# Patient Record
Sex: FEMALE | Race: WHITE | Employment: OTHER | URBAN - METROPOLITAN AREA
[De-identification: names, ages, dates, MRNs, and addresses within clinical notes are randomized per-mention and may not be internally consistent; named-entity substitution may affect disease eponyms.]

---

## 2017-03-09 ENCOUNTER — OFFICE VISIT (OUTPATIENT)
Dept: OBGYN CLINIC | Age: 62
End: 2017-03-09

## 2017-03-09 VITALS
WEIGHT: 170 LBS | HEIGHT: 63 IN | SYSTOLIC BLOOD PRESSURE: 116 MMHG | DIASTOLIC BLOOD PRESSURE: 70 MMHG | BODY MASS INDEX: 30.12 KG/M2

## 2017-03-09 DIAGNOSIS — R92.8 ABNORMAL MAMMOGRAM OF LEFT BREAST: ICD-10-CM

## 2017-03-09 DIAGNOSIS — Z01.419 ENCOUNTER FOR GYNECOLOGICAL EXAMINATION (GENERAL) (ROUTINE) WITHOUT ABNORMAL FINDINGS: Primary | ICD-10-CM

## 2017-03-09 DIAGNOSIS — Z11.51 SCREENING FOR HPV (HUMAN PAPILLOMAVIRUS): ICD-10-CM

## 2017-03-09 NOTE — PROGRESS NOTES
[facility]  [email or web address]  [phone]    Earlene Espinoza MD, [address] [facility]       Annual Gynecologic Exam:   WWE 60+  Chief Complaint   Patient presents with    Well Woman         Deepti Ngo is a 64 y.o. No obstetric history on file. WHITE OR   female who presents for an annual exam.    She does not report additional concerns today. Sexual history and Contraception:  History   Sexual Activity    Sexual activity: Yes    Partners: Male     She does not reports new sexual partner(s) in the last year. Preventive Medicine History:  Her last annual GYN exam was about two years ago. Her most recent Pap smear result: normal was obtained in October 2013. Her most recent HR HPV screen was Negative obtained 4 year(s) ago. Occ leaks urine when jumps on trampoline  She does not have a history of GALILEA 2, 3 or cervical cancer. Breast health:  Last mammogram: approximate date 10/7/15 and was normal.   A mammogram was scheduled for today. Breast cancer family updated: see FH. Bone health: Samantha Etienne She has not had a bone density scan in the past.   Last bone density test results: patient has never had a bone density scan. She does not have a history of osteopenia/osteoporosis. Osteoporosis family history updated: see FH.      Past Medical History:   Diagnosis Date    GERD (gastroesophageal reflux disease)     Headache(784.0)     migraines    History of mammogram 10/7/15    Negative, BIRADS 2    Hx of biopsy 11/18/13    vulvar bx: benign    Hypercholesterolemia     Hypertension     Other ill-defined conditions(799.89)     hx vagal responces     Tubulovillous adenoma 2007    Unspecified sleep apnea     NO CPAP     Past Surgical History:   Procedure Laterality Date    ENDOSCOPY, COLON, DIAGNOSTIC  8/2010    HX BILATERAL SALPINGO-OOPHORECTOMY  1/8/14    benign    HX CATARACT REMOVAL      bilateral    HX LAP CHOLECYSTECTOMY      CAT Pace.     HX OTHER SURGICAL      Cataract removal - 2001    HX OTHER SURGICAL  2011    EPIDURAL STEROID INJ LOW BACK    HX SALPINGO-OOPHORECTOMY      prophylactic due to family hx ovarian ca     Family History   Problem Relation Age of Onset    Ovarian Cancer Mother     Cancer Mother      ovarian    Dementia Father     Other Father      Insomnia    Diabetes Maternal Grandmother     Cancer Paternal Grandmother      colon    Parkinson's Disease Paternal Grandfather     Heart Disease Neg Hx     Hypertension Neg Hx      Social History     Social History    Marital status:      Spouse name: N/A    Number of children: N/A    Years of education: N/A     Occupational History    Not on file. Social History Main Topics    Smoking status: Never Smoker    Smokeless tobacco: Never Used    Alcohol use 0.0 oz/week      Comment: 2 DRINKS PER MONTH    Drug use: No    Sexual activity: Yes     Partners: Male     Other Topics Concern    Not on file     Social History Narrative       Allergies   Allergen Reactions    Apple Angioedema     Raw apples cause lip/eye to swelling  Peaches causes lip/eye swelling        Current Outpatient Prescriptions   Medication Sig    irbesartan (AVAPRO) 150 mg tablet TAKE ONE TABLET BY MOUTH DAILY ( GENERIC FOR AVAPRO)    FLUoxetine (PROZAC) 10 mg tablet TAKE ONE TABLET BY MOUTH EVERY DAY    diclofenac EC (VOLTAREN) 75 mg EC tablet Take  by mouth as needed.  raNITIdine (ZANTAC) 150 mg tablet Take 1 Tab by mouth two (2) times a day.  DIPHENHYDRAMINE HCL (BENADRYL PO) Take  by mouth as needed. No current facility-administered medications for this visit. Patient Active Problem List   Diagnosis Code    Chest pain, unspecified R07.9    Esophageal reflux K21.9    Overweight (BMI 25.0-29. 9) E66.3    Other and unspecified hyperlipidemia E78.5    HTN (hypertension) I10    JOLLY (obstructive sleep apnea) G47.33    Anxiety F41.9    FH: ovarian cancer Z80.41    UMA (stress urinary incontinence, female) N39.3    Gastroesophageal reflux disease without esophagitis K21.9       Review of Systems - History obtained from the patient  Constitutional: negative for weight loss, fever, night sweats  HEENT: negative for hearing loss, earache, congestion, snoring, sorethroat  CV: negative for chest pain, palpitations, edema  Resp: negative for cough, shortness of breath, wheezing  GI: negative for change in bowel habits, abdominal pain, black or bloody stools  : negative for frequency, dysuria, hematuria, vaginal discharge  MSK: negative for back pain, joint pain, muscle pain  Breast: negative for breast lumps, nipple discharge, galactorrhea  Skin :negative for itching, rash, hives  Neuro: negative for dizziness, headache, confusion, weakness  Psych: negative for anxiety, depression, change in mood  Heme/lymph: negative for bleeding, bruising, pallor    Physical Exam  Visit Vitals    /70    Ht 5' 3\" (1.6 m)    Wt 170 lb (77.1 kg)    BMI 30.11 kg/m2       Constitutional  · Appearance: well-nourished, well developed, alert, in no acute distress    HENT  · Head and Face: appears normal    Neck  · Inspection/Palpation: normal appearance, no masses or tenderness  · Lymph Nodes: no lymphadenopathy present  · Thyroid: gland size normal, nontender, no nodules or masses present on palpation    Chest  · Respiratory Effort: breathing labored  · Auscultation: normal breath sounds    Cardiovascular  · Heart:  · Auscultation: regular rate and rhythm without murmur    Breasts  · Inspection of Breasts: breasts symmetrical, no skin changes, no discharge present, nipple appearance normal, no skin retraction present  · Palpation of Breasts and Axillae: no masses present on palpation, no breast tenderness  · Axillary Lymph Nodes: no lymphadenopathy present    Gastrointestinal  · Abdominal Examination: abdomen non-tender to palpation, normal bowel sounds, no masses present  · Liver and spleen: no hepatomegaly present, spleen not palpable  · Hernias: no hernias identified    Genitourinary  · External Genitalia: normal appearance for age, no discharge present, no tenderness present, no inflammatory lesions present, no masses present, no atrophy present  · Vagina: normal vaginal vault with mild anterior laxity, no discharge present, no inflammatory lesions present, no masses present  · Bladder: non-tender to palpation  · Urethra: appears normal  · Cervix: normal   · Uterus: normal size, shape and consistency  · Adnexa: no adnexal tenderness present, no adnexal masses present  · Perineum: perineum within normal limits, no evidence of trauma, no rashes or skin lesions present  · Anus: anus within normal limits, no hemorrhoids present  · Inguinal Lymph Nodes: no lymphadenopathy present    Skin  · General Inspection: no rash, no lesions identified    Neurologic/Psychiatric  · Mental Status:  · Orientation: grossly oriented to person, place and time  · Mood and Affect: mood normal, affect appropriate    Assessment:  64 y.o. No obstetric history on file. for well woman exam  Encounter Diagnoses   Name Primary?     Abnormal mammogram of left breast     Encounter for gynecological examination (general) (routine) without abnormal findings Yes    Screening for HPV (human papillomavirus)        Plan:  The patient was counseled about diet, exercise, healthy lifestyle  We discussed current self breast exam and mammogram recommendations  We discussed current pap smear and HR HPV testing guidelines  We recommend follow up in one year for routine annual gynecologic exam or sooner if needed  We recommend follow up with a primary care physician for any chronic medical problems or non-gynecologic concerns    We discussed calcium/vitamin D/weight bearing exercise and osteoporosis prevention  Handouts were given to the patient   pelvic floor exercises h/o given, contact MD if desires PT referral, avoid strain    Breast imaging referral. Reviewed prior mmg/us: simple cyst.  Folllow up:  [x] return for annual well woman exam in one year or sooner if she is having problems  [] follow up and ultrasound  [] mammogram  [] 6 months  [] 6 weeks   []     Orders Placed This Encounter    [facility] MAMMO LT DX INCL CAD    US BREAST LT COMPLETE 4 QUAD    PAP IG, HPV AND RFX HPV 01/87,99([id number])       Results for orders placed or performed in visit on 03/09/17   [facility] MAMMO BI SCREENING INCL CAD    Narrative    STUDY: Bilateral digital screening mammogram    INDICATION:  Screening. COMPARISON:  2013, 2015    BREAST COMPOSITION:  The breasts are heterogeneously dense, which may obscure  small masses. FINDINGS: Bilateral digital screening mammography was performed and is  interpreted in conjunction with a computer assisted detection (CAD) system. No  suspicious  calcifications are identified. There is interval development of a  macrolobulated 1.1 cm structure located 4.5 cm from the nipple at the 9:00  position of the left breast. The nipple appears to be newly inverted. Impression    IMPRESSION:  BI-RADS 0: Incomplete. Needs additional imaging evaluation. RECOMMENDATIONS:  Spot compression views and ultrasound are recommended for evaluation of the left  breast mass and possible nipple inversion    The patient will be notified of these results.

## 2017-03-09 NOTE — MR AVS SNAPSHOT
Visit Information Date & Time Provider Department Dept. Phone Encounter #  
 3/9/2017  9:40 AM Steven Rodarte MD [facility] [phone] [id number] Upcoming Health Maintenance Date Due Hepatitis C Screening 1955 COLONOSCOPY 12/1/1973 ZOSTER VACCINE AGE 60> 12/1/2015 BREAST CANCER SCRN MAMMOGRAM 10/7/2016 PAP AKA CERVICAL CYTOLOGY 1/1/2017 Allergies as of 3/9/2017  Review Complete On: 3/9/2017 By: Jeff Harry LPN Severity Noted Reaction Type Reactions Apple High 01/08/2014   Side Effect Angioedema Raw apples cause lip/eye to swelling Peaches causes lip/eye swelling Current Immunizations  Reviewed on 11/18/2016 Name Date Influenza Vaccine 9/18/2016, 10/1/2014, 10/1/2013 TB Skin Test (PPD) 5/5/2014 TDAP Vaccine 9/6/2011 Not reviewed this visit Vitals BP Height(growth percentile) Weight(growth percentile) BMI OB Status Smoking Status 116/70 5' 3\" (1.6 m) 170 lb (77.1 kg) 30.11 kg/m2 Postmenopausal Never Smoker BMI and BSA Data Body Mass Index Body Surface Area  
 30.11 kg/m 2 1.85 m 2 Preferred Pharmacy Pharmacy Name Phone [address] [facility], [address] [facility] [place] [phone] Your Updated Medication List  
  
   
This list is accurate as of: 3/9/17  9:53 AM.  Always use your most recent med list.  
  
  
  
  
 BENADRYL PO Take  by mouth as needed. diclofenac EC 75 mg EC tablet Commonly known as:  VOLTAREN Take  by mouth as needed. FLUoxetine 10 mg tablet Commonly known as:  PROzac TAKE ONE TABLET BY MOUTH EVERY DAY  
  
 irbesartan 150 mg tablet Commonly known as:  AVAPRO TAKE ONE TABLET BY MOUTH DAILY ( [address] [facility]) raNITIdine 150 mg tablet Commonly known as:  ZANTAC Take 1 Tab by mouth two (2) times a day. Patient Instructions Well Visit, Women 48 to 72: Care Instructions Your Care Instructions Physical exams can help you stay healthy. Your doctor has checked your overall health and may have suggested ways to take good care of yourself. He or she also may have recommended tests. At home, you can help prevent illness with healthy eating, regular exercise, and other steps. Follow-up care is a key part of your treatment and safety. Be sure to make and go to all appointments, and call your doctor if you are having problems. It's also a good idea to know your test results and keep a list of the medicines you take. How can you care for yourself at home? · Reach and stay at a healthy weight. This will lower your risk for many problems, such as obesity, diabetes, heart disease, and high blood pressure. · Get at least 30 minutes of exercise on most days of the week. Walking is a good choice. You also may want to do other activities, such as running, swimming, cycling, or playing tennis or team sports. · Do not smoke. Smoking can make health problems worse. If you need help quitting, talk to your doctor about stop-smoking programs and medicines. These can increase your chances of quitting for good. · Protect your skin from too much sun. When you're outdoors from 10 a.m. to 4 p.m., stay in the shade or cover up with clothing and a hat with a wide brim. Wear sunglasses that block UV rays. Even when it's cloudy, put broad-spectrum sunscreen (SPF 30 or higher) on any exposed skin. · See a dentist one or two times a year for checkups and to have your teeth cleaned. · Wear a seat belt in the car. · Limit alcohol to 1 drink a day. Too much alcohol can cause health problems. Follow your doctor's advice about when to have certain tests. These tests can spot problems early. · Cholesterol. Your doctor will tell you how often to have this done based on your age, family history, or other things that can increase your risk for heart attack and stroke. · Blood pressure. Have your blood pressure checked during a routine doctor visit. Your doctor will tell you how often to check your blood pressure based on your age, your blood pressure results, and other factors. · Mammogram. Ask your doctor how often you should have a mammogram, which is an X-ray of your breasts. A mammogram can spot breast cancer before it can be felt and when it is easiest to treat. · Pap test and pelvic exam. Ask your doctor how often you should have a Pap test. You may not need to have a Pap test as often as you used to. · Vision. Have your eyes checked every year or two or as often as your doctor suggests. Some experts recommend that you have yearly exams for glaucoma and other age-related eye problems starting at age 48. · Hearing. Tell your doctor if you notice any change in your hearing. You can have tests to find out how well you hear. · Diabetes. Ask your doctor whether you should have tests for diabetes. · Colon cancer. You should begin tests for colon cancer at age 48. You may have one of several tests. Your doctor will tell you how often to have tests based on your age and risk. Risks include whether you already had a precancerous polyp removed from your colon or whether your parents, sisters and brothers, or children have had colon cancer. · Thyroid disease. Talk to your doctor about whether to have your thyroid checked as part of a regular physical exam. Women have an increased chance of a thyroid problem. · Osteoporosis. You should begin tests for bone density at age 72. If you are younger than 72, ask your doctor whether you have factors that may increase your risk for this disease. You may want to have this test before age 72. · Heart attack and stroke risk. At least every 4 to 6 years, you should have your risk for heart attack and stroke assessed.  Your doctor uses factors such as your age, blood pressure, cholesterol, and whether you smoke or have diabetes to show what your risk for a heart attack or stroke is over the next 10 years. When should you call for help? Watch closely for changes in your health, and be sure to contact your doctor if you have any problems or symptoms that concern you. Where can you learn more? Go to http://victor hugo-maureen.info/. Enter G749 in the search box to learn more about \"Well Visit, Women 50 to 72: Care Instructions. \" Current as of: July 19, 2016 Content Version: 11.1 © 8177-8533 Outlisten. Care instructions adapted under license by [employer] (which disclaims liability or warranty for this information). If you have questions about a medical condition or this instruction, always ask your healthcare professional. [address] any warranty or liability for your use of this information. Introducing [facility]! Dear Flor Castellanos: Thank you for requesting a [employer] account. Our records indicate that you already have an active [employer] account. You can access your account anytime at [email or web address] Did you know that you can access your hospital and ER discharge instructions at any time in [employer]? You can also review all of your test results from your hospital stay or ER visit. Additional Information If you have questions, please visit the Frequently Asked Questions section of the [employer] website at [email or web address] Remember, [employer] is NOT to be used for urgent needs. For medical emergencies, dial 911. Now available from your iPhone and Android! Please provide this summary of care documentation to your next provider. Your primary care clinician is listed as Elle Yadav. If you have any questions after today's visit, please call [phone].

## 2017-03-09 NOTE — PATIENT INSTRUCTIONS
Well Visit, Women 48 to 72: Care Instructions  Your Care Instructions  Physical exams can help you stay healthy. Your doctor has checked your overall health and may have suggested ways to take good care of yourself. He or she also may have recommended tests. At home, you can help prevent illness with healthy eating, regular exercise, and other steps. Follow-up care is a key part of your treatment and safety. Be sure to make and go to all appointments, and call your doctor if you are having problems. It's also a good idea to know your test results and keep a list of the medicines you take. How can you care for yourself at home? · Reach and stay at a healthy weight. This will lower your risk for many problems, such as obesity, diabetes, heart disease, and high blood pressure. · Get at least 30 minutes of exercise on most days of the week. Walking is a good choice. You also may want to do other activities, such as running, swimming, cycling, or playing tennis or team sports. · Do not smoke. Smoking can make health problems worse. If you need help quitting, talk to your doctor about stop-smoking programs and medicines. These can increase your chances of quitting for good. · Protect your skin from too much sun. When you're outdoors from 10 a.m. to 4 p.m., stay in the shade or cover up with clothing and a hat with a wide brim. Wear sunglasses that block UV rays. Even when it's cloudy, put broad-spectrum sunscreen (SPF 30 or higher) on any exposed skin. · See a dentist one or two times a year for checkups and to have your teeth cleaned. · Wear a seat belt in the car. · Limit alcohol to 1 drink a day. Too much alcohol can cause health problems. Follow your doctor's advice about when to have certain tests. These tests can spot problems early. · Cholesterol.  Your doctor will tell you how often to have this done based on your age, family history, or other things that can increase your risk for heart attack and stroke. · Blood pressure. Have your blood pressure checked during a routine doctor visit. Your doctor will tell you how often to check your blood pressure based on your age, your blood pressure results, and other factors. · Mammogram. Ask your doctor how often you should have a mammogram, which is an X-ray of your breasts. A mammogram can spot breast cancer before it can be felt and when it is easiest to treat. · Pap test and pelvic exam. Ask your doctor how often you should have a Pap test. You may not need to have a Pap test as often as you used to. · Vision. Have your eyes checked every year or two or as often as your doctor suggests. Some experts recommend that you have yearly exams for glaucoma and other age-related eye problems starting at age 48. · Hearing. Tell your doctor if you notice any change in your hearing. You can have tests to find out how well you hear. · Diabetes. Ask your doctor whether you should have tests for diabetes. · Colon cancer. You should begin tests for colon cancer at age 48. You may have one of several tests. Your doctor will tell you how often to have tests based on your age and risk. Risks include whether you already had a precancerous polyp removed from your colon or whether your parents, sisters and brothers, or children have had colon cancer. · Thyroid disease. Talk to your doctor about whether to have your thyroid checked as part of a regular physical exam. Women have an increased chance of a thyroid problem. · Osteoporosis. You should begin tests for bone density at age 72. If you are younger than 72, ask your doctor whether you have factors that may increase your risk for this disease. You may want to have this test before age 72. · Heart attack and stroke risk. At least every 4 to 6 years, you should have your risk for heart attack and stroke assessed.  Your doctor uses factors such as your age, blood pressure, cholesterol, and whether you smoke or have diabetes to show what your risk for a heart attack or stroke is over the next 10 years. When should you call for help? Watch closely for changes in your health, and be sure to contact your doctor if you have any problems or symptoms that concern you. Where can you learn more? Go to http://victor hugo-maureen.info/. Enter Z408 in the search box to learn more about \"Well Visit, Women 50 to 72: Care Instructions. \"  Current as of: July 19, 2016  Content Version: 11.1  © 7368-0132 [employer]. Care instructions adapted under license by [employer] (which disclaims liability or warranty for this information). If you have questions about a medical condition or this instruction, always ask your healthcare professional. [address] any warranty or liability for your use of this information.

## 2017-03-12 LAB
CYTOLOGIST CVX/VAG CYTO: NORMAL
CYTOLOGY CVX/VAG DOC THIN PREP: NORMAL
CYTOLOGY HISTORY:: NORMAL
DX ICD CODE: NORMAL
HPV I/H RISK 1 DNA CVX QL PROBE+SIG AMP: NEGATIVE
Lab: NORMAL
OTHER STN SPEC: NORMAL
PATH REPORT.FINAL DX SPEC: NORMAL
STAT OF ADQ CVX/VAG CYTO-IMP: NORMAL

## 2017-03-15 ENCOUNTER — TELEPHONE (OUTPATIENT)
Dept: OBGYN CLINIC | Age: 62
End: 2017-03-15

## 2017-03-15 NOTE — TELEPHONE ENCOUNTER
Patient called requesting the number for central scheduling to have her MGM done for additional testing. This nurse provided the patient with the correct number. She verbalized understanding.

## 2017-03-22 ENCOUNTER — HOSPITAL ENCOUNTER (OUTPATIENT)
Dept: MAMMOGRAPHY | Age: 62
Discharge: HOME | End: 2017-03-22
Attending: OBSTETRICS & GYNECOLOGY
Payer: COMMERCIAL

## 2017-03-22 DIAGNOSIS — R92.8 ABNORMAL MAMMOGRAM OF LEFT BREAST: ICD-10-CM

## 2017-03-22 PROCEDURE — 77065 DX MAMMO INCL CAD UNI: CPT

## 2017-03-22 PROCEDURE — 76642 ULTRASOUND BREAST LIMITED: CPT

## 2017-07-19 DIAGNOSIS — F41.8 SITUATIONAL ANXIETY: ICD-10-CM

## 2017-07-19 DIAGNOSIS — I15.9 SECONDARY HYPERTENSION: Primary | ICD-10-CM

## 2017-07-19 RX ORDER — FLUOXETINE 10 MG/1
TABLET ORAL
Qty: 30 TAB | Refills: 0 | Status: SHIPPED | OUTPATIENT
Start: 2017-07-19 | End: 2017-08-03 | Stop reason: SDUPTHER

## 2017-07-19 RX ORDER — IRBESARTAN 150 MG/1
150 TABLET ORAL DAILY
Qty: 30 TAB | Refills: 0 | Status: SHIPPED | OUTPATIENT
Start: 2017-07-19 | End: 2017-08-03 | Stop reason: SDUPTHER

## 2017-07-19 NOTE — TELEPHONE ENCOUNTER
[facility] Fax Request    Rx Irbesartan 150 mg Qty (30) no refills  RX Fluoxetine 10mg Qty (30) no refills    Last Refill - 12/28/16  Last Visit - 11/18/16  Last Labs - 11/18/16

## 2017-08-03 ENCOUNTER — OFFICE VISIT (OUTPATIENT)
Dept: INTERNAL MEDICINE CLINIC | Age: 62
End: 2017-08-03

## 2017-08-03 VITALS
SYSTOLIC BLOOD PRESSURE: 120 MMHG | TEMPERATURE: 98.8 F | BODY MASS INDEX: 29.5 KG/M2 | DIASTOLIC BLOOD PRESSURE: 76 MMHG | OXYGEN SATURATION: 97 % | RESPIRATION RATE: 18 BRPM | HEART RATE: 70 BPM | WEIGHT: 166.5 LBS | HEIGHT: 63 IN

## 2017-08-03 DIAGNOSIS — I10 ESSENTIAL HYPERTENSION: Primary | ICD-10-CM

## 2017-08-03 DIAGNOSIS — D36.9 ADENOMATOUS POLYP: ICD-10-CM

## 2017-08-03 DIAGNOSIS — F41.9 ANXIETY: ICD-10-CM

## 2017-08-03 RX ORDER — IRBESARTAN 150 MG/1
150 TABLET ORAL DAILY
Qty: 30 TAB | Refills: 5 | Status: SHIPPED | OUTPATIENT
Start: 2017-08-03 | End: 2018-03-15 | Stop reason: SDUPTHER

## 2017-08-03 RX ORDER — FLUOXETINE 10 MG/1
10 TABLET ORAL DAILY
Qty: 30 TAB | Refills: 5 | Status: SHIPPED | OUTPATIENT
Start: 2017-08-03 | End: 2017-11-01 | Stop reason: SDUPTHER

## 2017-08-03 NOTE — PROGRESS NOTES
HISTORY OF PRESENT ILLNESS  Parviz Raya is a 64 y.o. female. HPI  Hypertension:  Parviz Raya is a 64 y.o. female with hypertension. without Chronic kidney disease stage    Medication change since last visit: No  The patient reports:  taking medications as instructed, no medication side effects noted, home BP monitoring in range of 818-989'K systolic over 57-63'L diastolic, no chest pain on exertion, no dyspnea on exertion, no swelling of ankles, no orthostatic dizziness or lightheadedness, no palpitations. Lifestyle modification/social history: generally follows a low fat low cholesterol diet, generally follows a low sodium diet, sedentary, nonsmoker    Lab Results   Component Value Date/Time    Sodium 142 03/09/2017 10:33 AM    Potassium 4.5 03/09/2017 10:33 AM    Chloride 103 03/09/2017 10:33 AM    CO2 25 03/09/2017 10:33 AM    Anion gap 8 12/31/2013 10:10 AM    Glucose 93 03/09/2017 10:33 AM    BUN 11 03/09/2017 10:33 AM    Creatinine 0.68 03/09/2017 10:33 AM    BUN/Creatinine ratio 16 03/09/2017 10:33 AM    GFR est  03/09/2017 10:33 AM    GFR est non-AA 95 03/09/2017 10:33 AM    Calcium 9.3 03/09/2017 10:33 AM         Anxiety. Anxiety :  Patient is seen for anxiety disorder. Current treatment includes Prozac and no other therapies. Reported side effects from the medication: none. Ongoing symptoms include: some increased tension, anxiety related to family stressors. Daughter nearing delivery, high risk pregnancy and lost child soon after delivery last year. Pt is worried about her. Work some stressful but retiring in several months. she does feel treatment is effective. Patient denies: palpitations, chest pain, shortness of breath, dizziness. Depression is not associated with her anxiety. She is not interested in escalating prozac however. ROS    Physical Exam   Constitutional: She appears well-developed and well-nourished. No distress.    /60 (BP 1 Location: Left arm, BP Patient Position: Sitting)  Pulse 70  Temp 98.8 °F (37.1 °C) (Oral)   Resp 18  Ht 5' 3\" (1.6 m)  Wt 166 lb 8 oz (75.5 kg)  LMP  (Approximate)  SpO2 97%  BMI 29.49 kg/m2Body mass index is 29.49 kg/(m^2). HENT:   Mouth/Throat: Oropharynx is clear and moist.   Neck: No JVD present. Carotid bruit is not present. Cardiovascular: Normal rate, regular rhythm, normal heart sounds and intact distal pulses. Pulmonary/Chest: Effort normal and breath sounds normal.   Musculoskeletal: She exhibits no edema. Neurological: She is alert. Skin: Skin is warm and dry. She is not diaphoretic. Psychiatric: She has a normal mood and affect. Her speech is normal and behavior is normal. Judgment and thought content normal. Cognition and memory are normal.   Nursing note and vitals reviewed. Repeat vitals were done by me and were:  Visit Vitals    /76    Pulse 70    Temp 98.8 °F (37.1 °C) (Oral)    Resp 18    Ht 5' 3\" (1.6 m)    Wt 166 lb 8 oz (75.5 kg)    LMP  (Approximate)    SpO2 97%    BMI 29.49 kg/m2         ASSESSMENT and PLAN  Diagnoses and all orders for this visit:    1. Essential hypertension - Well controlled and stable. her medications were reviewed and refilled where necessary as noted below. Labs ordered as noted. -     irbesartan (AVAPRO) 150 mg tablet; Take 1 Tab by mouth daily. 2. Anxiety - mild exacerbation with family/ work stressors. She delines offer to increase prozac for now. -     FLUoxetine (PROZAC) 10 mg tablet; Take 1 Tab by mouth daily. 3. Adenomatous polyp - due for 5 year follow up.   ]she was advised to have follow up colonoscopy in 2017      -     REFERRAL FOR COLONOSCOPY      Follow-up Disposition:  Return in about 6 months (around 2/3/2018).

## 2017-08-03 NOTE — MR AVS SNAPSHOT
Visit Information Date & Time Provider Department Dept. Phone Encounter #  
 8/3/2017 10:00 AM Mendel Councilman, MD [facility] [address] [facility] [id number] Follow-up Instructions Return in about 6 months (around 2/3/2018). Your Appointments 3/12/2018  9:40 AM  
MAMMOGRAPHY with MAMMOGRAM, YOLANDA Coe ([address]) Appt Note: mammo and ae   TP  
 [address] [place] [address]  
[phone]  
  
   
 [address]  
  
    
 3/12/2018 10:00 AM  
ESTABLISHED PATIENT with Desire Jones MD  
[place] ([address]) Appt Note: mammo and ae   TP  
 [address] [facility] [address]  
[address] Upcoming Health Maintenance Date Due Hepatitis C Screening 1955 COLONOSCOPY 12/1/1973 ZOSTER VACCINE AGE 60> 10/1/2015 INFLUENZA AGE 9 TO ADULT 8/1/2017 BREAST CANCER SCRN MAMMOGRAM 3/22/2018 PAP AKA CERVICAL CYTOLOGY 3/9/2020 DTaP/Tdap/Td series (2 - Td) 9/6/2021 Allergies as of 8/3/2017  Review Complete On: 8/3/2017 By: Narayan Izaguirre LPN Severity Noted Reaction Type Reactions Apple High 01/08/2014   Side Effect Angioedema Raw apples cause lip/eye to swelling Peaches causes lip/eye swelling Current Immunizations  Reviewed on 11/18/2016 Name Date Influenza Vaccine 9/18/2016, 10/1/2014, 10/1/2013 TB Skin Test (PPD) 5/5/2014 TDAP Vaccine 9/6/2011 Not reviewed this visit You Were Diagnosed With   
  
 Codes Comments Essential hypertension    -  Primary ICD-10-CM: I10 
ICD-9-CM: 401.9 Anxiety     ICD-10-CM: F41.9 ICD-9-CM: 300.00 Situational anxiety     ICD-10-CM: F41.8 ICD-9-CM: 300.09 Secondary hypertension     ICD-10-CM: I15.9 ICD-9-CM: 405.99   
 Adenomatous polyp     ICD-10-CM: D36.9 ICD-9-CM: 229.9 Vitals BP Pulse Temp Resp Height(growth percentile) Weight(growth percentile) 101/60 (BP 1 Location: Left arm, BP Patient Position: Sitting) 70 98.8 °F (37.1 °C) (Oral) 18 5' 3\" (1.6 m) 166 lb 8 oz (75.5 kg) LMP SpO2 BMI OB Status Smoking Status (Approximate) 97% 29.49 kg/m2 Postmenopausal Never Smoker BMI and BSA Data Body Mass Index Body Surface Area  
 29.49 kg/m 2 1.83 m 2 Preferred Pharmacy [facility] Name Phone [id number] [address] [facility] [phone] Your Updated Medication List  
  
   
This list is accurate as of: 8/3/17 10:24 AM.  Always use your most recent med list.  
  
  
  
  
 BENADRYL PO Take  by mouth as needed. diclofenac EC 75 mg EC tablet Commonly known as:  VOLTAREN Take  by mouth as needed. FLUoxetine 10 mg tablet Commonly known as:  PROzac Take 1 Tab by mouth daily. irbesartan 150 mg tablet Commonly known as:  AVAPRO Take 1 Tab by mouth daily. Prescriptions Sent to Pharmacy Refills FLUoxetine (PROZAC) 10 mg tablet 5 Sig: Take 1 Tab by mouth daily. Class: Normal  
 Pharmacy: [address], [address] [place] [address] Ph #: [phone] Route: Oral  
 irbesartan (AVAPRO) 150 mg tablet 5 Sig: Take 1 Tab by mouth daily. Class: Normal  
 Pharmacy: [address], [address] [place] [address] Ph #: [phone] Route: Oral  
  
We Performed the Following REFERRAL FOR COLONOSCOPY [[id number] Custom] Follow-up Instructions Return in about 6 months (around 2/3/2018). Referral Information Referral ID Referred By Referred To  
  
 [id number] BEBE, [address]  [place], [address] [facility] Visits Status Start Date End Date 1 New Request 8/3/17 8/3/18 If your referral has a status of pending review or denied, additional information will be sent to support the outcome of this decision. Introducing hospitals & HEALTH SERVICES! Dear Sepideh Lee: Thank you for requesting a [employer] account. Our records indicate that you already have an active [employer] account. You can access your account anytime at [email or web address] Did you know that you can access your hospital and ER discharge instructions at any time in [employer]? You can also review all of your test results from your hospital stay or ER visit. Additional Information If you have questions, please visit the Frequently Asked Questions section of the [employer] website at [email or web address] Remember, [employer] is NOT to be used for urgent needs. For medical emergencies, dial 911. Now available from your iPhone and Android! Please provide this summary of care documentation to your next provider. Your primary care clinician is listed as [facility]. If you have any questions after today's visit, please call [phone].

## 2017-11-01 DIAGNOSIS — F41.9 ANXIETY: ICD-10-CM

## 2017-11-01 RX ORDER — FLUOXETINE 10 MG/1
10 TABLET ORAL DAILY
Qty: 90 TAB | Refills: 2 | Status: SHIPPED | OUTPATIENT
Start: 2017-11-01 | End: 2018-10-11 | Stop reason: SDUPTHER

## 2017-11-03 DIAGNOSIS — F41.9 ANXIETY: ICD-10-CM

## 2017-11-03 RX ORDER — FLUOXETINE 10 MG/1
10 TABLET ORAL DAILY
Qty: 90 TAB | Refills: 2 | Status: CANCELLED | OUTPATIENT
Start: 2017-11-03

## 2017-11-03 NOTE — TELEPHONE ENCOUNTER
Per the Pharmacist wants to know if patient can have a  90 days supply on her  FLUoxetine (PROZAC) 10 mg tablet  Please call [facility] at [phone]

## 2018-01-25 ENCOUNTER — OFFICE VISIT (OUTPATIENT)
Dept: INTERNAL MEDICINE CLINIC | Age: 63
End: 2018-01-25

## 2018-01-25 VITALS
BODY MASS INDEX: 39.76 KG/M2 | SYSTOLIC BLOOD PRESSURE: 113 MMHG | OXYGEN SATURATION: 100 % | RESPIRATION RATE: 18 BRPM | HEART RATE: 95 BPM | HEIGHT: 63 IN | TEMPERATURE: 100.1 F | WEIGHT: 224.38 LBS | DIASTOLIC BLOOD PRESSURE: 77 MMHG

## 2018-01-25 DIAGNOSIS — R50.9 ELEVATED TEMPERATURE: ICD-10-CM

## 2018-01-25 DIAGNOSIS — J11.1 INFLUENZA: Primary | ICD-10-CM

## 2018-01-25 LAB
QUICKVUE INFLUENZA TEST: POSITIVE
VALID INTERNAL CONTROL?: YES

## 2018-01-25 RX ORDER — IBUPROFEN 400 MG/1
TABLET ORAL
COMMUNITY
End: 2022-02-09

## 2018-01-25 RX ORDER — HYDROCODONE POLISTIREX AND CHLORPHENIRAMINE POLISTIREX 10; 8 MG/5ML; MG/5ML
1 SUSPENSION, EXTENDED RELEASE ORAL
Qty: 120 ML | Refills: 0 | Status: SHIPPED | OUTPATIENT
Start: 2018-01-25 | End: 2018-03-12

## 2018-01-25 NOTE — MR AVS SNAPSHOT
[address] [facility] [address] 
 
 
 [address] Fidelina Heady [address] [facility] 
[phone] Patient: Sunny Riddle MRN: [id number] JQC:[id number] Visit Information Date & Time Provider Department Dept. Phone Encounter #  
 1/25/2018  9:15 AM Lisbeth Lewis MD [facility] [address] [id number] Follow-up Instructions Return if symptoms worsen or fail to improve. Your Appointments 3/12/2018  9:40 AM  
MAMMOGRAPHY with MAMMOGRAM, YOLANDA Coe ([address]) Appt Note: mammo and ae   TP  
 [address] [place] [address]  
[phone]  
  
   
 [address] [place] [address]  
  
    
 3/12/2018 10:00 AM  
ESTABLISHED PATIENT with Chas Schwab MD  
[place] ([address] [facility]) Appt Note: mammo and ae   TP  
 [address] [facility] [phone]  
[facility] [address] Upcoming Health Maintenance Date Due Hepatitis C Screening 1955 COLONOSCOPY 12/1/1973 Influenza Age 5 to Adult 8/1/2017 BREAST CANCER SCRN MAMMOGRAM 3/22/2018 PAP AKA CERVICAL CYTOLOGY 3/9/2020 DTaP/Tdap/Td series (2 - Td) 9/6/2021 Allergies as of 1/25/2018  Review Complete On: 8/3/2017 By: Sheilda Favre, LPN Severity Noted Reaction Type Reactions Apple High 01/08/2014   Side Effect Angioedema Raw apples cause lip/eye to swelling Peaches causes lip/eye swelling Current Immunizations  Reviewed on 11/18/2016 Name Date Influenza Vaccine 9/18/2016, 10/1/2014, 10/1/2013 TB Skin Test (PPD) 5/5/2014 TDAP Vaccine 9/6/2011 Zoster Vaccine, Live 12/3/2017 12:00 AM  
  
 Not reviewed this visit You Were Diagnosed With   
  
 Codes Comments Influenza    -  Primary ICD-10-CM: J11.1 ICD-9-CM: 840.5 Elevated temperature     ICD-10-CM: R50.9 ICD-9-CM: 780.60 Vitals BP Pulse Temp Resp Height(growth percentile) Weight(growth percentile) 113/77 (BP 1 Location: Left arm, BP Patient Position: Sitting) 95 100.1 °F (37.8 °C) (Oral) 18 5' 3\" (1.6 m) 224 lb 6 oz (101.8 kg) SpO2 BMI OB Status Smoking Status 100% 39.75 kg/m2 Postmenopausal Never Smoker Vitals History BMI and BSA Data Body Mass Index Body Surface Area 39.75 kg/m 2 2.13 m 2 Preferred Pharmacy Pharmacy Name Phone [id number] - Radha Zarate - [address] [facility] [address] [phone] Your Updated Medication List  
  
   
This list is accurate as of: 1/25/18  9:59 AM.  Always use your most recent med list.  
  
  
  
  
 BENADRYL PO Take  by mouth as needed. chlorpheniramine-HYDROcodone 10-8 mg/5 mL suspension Commonly known as:  Emilee Chambers Take 5 mL by mouth every twelve (12) hours as needed for Cough. Max Daily Amount: 10 mL. FLUoxetine 10 mg tablet Commonly known as:  PROzac Take 1 Tab by mouth daily. ibuprofen 400 mg tablet Commonly known as:  MOTRIN Take  by mouth every six (6) hours as needed for Pain. irbesartan 150 mg tablet Commonly known as:  AVAPRO Take 1 Tab by mouth daily. Prescriptions Printed Refills  
 chlorpheniramine-HYDROcodone (TUSSIONEX) 10-8 mg/5 mL suspension 0 Sig: Take 5 mL by mouth every twelve (12) hours as needed for Cough. Max Daily Amount: 10 mL. Class: Print Route: Oral  
  
We Performed the Following AMB POC RAPID INFLUENZA TEST [38891 CPT(R)] Follow-up Instructions Return if symptoms worsen or fail to improve. Patient Instructions Influenza (Flu): Care Instructions Your Care Instructions Influenza (flu) is an infection in the lungs and breathing passages. It is caused by the influenza virus.  There are different strains, or types, of the flu virus from year to year. Unlike the common cold, the flu comes on suddenly and the symptoms, such as a cough, congestion, fever, chills, fatigue, aches, and pains, are more severe. These symptoms may last up to 10 days. Although the flu can make you feel very sick, it usually doesn't cause serious health problems. Home treatment is usually all you need for flu symptoms. But your doctor may prescribe antiviral medicine to prevent other health problems, such as pneumonia, from developing. Older people and those who have a long-term health condition, such as lung disease, are most at risk for having pneumonia or other health problems. Follow-up care is a key part of your treatment and safety. Be sure to make and go to all appointments, and call your doctor if you are having problems. It's also a good idea to know your test results and keep a list of the medicines you take. How can you care for yourself at home? · Get plenty of rest. 
· Drink plenty of fluids, enough so that your urine is light yellow or clear like water. If you have kidney, heart, or liver disease and have to limit fluids, talk with your doctor before you increase the amount of fluids you drink. · Take an over-the-counter pain medicine if needed, such as acetaminophen (Tylenol), ibuprofen (Advil, Motrin), or naproxen (Aleve), to relieve fever, headache, and muscle aches. Read and follow all instructions on the label. No one younger than 20 should take aspirin. It has been linked to Reye syndrome, a serious illness. · Do not smoke. Smoking can make the flu worse. If you need help quitting, talk to your doctor about stop-smoking programs and medicines. These can increase your chances of quitting for good. · Breathe moist air from a hot shower or from a sink filled with hot water to help clear a stuffy nose. · Before you use cough and cold medicines, check the label.  These medicines may not be safe for young children or for people with certain health problems. · If the skin around your nose and lips becomes sore, put some petroleum jelly on the area. · To ease coughing: ¨ Drink fluids to soothe a scratchy throat. ¨ Suck on cough drops or plain hard candy. ¨ Take an over-the-counter cough medicine that contains dextromethorphan to help you get some sleep. Read and follow all instructions on the label. ¨ Raise your head at night with an extra pillow. This may help you rest if coughing keeps you awake. · Take any prescribed medicine exactly as directed. Call your doctor if you think you are having a problem with your medicine. To avoid spreading the flu · Wash your hands regularly, and keep your hands away from your face. · Stay home from school, work, and other public places until you are feeling better and your fever has been gone for at least 24 hours. The fever needs to have gone away on its own without the help of medicine. · Ask people living with you to talk to their doctors about preventing the flu. They may get antiviral medicine to keep from getting the flu from you. · To prevent the flu in the future, get a flu vaccine every fall. Encourage people living with you to get the vaccine. · Cover your mouth when you cough or sneeze. When should you call for help? Call 911 anytime you think you may need emergency care. For example, call if: 
? · You have severe trouble breathing. ?Call your doctor now or seek immediate medical care if: 
? · You have new or worse trouble breathing. ? · You seem to be getting much sicker. ? · You feel very sleepy or confused. ? · You have a new or higher fever. ? · You get a new rash. ? Watch closely for changes in your health, and be sure to contact your doctor if: 
? · You begin to get better and then get worse. ? · You are not getting better after 1 week. Where can you learn more? Go to http://victor hugo-maureen.info/. Enter X264 in the search box to learn more about \"Influenza (Flu): Care Instructions. \" Current as of: May 12, 2017 Content Version: 11.4 © 6825-4481 [employer]. Care instructions adapted under license by [employer] (which disclaims liability or warranty for this information). If you have questions about a medical condition or this instruction, always ask your healthcare professional. [address] any warranty or liability for your use of this information. Introducing [facility] & HEALTH SERVICES! Dear Miguelito [place]: Thank you for requesting a [employer] account. Our records indicate that you already have an active [employer] account. You can access your account anytime at [email or web address] Did you know that you can access your hospital and ER discharge instructions at any time in [employer]? You can also review all of your test results from your hospital stay or ER visit. Additional Information If you have questions, please visit the Frequently Asked Questions section of the [employer] website at [email or web address] Remember, [employer] is NOT to be used for urgent needs. For medical emergencies, dial 911. Now available from your iPhone and Android! Please provide this summary of care documentation to your next provider. Your primary care clinician is listed as Clayton Aly. If you have any questions after today's visit, please call [phone].

## 2018-01-25 NOTE — PROGRESS NOTES
HISTORY OF PRESENT ILLNESS  Jyoti Montes is a 58 y.o. female. HPI  Upper respiratory illness:  Jyoti Montes presents with complaints of congestion, dry cough, headache, fever and chills, R chest wall side pain - now resolved. nausea/ vomiting for 3 days. no nausea and no vomiting now . she has not had  night sweats and myalgias. Symptoms are moderate. Over-the-counter remedies including iburprofen   has been used with good relief of symptoms. Drinking plenty of fluids: yes  Asthma?:  no  non-smoker  Contacts with similar infections: yes     Patient Active Problem List   Diagnosis Code    Chest pain, unspecified R07.9    Esophageal reflux K21.9    Overweight (BMI 25.0-29. 9) E66.3    Other and unspecified hyperlipidemia E78.5    HTN (hypertension) I10    JOLLY (obstructive sleep apnea) G47.33    Anxiety F41.9    FH: ovarian cancer Z80.41    UMA (stress urinary incontinence, female) N39.3    Gastroesophageal reflux disease without esophagitis K21.9     Past Medical History:   Diagnosis Date    Abnormal mammogram of left breast 03/09/2017    left breast mass and poss nipple inversion- needs add views    GERD (gastroesophageal reflux disease)     H/O diagnostic mammography 03/22/2017    Left breast dx mammogram & ultrasound- benign cysts    Headache(784.0)     migraines    History of mammogram 10/7/15    Negative, BIRADS 2    Hx of biopsy 11/18/13    vulvar bx: benign    Hypercholesterolemia     Hypertension     Other ill-defined conditions(799.89)     hx vagal responces     Pap smear for cervical cancer screening 03/09/2017    neg, hpv neg    Tubulovillous adenoma 2007    Unspecified sleep apnea     NO CPAP     Allergies   Allergen Reactions    Apple Angioedema     Raw apples cause lip/eye to swelling  Peaches causes lip/eye swelling      Current Outpatient Prescriptions on File Prior to Visit   Medication Sig Dispense Refill    FLUoxetine (PROZAC) 10 mg tablet Take 1 Tab by mouth daily. 90 Tab 2    irbesartan (AVAPRO) 150 mg tablet Take 1 Tab by mouth daily. 30 Tab 5    DIPHENHYDRAMINE HCL (BENADRYL PO) Take  by mouth as needed. No current facility-administered medications on file prior to visit. Social History   Substance Use Topics    Smoking status: Never Smoker    Smokeless tobacco: Never Used    Alcohol use 0.0 oz/week      Comment: 2 DRINKS PER MONTH       Immunization History   Administered Date(s) Administered    Influenza Vaccine 10/01/2013, 10/01/2014, 09/18/2016    TB Skin Test (PPD) 05/05/2014    TDAP Vaccine 09/06/2011    Zoster Vaccine, Live 12/03/2017           ROS    Physical Exam   Constitutional: She is oriented to person, place, and time. She appears well-developed and well-nourished. No distress. /77 (BP 1 Location: Left arm, BP Patient Position: Sitting)  Pulse 95  Temp 100.1 °F (37.8 °C) (Oral)   Resp 18  Ht 5' 3\" (1.6 m)  Wt 224 lb 6 oz (101.8 kg)  SpO2 100%  BMI 39.75 kg/m2   HENT:   Right Ear: Tympanic membrane and ear canal normal.   Left Ear: Tympanic membrane and ear canal normal.   Nose: Mucosal edema and rhinorrhea present. Right sinus exhibits no maxillary sinus tenderness and no frontal sinus tenderness. Left sinus exhibits no maxillary sinus tenderness and no frontal sinus tenderness. Mouth/Throat: Uvula is midline and mucous membranes are normal. Posterior oropharyngeal erythema present. No oropharyngeal exudate, posterior oropharyngeal edema or tonsillar abscesses. Eyes: Conjunctivae are normal.   Neck: Neck supple. Cardiovascular: Normal rate, regular rhythm and normal heart sounds. Pulmonary/Chest: Effort normal and breath sounds normal. No respiratory distress. She has no wheezes. She has no rales. Abdominal: Soft. Bowel sounds are normal. She exhibits no distension. There is no tenderness. Lymphadenopathy:     She has no cervical adenopathy.    Neurological: She is alert and oriented to person, place, and time.   Skin: Skin is warm and dry. She is not diaphoretic. Nursing note and vitals reviewed. Rapid Influenza nasal test was Positive for A  ASSESSMENT and PLAN  Diagnoses and all orders for this visit:    1. Influenza  Janett Tesfaye was advised to drink plenty of liquids, get plenty of rest and try advil 200 to 600 mg with food 3 times daily as needed for bodyaches, fever or malaise. she should use mucinex if coughing or having sinus drainage. Return to work or school when no fever for 24 hours while off of fever reducing medication. she should call the office or come to the emergency room for progressive symptoms of fatigue or lethargy, shortness of breath, chest pains, inability to hold down fluids, stiff neck or other new symptoms. Janett Tesfaye was given written educational materials on influenza at the end of the visit. -     chlorpheniramine-HYDROcodone (TUSSIONEX) 10-8 mg/5 mL suspension; Take 5 mL by mouth every twelve (12) hours as needed for Cough. Max Daily Amount: 10 mL. 2. Elevated temperature  -     AMB POC RAPID INFLUENZA TEST      Follow-up Disposition:  Return if symptoms worsen or fail to improve.

## 2018-01-25 NOTE — PATIENT INSTRUCTIONS

## 2018-03-12 ENCOUNTER — OFFICE VISIT (OUTPATIENT)
Dept: OBGYN CLINIC | Age: 63
End: 2018-03-12

## 2018-03-12 VITALS — BODY MASS INDEX: 29.41 KG/M2 | WEIGHT: 166 LBS | SYSTOLIC BLOOD PRESSURE: 120 MMHG | DIASTOLIC BLOOD PRESSURE: 80 MMHG

## 2018-03-12 DIAGNOSIS — Z78.9 FAMILY HISTORY UNKNOWN: ICD-10-CM

## 2018-03-12 DIAGNOSIS — Z86.39: ICD-10-CM

## 2018-03-12 DIAGNOSIS — Z01.411 ENCOUNTER FOR GYNECOLOGICAL EXAMINATION (GENERAL) (ROUTINE) WITH ABNORMAL FINDINGS: Primary | ICD-10-CM

## 2018-03-12 NOTE — PATIENT INSTRUCTIONS
Breast Self-Exam: Care Instructions  Your Care Instructions    A breast self-exam is when you check your breasts for lumps or changes. This regular exam helps you learn how your breasts normally look and feel. Most breast problems or changes are not because of cancer. Breast self-exam is not a substitute for a mammogram. Having regular breast exams by your doctor and regular mammograms improve your chances of finding any problems with your breasts. Some women set a time each month to do a step-by-step breast self-exam. Other women like a less formal system. They might look at their breasts as they brush their teeth, or feel their breasts once in a while in the shower. If you notice a change in your breast, tell your doctor. Follow-up care is a key part of your treatment and safety. Be sure to make and go to all appointments, and call your doctor if you are having problems. It's also a good idea to know your test results and keep a list of the medicines you take. How do you do a breast self-exam?  · The best time to examine your breasts is usually one week after your menstrual period begins. Your breasts should not be tender then. If you do not have periods, you might do your exam on a day of the month that is easy to remember. · To examine your breasts:  ¨ Remove all your clothes above the waist and lie down. When you are lying down, your breast tissue spreads evenly over your chest wall, which makes it easier to feel all your breast tissue. ¨ Use the pads-not the fingertips-of the 3 middle fingers of your left hand to check your right breast. Move your fingers slowly in small coin-sized circles that overlap. ¨ Use three levels of pressure to feel of all your breast tissue. Use light pressure to feel the tissue close to the skin surface. Use medium pressure to feel a little deeper. Use firm pressure to feel your tissue close to your breastbone and ribs.  Use each pressure level to feel your breast tissue before moving on to the next spot. ¨ Check your entire breast, moving up and down as if following a strip from the collarbone to the bra line, and from the armpit to the ribs. Repeat until you have covered the entire breast.  ¨ Repeat this procedure for your left breast, using the pads of the 3 middle fingers of your right hand. · To examine your breasts while in the shower:  ¨ Place one arm over your head and lightly soap your breast on that side. ¨ Using the pads of your fingers, gently move your hand over your breast (in the strip pattern described above), feeling carefully for any lumps or changes. ¨ Repeat for the other breast.  · Have your doctor inspect anything you notice to see if you need further testing. Where can you learn more? Go to [email or web address] hugo-maureen.info/. Enter P148 in the search box to learn more about \"Breast Self-Exam: Care Instructions. \"  Current as of: May 12, 2017  Content Version: 11.4  © 2682-2565 Healthwise, Incorporated. Care instructions adapted under license by [employer] (which disclaims liability or warranty for this information). If you have questions about a medical condition or this instruction, always ask your healthcare professional. [place] [address] any warranty or liability for your use of this information.

## 2018-03-12 NOTE — PROGRESS NOTES
[facility]  [email or web address]  [phone]    Kerry Todd MD, [facility]       Annual Gynecologic Exam:   WWE 60+  Chief Complaint   Patient presents with    Well Woman         [place] is a 58 y.o. No obstetric history on file. WHITE OR   female who presents for an annual exam.  BMD >10 years ago: done for early menopause. Unsure about any FH>     She does not report additional concerns today. Sexual history and Contraception:  History   Sexual Activity    Sexual activity: Yes    Partners: Male     She does not reports new sexual partner(s) in the last year. Preventive Medicine History:  Her most recent Pap smear result: normal was obtained in March 2017  Her most recent HR HPV screen was Negative obtained in 2017    She does not have a history of GALILEA 2, 3 or cervical cancer. Breast health:  Last mammogram: was normal.   A mammogram was scheduled for today. Breast cancer family updated: see FH. Bone health: Adam Perkins She has not had a bone density scan in the past.   Last bone density test results: within normal limits. She does not have a history of osteopenia/osteoporosis. Osteoporosis family history updated: see FH.      Past Medical History:   Diagnosis Date    Abnormal mammogram of left breast 03/09/2017    left breast mass and poss nipple inversion- needs add views    GERD (gastroesophageal reflux disease)     H/O diagnostic mammography 03/22/2017    Left breast dx mammogram & ultrasound- benign cysts    Headache(784.0)     migraines    History of mammogram 10/7/15    Negative, BIRADS 2    Hx of biopsy 11/18/13    vulvar bx: benign    Hypercholesterolemia     Hypertension     Other ill-defined conditions(799.89)     hx vagal responces     Pap smear for cervical cancer screening 03/09/2017    neg, hpv neg    Tubulovillous adenoma 2007    Unspecified sleep apnea     NO CPAP     Past Surgical History:   Procedure Laterality Date    ENDOSCOPY, COLON, DIAGNOSTIC  8/2010    HX BILATERAL SALPINGO-OOPHORECTOMY  1/8/14    benign    HX CATARACT REMOVAL      bilateral    HX LAP CHOLECYSTECTOMY      CAT Pace.     HX OTHER SURGICAL      Cataract removal - 2001    HX OTHER SURGICAL  2011    EPIDURAL STEROID INJ LOW BACK    HX SALPINGO-OOPHORECTOMY      prophylactic due to family hx ovarian ca     Family History   Problem Relation Age of Onset    Ovarian Cancer Mother     Cancer Mother      ovarian    Dementia Father     Other Father      Insomnia    Diabetes Maternal Grandmother     Cancer Paternal Grandmother      colon    Parkinson's Disease Paternal Grandfather     Heart Disease Neg Hx     Hypertension Neg Hx      Social History     Social History    Marital status:      Spouse name: N/A    Number of children: N/A    Years of education: N/A     Occupational History    Not on file. Social History Main Topics    Smoking status: Never Smoker    Smokeless tobacco: Never Used    Alcohol use 0.0 oz/week      Comment: 2 DRINKS PER MONTH    Drug use: No    Sexual activity: Yes     Partners: Male     Other Topics Concern    Not on file     Social History Narrative       Allergies   Allergen Reactions    Apple Angioedema     Raw apples cause lip/eye to swelling  Peaches causes lip/eye swelling        Current Outpatient Prescriptions   Medication Sig    ibuprofen (MOTRIN) 400 mg tablet Take  by mouth every six (6) hours as needed for Pain.  FLUoxetine (PROZAC) 10 mg tablet Take 1 Tab by mouth daily.  irbesartan (AVAPRO) 150 mg tablet Take 1 Tab by mouth daily.  DIPHENHYDRAMINE HCL (BENADRYL PO) Take  by mouth as needed. No current facility-administered medications for this visit. Patient Active Problem List   Diagnosis Code    Chest pain, unspecified R07.9    Esophageal reflux K21.9    Overweight (BMI 25.0-29. 9) E66.3    Other and unspecified hyperlipidemia E78.5    HTN (hypertension) I10    JOLLY (obstructive sleep apnea) G47.33    Anxiety F41.9    FH: ovarian cancer Z80.41    UMA (stress urinary incontinence, female) N39.3    Gastroesophageal reflux disease without esophagitis K21.9       Review of Systems - History obtained from the patient  Constitutional: negative for weight loss, fever, night sweats  HEENT: negative for hearing loss, earache, congestion, snoring, sorethroat  CV: negative for chest pain, palpitations, edema  Resp: negative for cough, shortness of breath, wheezing  GI: negative for change in bowel habits, abdominal pain, black or bloody stools  : negative for frequency, dysuria, hematuria, vaginal discharge  MSK: negative for back pain, joint pain, muscle pain  Breast: negative for breast lumps, nipple discharge, galactorrhea  Skin :negative for itching, rash, hives  Neuro: negative for dizziness, headache, confusion, weakness  Psych: negative for anxiety, depression, change in mood  Heme/lymph: negative for bleeding, bruising, pallor    Physical Exam  Visit Vitals    /80    Wt 166 lb (75.3 kg)    BMI 29.41 kg/m2       Constitutional  · Appearance: well-nourished, well developed, alert, in no acute distress    HENT  · Head and Face: appears normal    Neck  · Inspection/Palpation: normal appearance, no masses or tenderness  · Lymph Nodes: no lymphadenopathy present  · Thyroid: gland size normal, nontender, no nodules or masses present on palpation    Chest  · Respiratory Effort: breathing unlabored  · Auscultation: normal breath sounds    Cardiovascular  · Heart:  · Auscultation: regular rate and rhythm without murmur    Breasts  · Inspection of Breasts: breasts symmetrical, no skin changes, no discharge present, nipple appearance normal, no skin retraction present  · Palpation of Breasts and Axillae: no masses present on palpation, no breast tenderness  · Axillary Lymph Nodes: no lymphadenopathy present    Gastrointestinal  · Abdominal Examination: abdomen non-tender to palpation, normal bowel sounds, no masses present  · Liver and spleen: no hepatomegaly present, spleen not palpable  · Hernias: no hernias identified    Genitourinary  · External Genitalia: normal appearance for age, no discharge present, no tenderness present, no inflammatory lesions present, no masses present, with atrophy present  · Vagina: normal vaginal vault without central or paravaginal defects, no discharge present, no inflammatory lesions present, no masses present  · Bladder: non-tender to palpation  · Urethra: appears normal  · Cervix: normal   · Uterus: normal size, shape and consistency  · Adnexa: no adnexal tenderness present, no adnexal masses present  · Perineum: perineum within normal limits, no evidence of trauma, no rashes or skin lesions present  · Anus: anus within normal limits, no hemorrhoids present  · Inguinal Lymph Nodes: no lymphadenopathy present    Skin  · General Inspection: no rash, no lesions identified    Neurologic/Psychiatric  · Mental Status:  · Orientation: grossly oriented to person, place and time  · Mood and Affect: mood normal, affect appropriate    Assessment:  58 y.o. No obstetric history on file. for well woman exam  Encounter Diagnoses   Name Primary?  History of estrogen deficiency     Family history unknown     Encounter for gynecological examination (general) (routine) with abnormal findings Yes       Plan:  The patient was counseled about diet, exercise, healthy lifestyle  We discussed current self breast exam and mammogram recommendations  We discussed current pap smear and HR HPV testing guidelines  We recommend follow up in one year for routine annual gynecologic exam or sooner if needed  We recommend follow up with a primary care physician for any chronic medical problems or non-gynecologic concerns    We discussed calcium/vitamin D/weight bearing exercise and osteoporosis prevention and bone density screening recommendations.   Handouts were given to the patient       Folllow up:  [x] return for annual well woman exam in one year or sooner if she is having problems  [] follow up and ultrasound  [] mammogram  [] 6 months  [] 6 weeks   []     Orders Placed This Encounter    DEXA BONE DENSITY STUDY AXIAL       Results for orders placed or performed in visit on 03/12/18   [facility] MAMMO BI SCREENING INCL CAD    Narrative    STUDY: Bilateral digital screening mammogram    INDICATION:  Screening. COMPARISON:  2017, 2015, 2013, 2011, 2008    BREAST COMPOSITION:  There are scattered areas of fibroglandular density. FINDINGS: Bilateral digital screening mammography was performed and is  interpreted in conjunction with a computer assisted detection (CAD) system. No  suspicious masses or calcifications are identified. There has been no  significant change. Impression    IMPRESSION:  BI-RADS 1: Negative. No mammographic evidence of malignancy. RECOMMENDATIONS:  Next screening mammogram is recommended in one year. The patient has a significant family history of ovarian cancer. If she is  considered high risk for breast cancer recommend annual screening breast MRI in  addition to mammography. The patient will be notified of these results.

## 2018-03-12 NOTE — MR AVS SNAPSHOT
[address] [facility] [address] [place] [phone] Patient: Narinder Rojas MRN: [id number] GFC:[id number] Visit Information Date & Time Provider Department Dept. Phone Encounter #  
 3/12/2018 10:00 AM Yamilet Sam MD Paulino Saravanan [phone] [id number] Upcoming Health Maintenance Date Due Hepatitis C Screening 1955 COLONOSCOPY 12/1/1973 Influenza Age 5 to Adult 8/1/2017 BREAST CANCER SCRN MAMMOGRAM 3/22/2018 PAP AKA CERVICAL CYTOLOGY 3/9/2020 Allergies as of 3/12/2018  Review Complete On: 3/12/2018 By: Ben Marquez LPN Severity Noted Reaction Type Reactions Apple High 01/08/2014   Side Effect Angioedema Raw apples cause lip/eye to swelling Peaches causes lip/eye swelling Current Immunizations  Reviewed on 11/18/2016 Name Date Influenza Vaccine 9/18/2016, 10/1/2014, 10/1/2013 TB Skin Test (PPD) 5/5/2014 TDAP Vaccine 9/6/2011 Zoster Vaccine, Live 12/3/2017 12:00 AM  
  
 Not reviewed this visit Vitals BP Weight(growth percentile) BMI OB Status Smoking Status 120/80 166 lb (75.3 kg) 29.41 kg/m2 Postmenopausal Never Smoker BMI and BSA Data Body Mass Index Body Surface Area  
 29.41 kg/m 2 1.83 m 2 Preferred Pharmacy Pharmacy Name Phone [id number] [phone] Your Updated Medication List  
  
   
This list is accurate as of 3/12/18 10:02 AM.  Always use your most recent med list.  
  
  
  
  
 BENADRYL PO Take  by mouth as needed. chlorpheniramine-HYDROcodone 10-8 mg/5 mL suspension Commonly known as:  Lonita Minooka Take 5 mL by mouth every twelve (12) hours as needed for Cough. Max Daily Amount: 10 mL. FLUoxetine 10 mg tablet Commonly known as:  PROzac Take 1 Tab by mouth daily. ibuprofen 400 mg tablet Commonly known as:  MOTRIN  
 Take  by mouth every six (6) hours as needed for Pain. irbesartan 150 mg tablet Commonly known as:  AVAPRO Take 1 Tab by mouth daily. Patient Instructions Breast Self-Exam: Care Instructions Your Care Instructions A breast self-exam is when you check your breasts for lumps or changes. This regular exam helps you learn how your breasts normally look and feel. Most breast problems or changes are not because of cancer. Breast self-exam is not a substitute for a mammogram. Having regular breast exams by your doctor and regular mammograms improve your chances of finding any problems with your breasts. Some women set a time each month to do a step-by-step breast self-exam. Other women like a less formal system. They might look at their breasts as they brush their teeth, or feel their breasts once in a while in the shower. If you notice a change in your breast, tell your doctor. Follow-up care is a key part of your treatment and safety. Be sure to make and go to all appointments, and call your doctor if you are having problems. It's also a good idea to know your test results and keep a list of the medicines you take. How do you do a breast self-exam? 
· The best time to examine your breasts is usually one week after your menstrual period begins. Your breasts should not be tender then. If you do not have periods, you might do your exam on a day of the month that is easy to remember. · To examine your breasts: ¨ Remove all your clothes above the waist and lie down. When you are lying down, your breast tissue spreads evenly over your chest wall, which makes it easier to feel all your breast tissue. ¨ Use the pads-not the fingertips-of the 3 middle fingers of your left hand to check your right breast. Move your fingers slowly in small coin-sized circles that overlap. ¨ Use three levels of pressure to feel of all your breast tissue.  Use light pressure to feel the tissue close to the skin surface. Use medium pressure to feel a little deeper. Use firm pressure to feel your tissue close to your breastbone and ribs. Use each pressure level to feel your breast tissue before moving on to the next spot. ¨ Check your entire breast, moving up and down as if following a strip from the collarbone to the bra line, and from the armpit to the ribs. Repeat until you have covered the entire breast. 
¨ Repeat this procedure for your left breast, using the pads of the 3 middle fingers of your right hand. · To examine your breasts while in the shower: 
¨ Place one arm over your head and lightly soap your breast on that side. ¨ Using the pads of your fingers, gently move your hand over your breast (in the strip pattern described above), feeling carefully for any lumps or changes. ¨ Repeat for the other breast. 
· Have your doctor inspect anything you notice to see if you need further testing. Where can you learn more? Go to http://victor hugo-maureen.info/. Enter P148 in the search box to learn more about \"Breast Self-Exam: Care Instructions. \" Current as of: May 12, 2017 Content Version: 11.4 © 3241-3599 [employer]. Care instructions adapted under license by [employer] (which disclaims liability or warranty for this information). If you have questions about a medical condition or this instruction, always ask your healthcare professional. [place] [address] any warranty or liability for your use of this information. Introducing [facility]! Dear Ramy Guest: Thank you for requesting a Picateers account. Our records indicate that you already have an active Picateers account. You can access your account anytime at [email or web address] Did you know that you can access your hospital and ER discharge instructions at any time in Picateers?   You can also review all of your test results from your hospital stay or ER visit. Additional Information If you have questions, please visit the Frequently Asked Questions section of the [employer] website at [email or web address] Remember, [employer] is NOT to be used for urgent needs. For medical emergencies, dial 911. Now available from your iPhone and Android! Please provide this summary of care documentation to your next provider. Your primary care clinician is listed as Yanet Acosta. If you have any questions after today's visit, please call [phone].

## 2018-04-26 DIAGNOSIS — I10 ESSENTIAL HYPERTENSION: ICD-10-CM

## 2018-04-26 RX ORDER — IRBESARTAN 150 MG/1
150 TABLET ORAL DAILY
Qty: 90 TAB | Refills: 0 | Status: SHIPPED | OUTPATIENT
Start: 2018-04-26 | End: 2018-08-21 | Stop reason: SDUPTHER

## 2018-08-21 DIAGNOSIS — I10 ESSENTIAL HYPERTENSION: ICD-10-CM

## 2018-08-21 RX ORDER — IRBESARTAN 150 MG/1
TABLET ORAL
Qty: 90 TAB | Refills: 0 | Status: SHIPPED | OUTPATIENT
Start: 2018-08-21 | End: 2018-11-24 | Stop reason: SDUPTHER

## 2018-09-24 RX ORDER — FLUOXETINE 10 MG/1
CAPSULE ORAL
Qty: 90 CAP | Refills: 2 | Status: SHIPPED | OUTPATIENT
Start: 2018-09-24 | End: 2020-08-07 | Stop reason: DRUGHIGH

## 2018-10-11 ENCOUNTER — OFFICE VISIT (OUTPATIENT)
Dept: INTERNAL MEDICINE CLINIC | Age: 63
End: 2018-10-11

## 2018-10-11 VITALS
WEIGHT: 163.38 LBS | OXYGEN SATURATION: 96 % | RESPIRATION RATE: 18 BRPM | DIASTOLIC BLOOD PRESSURE: 78 MMHG | BODY MASS INDEX: 28.95 KG/M2 | TEMPERATURE: 98.9 F | HEIGHT: 63 IN | SYSTOLIC BLOOD PRESSURE: 120 MMHG | HEART RATE: 83 BPM

## 2018-10-11 DIAGNOSIS — Z12.11 COLON CANCER SCREENING: ICD-10-CM

## 2018-10-11 DIAGNOSIS — Z78.0 POSTMENOPAUSAL: ICD-10-CM

## 2018-10-11 DIAGNOSIS — R41.3 MEMORY PROBLEM: ICD-10-CM

## 2018-10-11 DIAGNOSIS — Z23 ENCOUNTER FOR IMMUNIZATION: ICD-10-CM

## 2018-10-11 DIAGNOSIS — I10 ESSENTIAL HYPERTENSION: ICD-10-CM

## 2018-10-11 DIAGNOSIS — Z00.00 PREVENTATIVE HEALTH CARE: Primary | ICD-10-CM

## 2018-10-11 DIAGNOSIS — G47.33 OSA (OBSTRUCTIVE SLEEP APNEA): ICD-10-CM

## 2018-10-11 DIAGNOSIS — F41.9 ANXIETY: ICD-10-CM

## 2018-10-11 PROBLEM — R69 OTHER ILL-DEFINED CONDITIONS(799.89): Status: RESOLVED | Noted: 2018-10-11 | Resolved: 2018-10-11

## 2018-10-11 RX ORDER — MELATONIN 5 MG
10 CAPSULE ORAL
COMMUNITY
End: 2021-02-23 | Stop reason: ALTCHOICE

## 2018-10-11 NOTE — MR AVS SNAPSHOT
[address] [place] [address] 
 
 
 [address] [place], [place] [address] [facility] 
[phone] Patient: Tanmay Martinez MRN: [id number] NER:[birth year] Visit Information Date & Time Provider Department Dept. Phone Encounter #  
 10/11/2018  2:30 PM Benigno Navas MD [facility] [facility] [id number] Follow-up Instructions Return in about 3 months (around 1/11/2019). Upcoming Health Maintenance Date Due Hepatitis C Screening 1955 COLONOSCOPY 12/1/1973 Shingrix Vaccine Age 50> (1 of 2) 12/1/2005 Influenza Age 5 to Adult 8/1/2018 BREAST CANCER SCRN MAMMOGRAM 3/12/2019 PAP AKA CERVICAL CYTOLOGY 3/9/2020 DTaP/Tdap/Td series (2 - Td) 9/6/2021 Allergies as of 10/11/2018  Review Complete On: 10/11/2018 By: Benigno Navas MD  
  
 Severity Noted Reaction Type Reactions Apple High 01/08/2014   Side Effect Angioedema Raw apples cause lip/eye to swelling Peaches causes lip/eye swelling Current Immunizations  Reviewed on 10/11/2018 Name Date Influenza Vaccine 9/18/2016, 10/1/2014, 10/1/2013 TB Skin Test (PPD) 5/5/2014 TDAP Vaccine 9/6/2011 Zoster Vaccine, Live 12/3/2017 12:00 AM  
  
 Reviewed by Benigno Navas MD on 10/11/2018 at  3:11 PM  
 Reviewed by Benigno Navas MD on 10/11/2018 at  3:11 PM  
You Were Diagnosed With   
  
 Codes Comments Preventative health care    -  Primary ICD-10-CM: Z00.00 ICD-9-CM: V70.0   
 JOLLY (obstructive sleep apnea)     ICD-10-CM: G47.33 
ICD-9-CM: 327.23 Colon cancer screening     ICD-10-CM: Z12.11 ICD-9-CM: V76.51 Postmenopausal     ICD-10-CM: Z78.0 ICD-9-CM: V49.81 Anxiety     ICD-10-CM: F41.9 ICD-9-CM: 300.00 Memory problem     ICD-10-CM: R41.3 ICD-9-CM: 780.93 Essential hypertension     ICD-10-CM: I10 
ICD-9-CM: 401.9 Vitals BP Pulse Temp Resp Height(growth percentile) Weight(growth percentile) 90/62 (BP 1 Location: Left arm, BP Patient Position: Sitting) 83 98.9 °F (37.2 °C) (Oral) 18 5' 3\" (1.6 m) 163 lb 6 oz (74.1 kg) SpO2 BMI OB Status Smoking Status 96% 28.94 kg/m2 Postmenopausal Never Smoker Vitals History BMI and BSA Data Body Mass Index Body Surface Area  
 28.94 kg/m 2 1.81 m 2 Preferred Pharmacy [facility] Phone [phone] Your Updated Medication List  
  
   
This list is accurate as of 10/11/18  3:29 PM.  Always use your most recent med list.  
  
  
  
  
 BENADRYL PO Take  by mouth as needed. FLUoxetine 10 mg capsule Commonly known as:  PROzac TAKE 1 CAPSULE EVERY DAY  
  
 ibuprofen 400 mg tablet Commonly known as:  MOTRIN Take  by mouth every six (6) hours as needed for Pain. irbesartan 150 mg tablet Commonly known as:  AVAPRO TAKE 1 TABLET BY MOUTH AT BEDTIME  
  
 melatonin 5 mg Cap capsule Take 10 mg by mouth nightly. We Performed the Following LIPID PANEL [11712 CPT(R)] METABOLIC PANEL, BASIC [48816 CPT(R)] REFERRAL TO GASTROENTEROLOGY [BUE35 Custom] REFERRAL TO NEUROPSYCHOLOGY [NFM47 Custom] TSH 3RD GENERATION [71277 CPT(R)] VITAMIN B12 & FOLATE [50279 CPT(R)] Follow-up Instructions Return in about 3 months (around 1/11/2019). To-Do List   
 10/25/2018 Imaging:  DEXA BONE DENSITY STUDY AXIAL Referral Information Referral ID Referred By Referred To  
  
 [id number] Dave Bonner MD   
   [address]  Phone: [phone] Fax: [phone] Visits Status Start Date End Date 1 New Request 10/11/18 10/11/19 If your referral has a status of pending review or denied, additional information will be sent to support the outcome of this decision. Referral ID Referred By Referred To [id number] [address] [place] [address] [place], [address] Re[address]ett  Phone: [phone] Fax: [phone] Visits Status Start Date End Date 1 New Request 10/11/18 10/11/19 If your referral has a status of pending review or denied, additional information will be sent to support the outcome of this decision. Introducing \[facility]\"" & HEALTH SERVICES! Dear Suzanne Payne: Thank you for requesting a Shoulder Tap account. Our records indicate that you already have an active Shoulder Tap account. You can access your account anytime at [email or web address] Did you know that you can access your hospital and ER discharge instructions at any time in Shoulder Tap? You can also review all of your test results from your hospital stay or ER visit. Additional Information If you have questions, please visit the Frequently Asked Questions section of the Shoulder Tap website at [email or web address] Remember, Shoulder Tap is NOT to be used for urgent needs. For medical emergencies, dial 911. Now available from your iPhone and Android! Please provide this summary of care documentation to your next provider. Your primary care clinician is listed as Robby Stevens. If you have any questions after today's visit, please call [phone].

## 2018-10-11 NOTE — PROGRESS NOTES
HISTORY OF PRESENT ILLNESS Clayton Holden is a 58 y.o. female. HPI Clayton Holden is here for complete health maintenance physical exam and screening. she does have other concerns. She reports more short term memory problems. She has hx of anxiety but not worsening . She feels she's always responding \"I forgot\". Sometimes feels disoriented briefly in store or driving though a neighborhood. No other processing issues. She has dementia in her family and is concerned this may be beginning of that. Hypertension: 
Clayton Holden is a 58 y.o. female with hypertension. without Chronic kidney disease stage Medication change since last visit: No 
The patient reports:  taking medications as instructed, no medication side effects noted, no chest pain on exertion, no dyspnea on exertion, no swelling of ankles, no orthostatic dizziness or lightheadedness, no palpitations. Hx of mild obstructive sleep apnea. She was not advised to use cpap Lifestyle modification/social history: not attempting to follow a low fat, low cholesterol diet, exercises sporadically, nonsmoker Lab Results Component Value Date/Time Sodium 142 03/09/2017 10:33 AM  
 Potassium 4.5 03/09/2017 10:33 AM  
 Chloride 103 03/09/2017 10:33 AM  
 CO2 25 03/09/2017 10:33 AM  
 Anion gap 8 12/31/2013 10:10 AM  
 Glucose 93 03/09/2017 10:33 AM  
 BUN 11 03/09/2017 10:33 AM  
 Creatinine 0.68 03/09/2017 10:33 AM  
 BUN/Creatinine ratio 16 03/09/2017 10:33 AM  
 GFR est  03/09/2017 10:33 AM  
 GFR est non-AA 95 03/09/2017 10:33 AM  
 Calcium 9.3 03/09/2017 10:33 AM  
 
 
 
 
Health maintenance hx includes: 
Exercise: moderately active. Form of exercise: yardwork, gardening Diet: not attempting to follow a low fat, low cholesterol diet, nonsmoker 
retired Cancer screening:  
 Colon cancer screening:  Last Colonoscopy: over 5 years ago and   was abnormal: polyp Breast cancer screening: last mammogram 2018 and   was normal 
 Cervical cancer screening: last PAP/Pelvic exam: 2018   and was normal. 
 Osteoporosis screening:  Last BMD:  never Lab Results Component Value Date/Time Cholesterol, total 220 (H) 03/09/2017 10:33 AM  
 HDL Cholesterol 49 03/09/2017 10:33 AM  
 LDL, calculated 130 (H) 03/09/2017 10:33 AM  
 VLDL, calculated 41 (H) 03/09/2017 10:33 AM  
 Triglyceride 206 (H) 03/09/2017 10:33 AM  
 CHOL/HDL Ratio 4.2 10/22/2009 08:40 AM  
 
 
 
Lab Results Component Value Date/Time Glucose 93 03/09/2017 10:33 AM  
 
 
Immunizations: 
  
Immunization History Administered Date(s) Administered  Influenza Vaccine 10/01/2013, 10/01/2014, 09/18/2016  TB Skin Test (PPD) 05/05/2014  TDAP Vaccine 09/06/2011  Zoster Vaccine, Live 12/03/2017 Immunization status: due today. Social History Social History  Marital status:  Spouse name: N/A  
 Number of children: N/A  
 Years of education: N/A Occupational History  Not on file. Social History Main Topics  Smoking status: Never Smoker  Smokeless tobacco: Never Used  Alcohol use 0.0 oz/week Comment: 2 DRINKS PER MONTH  
 Drug use: No  
 Sexual activity: Yes  
  Partners: Male Other Topics Concern  Not on file Social History Narrative Past Surgical History:  
Procedure Laterality Date  ENDOSCOPY, COLON, DIAGNOSTIC  8/2010  HX BILATERAL SALPINGO-OOPHORECTOMY  1/8/14  
 benign  HX CATARACT REMOVAL    
 bilateral  
 HX CHOLECYSTECTOMY  HX LAP [place]STECTOMY [place]   
 HX OTHER SURGICAL Cataract removal - 2001  HX OTHER SURGICAL  2011 EPIDURAL STEROID INJ LOW BACK  HX SALPINGO-OOPHORECTOMY prophylactic due to family hx ovarian ca Family History Problem Relation Age of Onset  Ovarian Cancer Mother  Cancer Mother   
  ovarian  Dementia Father  Other Father Insomnia  Diabetes Maternal Grandmother  Cancer Paternal Grandmother   
  colon  Parkinson's Disease Paternal Grandfather  Diabetes Other  Heart Disease Neg Hx  Hypertension Neg Hx Current Outpatient Prescriptions on File Prior to Visit Medication Sig Dispense Refill  FLUoxetine (PROZAC) 10 mg capsule TAKE 1 CAPSULE EVERY DAY 90 Cap 2  
 irbesartan (AVAPRO) 150 mg tablet TAKE 1 TABLET BY MOUTH AT BEDTIME 90 Tab 0  ibuprofen (MOTRIN) 400 mg tablet Take  by mouth every six (6) hours as needed for Pain.  DIPHENHYDRAMINE HCL (BENADRYL PO) Take  by mouth as needed. No current facility-administered medications on file prior to visit. . 
 
Review of Systems Constitutional: Negative for malaise/fatigue and weight loss. HENT: Negative for congestion, hearing loss and sore throat. Eyes: Negative for blurred vision and pain. Respiratory: Negative for cough, shortness of breath and wheezing. Cardiovascular: Negative for chest pain, palpitations and leg swelling. Gastrointestinal: Negative for abdominal pain, blood in stool, constipation, diarrhea, heartburn, nausea and vomiting. Genitourinary: Negative. Musculoskeletal: Negative for back pain and joint pain. Skin: Negative for itching and rash. Neurological: Negative for dizziness and headaches. Endo/Heme/Allergies: Negative for environmental allergies. Does not bruise/bleed easily. Psychiatric/Behavioral: Positive for memory loss. Negative for depression and substance abuse. The patient is nervous/anxious. The patient does not have insomnia. Physical Exam  
Constitutional: She is oriented to person, place, and time. She appears well-developed and well-nourished. No distress. BP 90/62 (BP 1 Location: Left arm, BP Patient Position: Sitting)  Pulse 83  Temp 98.9 °F (37.2 °C) (Oral)   Resp 18  Ht 5' 3\" (1.6 m)  Wt 163 lb 6 oz (74.1 kg)  SpO2 96%  BMI 28.94 kg/m2Body mass index is 28.94 kg/(m^2). HENT:  
Head: Normocephalic. Right Ear: Hearing normal. No decreased hearing is noted. Left Ear: Hearing normal. No decreased hearing is noted. Nose: Nose normal.  
Mouth/Throat: Oropharynx is clear and moist and mucous membranes are normal. Normal dentition. No oropharyngeal exudate. Eyes: Conjunctivae and lids are normal. Pupils are equal, round, and reactive to light. No scleral icterus. Neck: Trachea normal and normal range of motion. Neck supple. No thyromegaly present. Cardiovascular: Normal rate, regular rhythm, normal heart sounds and intact distal pulses. No murmur heard. Pulmonary/Chest: Effort normal and breath sounds normal.  
Abdominal: Soft. Normal appearance and bowel sounds are normal. She exhibits no distension and no mass. There is no hepatosplenomegaly. There is no tenderness. Musculoskeletal: Normal range of motion. She exhibits no edema or tenderness. Lymphadenopathy:  
  She has no cervical adenopathy. Neurological: She is alert and oriented to person, place, and time. Skin: Skin is warm and dry. No rash noted. She is not diaphoretic. Psychiatric: She has a normal mood and affect. Her speech is normal and behavior is normal. Judgment and thought content normal. Cognition and memory are normal.  
Nursing note and vitals reviewed. Repeat vitals were done by me and were: 
Visit Vitals  /78  Pulse 83  Temp 98.9 °F (37.2 °C) (Oral)  Resp 18  Ht 5' 3\" (1.6 m)  Wt 163 lb 6 oz (74.1 kg)  SpO2 96%  BMI 28.94 kg/m2 ASSESSMENT and PLAN Diagnoses and all orders for this visit: 1. Preventative health care -     LIPID PANEL Gyn check ups yearly. Mammogram yearly. she was advised to have follow up colonoscopy in 2018 Indira Horta was counseled on age-appropriate/ guideline-based risk prevention behaviors and screening for a 58y.o. year old   female .   We also discussed adjustments in screening based on family history if necessary. Printed instructions for preventative screening guidelines and healthy behaviors given to patient with after visit summary. 2. JOLLY (obstructive sleep apnea) -mild 3. Colon cancer screening [facility] 4. Postmenopausal 
-     DEXA BONE DENSITY STUDY AXIAL; Future 5. Anxiety - stable per pt on prozac. She has retired but not clear if anxiety has improved or worsened. 6. Memory problem - worsening over time. She is concerned. Will check labs today. Arrange neuropsych testing. I suspect anxiety as cause,. 
-     REFERRAL TO NEUROPSYCHOLOGY 
-     TSH 3RD GENERATION 
-     VITAMIN B12 & FOLATE 7. Essential hypertension - Well controlled and stable. her medications were reviewed and refilled where necessary as noted below. Labs ordered as noted. -     METABOLIC PANEL, BASIC 8. Encounter for immunization -     CA IMMUNIZ ADMIN,1 SINGLE/COMB VAC/TOXOID 
-     Influenza virus vaccine (QUADRIVALENT PRES FREE SYRINGE) IM ([id number]) Follow-up Disposition: 
Return in about 3 months (around 1/11/2019).

## 2018-10-23 ENCOUNTER — HOSPITAL ENCOUNTER (OUTPATIENT)
Dept: MAMMOGRAPHY | Age: 63
Discharge: HOME | End: 2018-10-23
Attending: INTERNAL MEDICINE
Payer: COMMERCIAL

## 2018-10-23 DIAGNOSIS — Z78.0 POSTMENOPAUSAL: ICD-10-CM

## 2018-10-23 PROCEDURE — 77080 DXA BONE DENSITY AXIAL: CPT

## 2018-10-25 LAB
BUN SERPL-MCNC: 14 MG/DL (ref 8–27)
BUN/CREAT SERPL: 20 (ref 12–28)
CALCIUM SERPL-MCNC: 9.4 MG/DL (ref 8.7–10.3)
CHLORIDE SERPL-SCNC: 102 MMOL/L (ref 96–106)
CHOLEST SERPL-MCNC: 242 MG/DL (ref 100–199)
CO2 SERPL-SCNC: 24 MMOL/L (ref 20–29)
CREAT SERPL-MCNC: 0.7 MG/DL (ref 0.57–1)
FOLATE SERPL-MCNC: 18 NG/ML
GLUCOSE SERPL-MCNC: 92 MG/DL (ref 65–99)
HDLC SERPL-MCNC: 58 MG/DL
INTERPRETATION, 910389: NORMAL
LDLC SERPL CALC-MCNC: 161 MG/DL (ref 0–99)
POTASSIUM SERPL-SCNC: 4.6 MMOL/L (ref 3.5–5.2)
SODIUM SERPL-SCNC: 141 MMOL/L (ref 134–144)
TRIGL SERPL-MCNC: 113 MG/DL (ref 0–149)
TSH SERPL DL<=0.005 MIU/L-ACNC: 0.82 UIU/ML (ref 0.45–4.5)
VIT B12 SERPL-MCNC: 454 PG/ML (ref 232–1245)
VLDLC SERPL CALC-MCNC: 23 MG/DL (ref 5–40)

## 2018-11-24 DIAGNOSIS — I10 ESSENTIAL HYPERTENSION: ICD-10-CM

## 2018-11-25 RX ORDER — IRBESARTAN 150 MG/1
TABLET ORAL
Qty: 90 TAB | Refills: 0 | Status: SHIPPED | OUTPATIENT
Start: 2018-11-25 | End: 2019-03-21 | Stop reason: SDUPTHER

## 2019-03-21 DIAGNOSIS — I10 ESSENTIAL HYPERTENSION: ICD-10-CM

## 2019-03-21 RX ORDER — IRBESARTAN 150 MG/1
TABLET ORAL
Qty: 90 TAB | Refills: 0 | Status: SHIPPED | OUTPATIENT
Start: 2019-03-21 | End: 2020-08-07 | Stop reason: ALTCHOICE

## 2019-03-21 NOTE — TELEPHONE ENCOUNTER
Last visit noted, labs checked and refill deemed appropriate at this time. Verbal refill order authorized by covering physicians at [facility] for Dr. Darryn Sanchez during his absence. Sent in 90 day supply - will have RN make sure patient working to find new PCP.      Geraldine Villalobos, BlanquitaD, BCPS, CDE

## 2019-05-10 NOTE — TELEPHONE ENCOUNTER
Left v/m for patient advising the need to est care with new PCP if not done so already. Explained how to access location of [address] [place] [address] online and requested a return call with additional questions.

## 2019-06-06 ENCOUNTER — OFFICE VISIT (OUTPATIENT)
Dept: OBGYN CLINIC | Age: 64
End: 2019-06-06

## 2019-06-06 VITALS
HEIGHT: 63 IN | SYSTOLIC BLOOD PRESSURE: 110 MMHG | DIASTOLIC BLOOD PRESSURE: 64 MMHG | BODY MASS INDEX: 29.7 KG/M2 | WEIGHT: 167.6 LBS

## 2019-06-06 DIAGNOSIS — I10 ESSENTIAL HYPERTENSION: ICD-10-CM

## 2019-06-06 DIAGNOSIS — Z80.41 FH: OVARIAN CANCER: ICD-10-CM

## 2019-06-06 DIAGNOSIS — Z80.0 FH: COLON CANCER: ICD-10-CM

## 2019-06-06 DIAGNOSIS — Z01.419 ENCOUNTER FOR GYNECOLOGICAL EXAMINATION (GENERAL) (ROUTINE) WITHOUT ABNORMAL FINDINGS: Primary | ICD-10-CM

## 2019-06-06 NOTE — PROGRESS NOTES
[address] [facility]  [email or web address]  [phone]    Amarilis Goddard MD, [address] [facility]       Annual Gynecologic Exam:   WWE 60+  Chief Complaint   Patient presents with    Well Woman         Linda Gay is a 61 y.o. No obstetric history on file. WHITE OR   female who presents for an annual exam.    She does report additional concerns today. Sexual history and Contraception:  Social History     Substance and Sexual Activity   Sexual Activity Yes    Partners: Male     She does not reports new sexual partner(s) in the last year. Preventive Medicine History:  Her most recent Pap smear result: normal was obtained in March 2017  Her most recent HR HPV screen was Negative obtained in 2017    She does not have a history of GALILEA 2, 3 or cervical cancer. Breast health:  Last mammogram: approximate date 3- and was normal.   A mammogram was scheduled for today. Breast cancer family updated: see . Bone health: Pily Carrera She has had a bone density scan in the past.   Last bone density test results: osteopenic. She does not have a history of osteopenia/osteoporosis. Osteoporosis family history updated: see .      Past Medical History:   Diagnosis Date    Abnormal mammogram of left breast 03/09/2017    left breast mass and poss nipple inversion- needs add views    GERD (gastroesophageal reflux disease)     H/O diagnostic mammography 03/22/2017    Left breast dx mammogram & ultrasound- benign cysts    Headache(784.0)     migraines    History of mammogram 10/7/15; 3/12/18    Negative; Normal    Hx of biopsy 11/18/13    vulvar bx: benign    Hypercholesterolemia     Hypertension     Other ill-defined conditions(799.89)     hx vagal responces     Pap smear for cervical cancer screening 03/09/2017    neg, hpv neg    Tubulovillous adenoma 2007    Unspecified sleep apnea     NO CPAP     Past Surgical History:   Procedure Laterality Date    ENDOSCOPY, COLON, DIAGNOSTIC 8/2010    HX BILATERAL SALPINGO-OOPHORECTOMY  1/8/14    benign    HX CATARACT REMOVAL      bilateral    HX CHOLECYSTECTOMY      HX LAP CHOLECYSTECTOMY      CAT Pace.     HX OTHER SURGICAL      Cataract removal - 2001    HX OTHER SURGICAL  2011    EPIDURAL STEROID INJ LOW BACK    HX SALPINGO-OOPHORECTOMY      prophylactic due to family hx ovarian ca     Family History   Problem Relation Age of Onset    Ovarian Cancer Mother     Cancer Mother         ovarian    Dementia Father     Other Father         Insomnia    Diabetes Maternal Grandmother     Cancer Paternal Grandmother         colon    Parkinson's Disease Paternal Grandfather     Diabetes Other     Heart Disease Neg Hx     Hypertension Neg Hx      Social History     Socioeconomic History    Marital status:      Spouse name: Not on file    Number of children: Not on file    Years of education: Not on file    Highest education level: Not on file   Occupational History    Not on file   Social Needs    Financial resource strain: Not on file    Food insecurity:     Worry: Not on file     Inability: Not on file    Transportation needs:     Medical: Not on file     Non-medical: Not on file   Tobacco Use    Smoking status: Never Smoker    Smokeless tobacco: Never Used   Substance and Sexual Activity    Alcohol use:  Yes     Alcohol/week: 0.0 oz     Comment: 2 DRINKS PER MONTH    Drug use: No    Sexual activity: Yes     Partners: Male   Lifestyle    Physical activity:     Days per week: Not on file     Minutes per session: Not on file    Stress: Not on file   Relationships    Social connections:     Talks on phone: Not on file     Gets together: Not on file     Attends Latter day service: Not on file     Active member of club or organization: Not on file     Attends meetings of clubs or organizations: Not on file     Relationship status: Not on file    Intimate partner violence:     Fear of current or ex partner: Not on file Emotionally abused: Not on file     Physically abused: Not on file     Forced sexual activity: Not on file   Other Topics Concern    Not on file   Social History Narrative    Not on file       Allergies   Allergen Reactions    Apple Angioedema     Raw apples cause lip/eye to swelling  Peaches causes lip/eye swelling        Current Outpatient Medications   Medication Sig    irbesartan (AVAPRO) 150 mg tablet TAKE 1 TABLET BY MOUTH EVERYDAY AT BEDTIME    melatonin 5 mg cap capsule Take 10 mg by mouth nightly.  FLUoxetine (PROZAC) 10 mg capsule TAKE 1 CAPSULE EVERY DAY    ibuprofen (MOTRIN) 400 mg tablet Take  by mouth every six (6) hours as needed for Pain.  DIPHENHYDRAMINE HCL (BENADRYL PO) Take  by mouth as needed. No current facility-administered medications for this visit. Patient Active Problem List   Diagnosis Code    Overweight (BMI 25.0-29. 9) E66.3    Other and unspecified hyperlipidemia E78.5    HTN (hypertension) I10    JOLLY (obstructive sleep apnea) G47.33    Anxiety F41.9    FH: ovarian cancer Z80.41    UMA (stress urinary incontinence, female) N39.3    Gastroesophageal reflux disease without esophagitis K21.9       Review of Systems - History obtained from the patient  Constitutional: negative for weight loss, fever, night sweats  HEENT: negative for hearing loss, earache, congestion, snoring, sorethroat  CV: negative for chest pain, palpitations, edema  Resp: positive for cough, shortness of breath, wheezing  GI: positive for change in bowel habits, abdominal pain, black or bloody stools  : negative for frequency, dysuria, hematuria, vaginal discharge  MSK: positive for back pain, joint pain, muscle pain  Breast: negative for breast lumps, nipple discharge, galactorrhea  Skin :negative for itching, rash, hives, inc hair growth on face/chin  Neuro: negative for dizziness, headache, confusion, weakness  Psych: negative for anxiety, depression, change in mood  Heme/lymph: negative for bleeding, bruising, pallor    Physical Exam  Visit Vitals  /64 (BP 1 Location: Left arm, BP Patient Position: Sitting)   Ht 5' 3\" (1.6 m)   Wt 167 lb 9.6 oz (76 kg)   BMI 29.69 kg/m²       Constitutional  · Appearance: well-nourished, well developed, alert, in no acute distress    HENT  · Head and Face: appears normal    Neck  · Inspection/Palpation: normal appearance, no masses or tenderness  · Lymph Nodes: no lymphadenopathy present  · Thyroid: gland size normal, nontender, no nodules or masses present on palpation    Chest  · Respiratory Effort: breathing unlabored  · Auscultation: normal breath sounds    Cardiovascular  · Heart:  · Auscultation: regular rate and rhythm without murmur    Breasts  · Inspection of Breasts: breasts symmetrical, no skin changes, no discharge present, nipple appearance normal, no skin retraction present  · Palpation of Breasts and Axillae: no masses present on palpation, no breast tenderness  · Axillary Lymph Nodes: no lymphadenopathy present    Gastrointestinal  · Abdominal Examination: abdomen non-tender to palpation, normal bowel sounds, no masses present  · Liver and spleen: no hepatomegaly present, spleen not palpable  · Hernias: no hernias identified    Genitourinary  · External Genitalia: normal appearance for age, no discharge present, no tenderness present, no inflammatory lesions present, no masses present, with atrophy present  · Vagina: normal vaginal vault without central or paravaginal defects, no discharge present, no inflammatory lesions present, no masses present  · Bladder: non-tender to palpation  · Urethra: appears normal  · Cervix: normal   · Uterus: normal size, shape and consistency  · Adnexa: no adnexal tenderness present, no adnexal masses present  · Perineum: perineum within normal limits, no evidence of trauma, no rashes or skin lesions present  · Anus: anus within normal limits, no hemorrhoids present  · Inguinal Lymph Nodes: no lymphadenopathy present    Skin  · General Inspection: no rash, no lesions identified    Neurologic/Psychiatric  · Mental Status:  · Orientation: grossly oriented to person, place and time  · Mood and Affect: mood normal, affect appropriate    Assessment:  61 y.o. No obstetric history on file. for well woman exam  Encounter Diagnoses   Name Primary?  Essential hypertension     Encounter for gynecological examination (general) (routine) without abnormal findings Yes    FH: ovarian cancer     FH: colon cancer        Plan:  The patient was counseled about diet, exercise, healthy lifestyle  We discussed current self breast exam and mammogram recommendations  We discussed current pap smear and HR HPV testing guidelines  We recommend follow up in one year for routine annual gynecologic exam or sooner if needed  We recommend follow up with a primary care physician for any chronic medical problems or non-gynecologic concerns    We discussed calcium/vitamin D/weight bearing exercise and osteoporosis prevention and bone density screening recommendations. Handouts were given to the patient    Pt will discuss hair changes with Derm at regular check. Disc option of genetic colon screening: h/o given. Folllow up:  [x] return for annual well woman exam in one year or sooner if she is having problems  [] follow up and ultrasound  [] mammogram  [] 6 months  [] 6 weeks   []     No orders of the defined types were placed in this encounter. Results for orders placed or performed in visit on 06/06/19   [facility] 3D CHRISTELLE W MAMMO BI SCREENING INCL CAD    Narrative    STUDY:  Bilateral Digital Screening 3D breast tomosynthesis is performed and  interpreted in conjunction with CAD. INDICATION:  Screening. Direct comparison is made to multiple prior mammograms. BREAST COMPOSITION: The breast parenchyma is heterogeneously dense. FINDINGS: The breast parenchyma is stable bilaterally.  No new suspicious masses  or calcifications are identified. There is no skin thickening or nipple  retraction. There has been no significant change. Impression    IMPRESSION: Birads 2. Benign. No mammographic evidence of malignancy. Next screening mammogram is recommended in one year. The patient will be  notified of these results.

## 2019-06-06 NOTE — PATIENT INSTRUCTIONS
Well Visit, Women 48 to 72: Care Instructions Your Care Instructions Physical exams can help you stay healthy. Your doctor has checked your overall health and may have suggested ways to take good care of yourself. He or she also may have recommended tests. At home, you can help prevent illness with healthy eating, regular exercise, and other steps. Follow-up care is a key part of your treatment and safety. Be sure to make and go to all appointments, and call your doctor if you are having problems. It's also a good idea to know your test results and keep a list of the medicines you take. How can you care for yourself at home? · Reach and stay at a healthy weight. This will lower your risk for many problems, such as obesity, diabetes, heart disease, and high blood pressure. · Get at least 30 minutes of exercise on most days of the week. Walking is a good choice. You also may want to do other activities, such as running, swimming, cycling, or playing tennis or team sports. · Do not smoke. Smoking can make health problems worse. If you need help quitting, talk to your doctor about stop-smoking programs and medicines. These can increase your chances of quitting for good. · Protect your skin from too much sun. When you're outdoors from 10 a.m. to 4 p.m., stay in the shade or cover up with clothing and a hat with a wide brim. Wear sunglasses that block UV rays. Even when it's cloudy, put broad-spectrum sunscreen (SPF 30 or higher) on any exposed skin. · See a dentist one or two times a year for checkups and to have your teeth cleaned. · Wear a seat belt in the car. · Limit alcohol to 1 drink a day. Too much alcohol can cause health problems. Follow your doctor's advice about when to have certain tests. These tests can spot problems early. · Cholesterol.  Your doctor will tell you how often to have this done based on your age, family history, or other things that can increase your risk for heart attack and stroke. · Blood pressure. Have your blood pressure checked during a routine doctor visit. Your doctor will tell you how often to check your blood pressure based on your age, your blood pressure results, and other factors. · Mammogram. Ask your doctor how often you should have a mammogram, which is an X-ray of your breasts. A mammogram can spot breast cancer before it can be felt and when it is easiest to treat. · Pap test and pelvic exam. Ask your doctor how often you should have a Pap test. You may not need to have a Pap test as often as you used to. · Vision. Have your eyes checked every year or two or as often as your doctor suggests. Some experts recommend that you have yearly exams for glaucoma and other age-related eye problems starting at age 48. · Hearing. Tell your doctor if you notice any change in your hearing. You can have tests to find out how well you hear. · Diabetes. Ask your doctor whether you should have tests for diabetes. · Colon cancer. You should begin tests for colon cancer at age 48. You may have one of several tests. Your doctor will tell you how often to have tests based on your age and risk. Risks include whether you already had a precancerous polyp removed from your colon or whether your parents, sisters and brothers, or children have had colon cancer. · Thyroid disease. Talk to your doctor about whether to have your thyroid checked as part of a regular physical exam. Women have an increased chance of a thyroid problem. · Osteoporosis. You should begin tests for bone density at age 72. If you are younger than 72, ask your doctor whether you have factors that may increase your risk for this disease. You may want to have this test before age 72. · Heart attack and stroke risk. At least every 4 to 6 years, you should have your risk for heart attack and stroke assessed.  Your doctor uses factors such as your age, blood pressure, cholesterol, and whether you smoke or have diabetes to show what your risk for a heart attack or stroke is over the next 10 years. When should you call for help? Watch closely for changes in your health, and be sure to contact your doctor if you have any problems or symptoms that concern you. Where can you learn more? Go to http://victor hugo-maureen.info/. Enter T163 in the search box to learn more about \"Well Visit, Women 50 to 72: Care Instructions. \" Current as of: March 28, 2018 Content Version: 11.9 © 6605-0070 [employer]. Care instructions adapted under license by [employer] (which disclaims liability or warranty for this information). If you have questions about a medical condition or this instruction, always ask your healthcare professional. [address] any warranty or liability for your use of this information.

## 2019-06-17 ENCOUNTER — TELEPHONE (OUTPATIENT)
Dept: INTERNAL MEDICINE CLINIC | Age: 64
End: 2019-06-17

## 2019-06-17 NOTE — TELEPHONE ENCOUNTER
wishes to have the nurse contact him when Dr Benny Goodman is accepting patients . -  Michelle Seth can be reached at [phone], patient's  was informed I wasn't sure when the office will be accepting patient's again and it's best she find a new PCP office

## 2020-05-05 ENCOUNTER — HOSPITAL ENCOUNTER (OUTPATIENT)
Dept: ULTRASOUND IMAGING | Age: 65
Discharge: HOME | End: 2020-05-05
Attending: FAMILY MEDICINE
Payer: COMMERCIAL

## 2020-05-05 DIAGNOSIS — R94.5 NONSPECIFIC ABNORMAL RESULTS OF LIVER FUNCTION STUDY: ICD-10-CM

## 2020-05-05 PROCEDURE — 76700 US EXAM ABDOM COMPLETE: CPT

## 2020-08-07 ENCOUNTER — OFFICE VISIT (OUTPATIENT)
Dept: INTERNAL MEDICINE CLINIC | Age: 65
End: 2020-08-07
Payer: COMMERCIAL

## 2020-08-07 VITALS
OXYGEN SATURATION: 96 % | HEART RATE: 72 BPM | BODY MASS INDEX: 28.17 KG/M2 | TEMPERATURE: 98.5 F | RESPIRATION RATE: 16 BRPM | HEIGHT: 63 IN | DIASTOLIC BLOOD PRESSURE: 69 MMHG | WEIGHT: 159 LBS | SYSTOLIC BLOOD PRESSURE: 110 MMHG

## 2020-08-07 DIAGNOSIS — I10 ESSENTIAL HYPERTENSION: Primary | ICD-10-CM

## 2020-08-07 DIAGNOSIS — R00.2 PALPITATIONS: ICD-10-CM

## 2020-08-07 DIAGNOSIS — G47.33 OSA (OBSTRUCTIVE SLEEP APNEA): ICD-10-CM

## 2020-08-07 DIAGNOSIS — R41.3 MEMORY PROBLEM: ICD-10-CM

## 2020-08-07 DIAGNOSIS — K75.81 NASH (NONALCOHOLIC STEATOHEPATITIS): ICD-10-CM

## 2020-08-07 DIAGNOSIS — F41.9 ANXIETY: ICD-10-CM

## 2020-08-07 DIAGNOSIS — E78.5 DYSLIPIDEMIA, GOAL LDL BELOW 100: ICD-10-CM

## 2020-08-07 PROCEDURE — 99214 OFFICE O/P EST MOD 30 MIN: CPT | Performed by: INTERNAL MEDICINE

## 2020-08-07 RX ORDER — ATORVASTATIN CALCIUM 20 MG/1
TABLET, FILM COATED ORAL
COMMUNITY
Start: 2020-05-19 | End: 2020-08-11

## 2020-08-07 RX ORDER — FLUOXETINE HYDROCHLORIDE 20 MG/1
20 CAPSULE ORAL DAILY
Qty: 30 CAP | Refills: 1 | Status: SHIPPED | OUTPATIENT
Start: 2020-08-07 | End: 2020-09-07

## 2020-08-07 RX ORDER — LOSARTAN POTASSIUM 50 MG/1
TABLET ORAL DAILY
COMMUNITY
End: 2021-04-01

## 2020-08-07 NOTE — PROGRESS NOTES
HISTORY OF PRESENT ILLNESS  Giovanni Arciniega is a 59 y.o. female. HPI  New to me -did see dr Angeles Arambula and recently dr Henry Slater  . htn-on meds no concerns-had stress test and  holter dr lopez  This year reports normal-some palpitations. Anxiety-on prozac 10 describes insomnia and using benadryl often -used ambien in past  Lumbar  Stenosis-did edie in past not helpful. Dyslipidemia-on lipitor no diffuse myalgias due to labs  Prophylactic bilat oophorectomy-sees dr Angel De for gyn care  Cholecystectomy  Colonoscopy in 2019 diverticulosis  [place] and enjoys 5yo and 10 yo grands and enjoys artwork and being retired      Review of Systems   Constitutional: Positive for malaise/fatigue and weight loss (10 pound intentionally). Negative for chills, diaphoresis and fever. Respiratory: Negative for cough, shortness of breath and wheezing. Cardiovascular: Negative for chest pain, palpitations, orthopnea, leg swelling and PND. Gastrointestinal: Negative for abdominal pain, constipation, diarrhea, heartburn, nausea and vomiting. Genitourinary: Negative for dysuria and urgency. Musculoskeletal: Positive for back pain and joint pain (right foot gets numb and  pain in cold). Negative for myalgias. Neurological: Negative for dizziness and headaches. Psychiatric/Behavioral: Negative for depression, substance abuse and suicidal ideas. The patient is nervous/anxious and has insomnia. All other systems reviewed and are negative. Physical Exam  Vitals signs and nursing note reviewed. Constitutional:       Appearance: She is well-developed. HENT:      Head: Normocephalic and atraumatic. Right Ear: Tympanic membrane normal.      Left Ear: Tympanic membrane normal.   Eyes:      Extraocular Movements: Extraocular movements intact. Pupils: Pupils are equal, round, and reactive to light. Neck:      Musculoskeletal: Normal range of motion and neck supple. Thyroid: No thyromegaly.       Vascular: No carotid bruit. Cardiovascular:      Rate and Rhythm: Normal rate and regular rhythm. Heart sounds: Normal heart sounds, S1 normal and S2 normal. No murmur. Pulmonary:      Effort: Pulmonary effort is normal. No respiratory distress. Breath sounds: Normal breath sounds. No wheezing or rales. Abdominal:      General: Bowel sounds are normal. There is no distension. Palpations: Abdomen is soft. There is no mass. Tenderness: There is no abdominal tenderness. There is no guarding. Musculoskeletal:      Right lower leg: No edema. Left lower leg: No edema. Lymphadenopathy:      Cervical: No cervical adenopathy. Skin:     General: Skin is warm and dry. Findings: No erythema. Neurological:      General: No focal deficit present. Mental Status: She is alert and oriented to person, place, and time. Psychiatric:         Mood and Affect: Mood normal.         Behavior: Behavior normal.         ASSESSMENT and PLAN  Diagnoses and all orders for this visit:    1. Essential hypertension  Stable on  Med  2. Dyslipidemia, goal LDL below 100-discussed if lft only mildly inc would not stop the lipitor given underlying colon  -     METABOLIC PANEL, COMPREHENSIVE; Future  -     LIPID PANEL; Future  -     TSH 3RD GENERATION; Future    3. Memory problem-seems ok now    4. JOLLY (obstructive sleep apnea)-reports mild and no need for cpap  -     CBC WITH AUTOMATED DIFF; Future    5. Anxiety-with iinsomnia-inc dose and appt in 1mo consider trazodone for sleep if still an issue  -     FLUoxetine (PROzac) 20 mg capsule; Take 1 Cap by mouth daily. 6. Palpitations-obtain reports from dr lopez    7.  COLON (nonalcoholic steatohepatitis)  Cont weight loss as she is doing

## 2020-08-11 RX ORDER — ATORVASTATIN CALCIUM 20 MG/1
TABLET, FILM COATED ORAL
Qty: 90 TAB | Refills: 1 | Status: SHIPPED | OUTPATIENT
Start: 2020-08-11 | End: 2020-09-07

## 2020-08-18 LAB
ALBUMIN SERPL-MCNC: 4.1 G/DL (ref 3.8–4.8)
ALBUMIN/GLOB SERPL: 1.4 {RATIO} (ref 1.2–2.2)
ALP SERPL-CCNC: 85 IU/L (ref 39–117)
ALT SERPL-CCNC: 29 IU/L (ref 0–32)
AST SERPL-CCNC: 19 IU/L (ref 0–40)
BASOPHILS # BLD AUTO: 0.1 X10E3/UL (ref 0–0.2)
BASOPHILS NFR BLD AUTO: 1 %
BILIRUB SERPL-MCNC: 0.9 MG/DL (ref 0–1.2)
BUN SERPL-MCNC: 12 MG/DL (ref 8–27)
BUN/CREAT SERPL: 15 (ref 12–28)
CALCIUM SERPL-MCNC: 9.4 MG/DL (ref 8.7–10.3)
CHLORIDE SERPL-SCNC: 102 MMOL/L (ref 96–106)
CHOLEST SERPL-MCNC: 151 MG/DL (ref 100–199)
CO2 SERPL-SCNC: 25 MMOL/L (ref 20–29)
CREAT SERPL-MCNC: 0.81 MG/DL (ref 0.57–1)
EOSINOPHIL # BLD AUTO: 0.2 X10E3/UL (ref 0–0.4)
EOSINOPHIL NFR BLD AUTO: 3 %
ERYTHROCYTE [DISTWIDTH] IN BLOOD BY AUTOMATED COUNT: 12.8 % (ref 11.7–15.4)
GLOBULIN SER CALC-MCNC: 3 G/DL (ref 1.5–4.5)
GLUCOSE SERPL-MCNC: 100 MG/DL (ref 65–99)
HCT VFR BLD AUTO: 44.2 % (ref 34–46.6)
HDLC SERPL-MCNC: 59 MG/DL
HGB BLD-MCNC: 15.1 G/DL (ref 11.1–15.9)
IMM GRANULOCYTES # BLD AUTO: 0 X10E3/UL (ref 0–0.1)
IMM GRANULOCYTES NFR BLD AUTO: 0 %
INTERPRETATION, 910389: NORMAL
LDLC SERPL CALC-MCNC: 75 MG/DL (ref 0–99)
LYMPHOCYTES # BLD AUTO: 2.7 X10E3/UL (ref 0.7–3.1)
LYMPHOCYTES NFR BLD AUTO: 40 %
MCH RBC QN AUTO: 30.6 PG (ref 26.6–33)
MCHC RBC AUTO-ENTMCNC: 34.2 G/DL (ref 31.5–35.7)
MCV RBC AUTO: 90 FL (ref 79–97)
MONOCYTES # BLD AUTO: 0.7 X10E3/UL (ref 0.1–0.9)
MONOCYTES NFR BLD AUTO: 11 %
NEUTROPHILS # BLD AUTO: 3.1 X10E3/UL (ref 1.4–7)
NEUTROPHILS NFR BLD AUTO: 45 %
PLATELET # BLD AUTO: 347 X10E3/UL (ref 150–450)
POTASSIUM SERPL-SCNC: 4.5 MMOL/L (ref 3.5–5.2)
PROT SERPL-MCNC: 7.1 G/DL (ref 6–8.5)
RBC # BLD AUTO: 4.94 X10E6/UL (ref 3.77–5.28)
SODIUM SERPL-SCNC: 140 MMOL/L (ref 134–144)
TRIGL SERPL-MCNC: 84 MG/DL (ref 0–149)
TSH SERPL DL<=0.005 MIU/L-ACNC: 1.01 UIU/ML (ref 0.45–4.5)
VLDLC SERPL CALC-MCNC: 17 MG/DL (ref 5–40)
WBC # BLD AUTO: 6.8 X10E3/UL (ref 3.4–10.8)

## 2020-09-07 DIAGNOSIS — F41.9 ANXIETY: ICD-10-CM

## 2020-09-07 RX ORDER — FLUOXETINE HYDROCHLORIDE 20 MG/1
CAPSULE ORAL
Qty: 30 CAP | Refills: 1 | Status: SHIPPED | OUTPATIENT
Start: 2020-09-07 | End: 2020-11-09 | Stop reason: SDUPTHER

## 2020-09-07 RX ORDER — ATORVASTATIN CALCIUM 20 MG/1
TABLET, FILM COATED ORAL
Qty: 90 TAB | Refills: 1 | Status: SHIPPED | OUTPATIENT
Start: 2020-09-07 | End: 2021-03-23

## 2020-09-16 ENCOUNTER — VIRTUAL VISIT (OUTPATIENT)
Dept: INTERNAL MEDICINE CLINIC | Age: 65
End: 2020-09-16
Payer: COMMERCIAL

## 2020-09-16 DIAGNOSIS — I10 ESSENTIAL HYPERTENSION: ICD-10-CM

## 2020-09-16 DIAGNOSIS — I47.20 V TACH: ICD-10-CM

## 2020-09-16 DIAGNOSIS — F41.9 ANXIETY: Primary | ICD-10-CM

## 2020-09-16 PROCEDURE — 99213 OFFICE O/P EST LOW 20 MIN: CPT | Performed by: INTERNAL MEDICINE

## 2020-09-16 NOTE — PROGRESS NOTES
Consent: Lemuel Head, who was seen by synchronous (real-time) audio-video technology, and/or her healthcare decision maker, is aware that this patient-initiated, Telehealth encounter on 9/16/2020 is a billable service, with coverage as determined by her insurance carrier. She is aware that she may receive a bill and has provided verbal consent to proceed: YES  712  Subjective:   Lemuel Head is a 59 y.o. female who was seen for Medication Evaluation      Eugenianicky Sher is seen for med check. Issues:  1. Anxiety. We had bumped her Fluoxetine dose. She feels that it is helpful and she has had on side effects on increased dose. 2. Palpitations. Did have a Holter done in July with Dr. Inocente Martinez. This showed 28 PVCs with one episode of V-tach, longest duration was six beats. Minimum rate was 48, maximum rate was 123. No significant pauses. Dr. Melquiades Ortiz had suggested that he could give her medication, but that it was okay to monitor over time. Discussed this in detail. She does report one episode of palpitations with feeling lightheaded or presyncopal and I have suggested that this is indication to talk again with cardiology because of the presence of V-tach. Did also discuss that potentially we might need to change the Fluoxetine. Will not do this now as it does seem to be helping with anxiety. Current Outpatient Medications   Medication Sig    atorvastatin (LIPITOR) 20 mg tablet TAKE 1 TABLET DAILY    FLUoxetine (PROzac) 20 mg capsule TAKE 1 CAPSULE DAILY.  losartan (COZAAR) 50 mg tablet Take  by mouth daily.  melatonin 5 mg cap capsule Take 10 mg by mouth nightly.  ibuprofen (MOTRIN) 400 mg tablet Take  by mouth every six (6) hours as needed for Pain.  DIPHENHYDRAMINE HCL (BENADRYL PO) Take  by mouth as needed. No current facility-administered medications for this visit.         Allergies   Allergen Reactions    Apple Angioedema     Raw apples cause lip/eye to swelling  Peaches causes lip/eye swelling        Past Medical History:   Diagnosis Date    Abnormal mammogram of left breast 03/09/2017    left breast mass and poss nipple inversion- needs add views    Fatty liver     GERD (gastroesophageal reflux disease)     H/O diagnostic mammography 03/22/2017    Left breast dx mammogram & ultrasound- benign cysts    H/O mammogram 06/06/2019    normal - dense breast    Headache(784.0)     migraines    History of mammogram 10/7/15; 3/12/18    Negative; Normal    Hx of biopsy 11/18/13    vulvar bx: benign    Hypercholesterolemia     Hypertension     BETTS (nonalcoholic steatohepatitis) 8/7/2020    abd us 5/20    Other ill-defined conditions(799.89)     hx vagal responces     Palpitations 8/7/2020    Stress test and holter dr Oanh Perkins 2020 normal    Pap smear for cervical cancer screening 03/09/2017    neg, hpv neg    Premature ventricular beat     Tubulovillous adenoma 2007    Unspecified sleep apnea     NO CPAP       ROS  All other systems reviewed and negative, unless mentioned in HPI    Objective:   Vital Signs: (As obtained by patient/caregiver at home)  There were no vitals taken for this visit.      [INSTRUCTIONS:  \"[x]\" Indicates a positive item  \"[]\" Indicates a negative item  -- DELETE ALL ITEMS NOT EXAMINED]    Constitutional: [x] Appears well-developed and well-nourished [x] No apparent distress      [] Abnormal -     Mental status: [x] Alert and awake  [x] Oriented to person/place/time [x] Able to follow commands    [] Abnormal -     Eyes:   EOM    [x]  Normal    [] Abnormal -   Sclera  [x]  Normal    [] Abnormal -          Discharge [x]  None visible   [] Abnormal -     HENT: [x] Normocephalic, atraumatic  [] Abnormal -       External Ears [x] Normal  [] Abnormal -    Neck: [x] No visualized mass [] Abnormal -     Pulmonary/Chest: [x] Respiratory effort normal   [x] No visualized signs of difficulty breathing or respiratory distress        [] Abnormal - Musculoskeletal:            [x] Normal range of motion of neck        [] Abnormal -     Neurological:        [x] No Facial Asymmetry (Cranial nerve 7 motor function) (limited exam due to video visit)          [x] No gaze palsy        [] Abnormal -          Skin:        [x] No significant exanthematous lesions or discoloration noted on facial skin         [] Abnormal -            Psychiatric:       [x] Normal Affect [] Abnormal -           Other pertinent observable physical exam findings:-        Assessment & Plan:   Diagnoses and all orders for this visit:    1. Anxiety-better with prozac 20 mg will not change dose    2. V tach (HCC)-1 brief episode but does report sxs of feeling presyncopal=-encouraged follow up with dr Monk Ringer  Her cardiologist    3. Essential hypertension            We discussed the expected course, resolution and complications of the diagnosis(es) in detail. Medication risks, benefits, costs, interactions, and alternatives were discussed as indicated. I advised her to contact the office if her condition worsens, changes or fails to improve as anticipated. She expressed understanding with the diagnosis(es) and plan. Jennifer Berg is a 59 y.o. female being evaluated by a video visit encounter for concerns as above. A caregiver was present when appropriate. Due to this being a TeleHealth encounter (During [employer]-15 public health emergency), evaluation of the following organ systems was limited: Vitals/Constitutional/EENT/Resp/CV/GI//MS/Neuro/Skin/Heme-Lymph-Imm. Pursuant to the emergency declaration under the [facility], 1135 waiver authority and the [employer] Act, this Virtual  Visit was conducted, with patient's (and/or legal guardian's) consent, to reduce the patient's risk of exposure to COVID-19 and provide necessary medical care.      Services were provided through a video synchronous discussion virtually to substitute for in-person clinic visit. Patient and provider were located at their individual homes.

## 2020-10-12 NOTE — PROGRESS NOTES
[facility]  [email or web address]  [phone]    Kenton Yoon MD, [address]       Annual Gynecologic Exam:   WWE 60+  No chief complaint on file. Rosie Magallon is a 59 y.o. No obstetric history on file. WHITE OR   female who presents for an annual exam.    She does not report additional concerns today. Sexual history and Contraception:  Social History     Substance and Sexual Activity   Sexual Activity Yes    Partners: Male     She does not reports new sexual partner(s) in the last year. Preventive Medicine History:  Her most recent Pap smear result: normal was obtained in March 2017  Her most recent HR HPV screen was Negative obtained in 2017    She does not have a history of GALILEA 2, 3 or cervical cancer. Breast health:  Last mammogram: was normal.   A mammogram was scheduled for today. Breast cancer family updated: see FH. Bone health: Brian Sullivan She has had a bone density scan in the past.   Last bone density test results: abnormal findings 10/11/18  She does have a history of osteopenia/osteoporosis. Osteoporosis family history updated: see .      Past Medical History:   Diagnosis Date    Abnormal mammogram of left breast 03/09/2017    left breast mass and poss nipple inversion- needs add views    Fatty liver     GERD (gastroesophageal reflux disease)     H/O diagnostic mammography 03/22/2017    Left breast dx mammogram & ultrasound- benign cysts    H/O mammogram 06/06/2019    normal - dense breast    Headache(784.0)     migraines    History of mammogram 10/7/15; 3/12/18    Negative; Normal    Hx of biopsy 11/18/13    vulvar bx: benign    Hypercholesterolemia     Hypertension     BETTS (nonalcoholic steatohepatitis) 8/7/2020    abd us 5/20    Other ill-defined conditions(799.89)     hx vagal responces     Palpitations 8/7/2020    Stress test and holter dr lopez 2020 normal    Pap smear for cervical cancer screening 03/09/2017    neg, hpv neg    Premature ventricular beat     Tubulovillous adenoma 2007    Unspecified sleep apnea     NO CPAP     Past Surgical History:   Procedure Laterality Date    ENDOSCOPY, COLON, DIAGNOSTIC  8/2010    HX BILATERAL SALPINGO-OOPHORECTOMY  1/8/14    benign    HX CATARACT REMOVAL      bilateral    HX CHOLECYSTECTOMY      HX LAP CHOLECYSTECTOMY      CAT Pace.     HX OTHER SURGICAL      Cataract removal - 2001    HX OTHER SURGICAL  2011    EPIDURAL STEROID INJ LOW BACK    HX SALPINGO-OOPHORECTOMY      prophylactic due to family hx ovarian ca     Family History   Problem Relation Age of Onset    Ovarian Cancer Mother     Cancer Mother         ovarian    Dementia Father     Other Father         Insomnia    Diabetes Maternal Grandmother     Cancer Paternal Grandmother         colon    Parkinson's Disease Paternal Grandfather     Diabetes Other     Heart Disease Neg Hx     Hypertension Neg Hx      Social History     Socioeconomic History    Marital status:      Spouse name: Not on file    Number of children: Not on file    Years of education: Not on file    Highest education level: Not on file   Occupational History    Not on file   Social Needs    Financial resource strain: Not on file    Food insecurity     Worry: Not on file     Inability: Not on file    Transportation needs     Medical: Not on file     Non-medical: Not on file   Tobacco Use    Smoking status: Never Smoker    Smokeless tobacco: Never Used   Substance and Sexual Activity    Alcohol use:  Yes     Alcohol/week: 0.0 standard drinks     Comment: 2 DRINKS PER MONTH    Drug use: No    Sexual activity: Yes     Partners: Male   Lifestyle    Physical activity     Days per week: Not on file     Minutes per session: Not on file    Stress: Not on file   Relationships    Social connections     Talks on phone: Not on file     Gets together: Not on file     Attends Mosque service: Not on file     Active member of club or organization: Not on file     Attends meetings of clubs or organizations: Not on file     Relationship status: Not on file    Intimate partner violence     Fear of current or ex partner: Not on file     Emotionally abused: Not on file     Physically abused: Not on file     Forced sexual activity: Not on file   Other Topics Concern    Not on file   Social History Narrative    Not on file       Allergies   Allergen Reactions    Apple Angioedema     Raw apples cause lip/eye to swelling  Peaches causes lip/eye swelling        Current Outpatient Medications   Medication Sig    atorvastatin (LIPITOR) 20 mg tablet TAKE 1 TABLET DAILY    FLUoxetine (PROzac) 20 mg capsule TAKE 1 CAPSULE DAILY.  losartan (COZAAR) 50 mg tablet Take  by mouth daily.  melatonin 5 mg cap capsule Take 10 mg by mouth nightly.  ibuprofen (MOTRIN) 400 mg tablet Take  by mouth every six (6) hours as needed for Pain.  DIPHENHYDRAMINE HCL (BENADRYL PO) Take  by mouth as needed. No current facility-administered medications for this visit. Patient Active Problem List   Diagnosis Code    Overweight (BMI 25.0-29. 9) E66.3    Other and unspecified hyperlipidemia E78.5    HTN (hypertension) I10    JOLLY (obstructive sleep apnea) G47.33    Anxiety F41.9    FH: ovarian cancer Z80.41    UMA (stress urinary incontinence, female) N39.3    Gastroesophageal reflux disease without esophagitis K21.9    Palpitations R00.2    BETTS (nonalcoholic steatohepatitis) K75.81       Review of Systems - History obtained from the patient  Constitutional: negative for weight loss, fever, night sweats  HEENT: negative for hearing loss, earache, congestion, snoring, sorethroat  CV: negative for chest pain, palpitations, edema  Resp: negative for cough, shortness of breath, wheezing  GI: negative for change in bowel habits, abdominal pain, black or bloody stools  : negative for frequency, dysuria, hematuria, vaginal discharge  MSK: negative for back pain, joint pain, muscle pain  Breast: negative for breast lumps, nipple discharge, galactorrhea  Skin :negative for itching, rash, hives  Neuro: negative for dizziness, headache, confusion, weakness  Psych: negative for anxiety, depression, change in mood  Heme/lymph: negative for bleeding, bruising, pallor    (WWE continued)    Physical Exam  There were no vitals taken for this visit.     Constitutional  · Appearance: well-nourished, well developed, alert, in no acute distress    HENT  · Head and Face: appears normal    Neck  · Inspection/Palpation: normal appearance, no masses or tenderness  · Lymph Nodes: no lymphadenopathy present  · Thyroid: gland size normal, nontender, no nodules or masses present on palpation    Chest  · Respiratory Effort: breathing unlabored  · Auscultation: normal breath sounds    Cardiovascular  · Heart:  · Auscultation: regular rate and rhythm without murmur    Breasts  · Inspection of Breasts: breasts symmetrical, no skin changes, no discharge present, nipple appearance normal, no skin retraction present  · Palpation of Breasts and Axillae: no masses present on palpation, no breast tenderness  · Axillary Lymph Nodes: no lymphadenopathy present    Gastrointestinal  · Abdominal Examination: abdomen non-tender to palpation, normal bowel sounds, no masses present  · Liver and spleen: no hepatomegaly present, spleen not palpable  · Hernias: no hernias identified    Genitourinary  · External Genitalia: normal appearance for age, no discharge present, no tenderness present, no inflammatory lesions present, no masses present, with atrophy present  · Vagina: normal vaginal vault without central or paravaginal defects, no discharge present, no inflammatory lesions present, no masses present  · Bladder: non-tender to palpation  · Urethra: appears normal  · Cervix: normal   · Uterus: normal size, shape and consistency  · Adnexa: no adnexal tenderness present, no adnexal masses present  · Perineum: perineum within normal limits, no evidence of trauma, no rashes or skin lesions present  · Anus: anus within normal limits, no hemorrhoids present  · Inguinal Lymph Nodes: no lymphadenopathy present    Skin  · General Inspection: no rash, no lesions identified    Neurologic/Psychiatric  · Mental Status:  · Orientation: grossly oriented to person, place and time  · Mood and Affect: mood normal, affect appropriate    Assessment:  59 y.o. No obstetric history on file. for well woman exam  Encounter Diagnoses   Name Primary?  Osteopenia, unspecified location Yes    Well female exam with routine gynecological exam     Encounter for screening for human papillomavirus (HPV)        Plan:  The patient was counseled about diet, exercise, healthy lifestyle  We discussed current self breast exam and mammogram recommendations  We discussed current pap smear and HR HPV testing guidelines  We recommend follow up in one year for routine annual gynecologic exam or sooner if needed  We recommend follow up with a primary care physician for any chronic medical problems or non-gynecologic concerns    We discussed calcium/vitamin D/weight bearing exercise and osteoporosis prevention and bone density screening recommendations. Ho given  Repeat BMD  Handouts were given to the patient       Folllow up:  [x] return for annual well woman exam in one year or sooner if she is having problems  [] follow up and ultrasound  [] mammogram  [] 6 months  [] 6 weeks   []     Orders Placed This Encounter    DEXA BONE DENSITY STUDY AXIAL    PAP IG, HPV AND RFX HPV 97/12,74([id number])       Results for orders placed or performed during the hospital encounter of 10/13/20   ZAIN 3D CHRISTELLE W MAMMO BI SCREENING INCL CAD    Narrative    STUDY:  Bilateral Digital Screening 3D breast tomosynthesis is performed and  interpreted in conjunction with CAD. INDICATION:  Screening.     Direct comparison is made to multiple prior mammograms. BREAST COMPOSITION: There are scattered fibroglandular densities (approximately  25-50% glandular). FINDINGS: The breast parenchyma is stable bilaterally. No new suspicious masses  or calcifications are identified. There is no skin thickening or nipple  retraction. There has been no significant change. Impression    IMPRESSION: Birads 2. Benign. No mammographic evidence of malignancy. Next screening mammogram is recommended in one year. The patient will be  notified of these results.

## 2020-10-13 ENCOUNTER — OFFICE VISIT (OUTPATIENT)
Dept: OBGYN CLINIC | Age: 65
End: 2020-10-13
Payer: COMMERCIAL

## 2020-10-13 DIAGNOSIS — M85.80 OSTEOPENIA, UNSPECIFIED LOCATION: Primary | ICD-10-CM

## 2020-10-13 DIAGNOSIS — Z11.51 ENCOUNTER FOR SCREENING FOR HUMAN PAPILLOMAVIRUS (HPV): ICD-10-CM

## 2020-10-13 DIAGNOSIS — Z01.419 WELL FEMALE EXAM WITH ROUTINE GYNECOLOGICAL EXAM: ICD-10-CM

## 2020-10-13 PROCEDURE — 99396 PREV VISIT EST AGE 40-64: CPT | Performed by: OBSTETRICS & GYNECOLOGY

## 2020-10-13 NOTE — PATIENT INSTRUCTIONS
Well Visit, Women 48 to 72: Care Instructions Your Care Instructions Physical exams can help you stay healthy. Your doctor has checked your overall health and may have suggested ways to take good care of yourself. He or she also may have recommended tests. At home, you can help prevent illness with healthy eating, regular exercise, and other steps. Follow-up care is a key part of your treatment and safety. Be sure to make and go to all appointments, and call your doctor if you are having problems. It's also a good idea to know your test results and keep a list of the medicines you take. How can you care for yourself at home? · Reach and stay at a healthy weight. This will lower your risk for many problems, such as obesity, diabetes, heart disease, and high blood pressure. · Get at least 30 minutes of exercise on most days of the week. Walking is a good choice. You also may want to do other activities, such as running, swimming, cycling, or playing tennis or team sports. · Do not smoke. Smoking can make health problems worse. If you need help quitting, talk to your doctor about stop-smoking programs and medicines. These can increase your chances of quitting for good. · Protect your skin from too much sun. When you're outdoors from 10 a.m. to 4 p.m., stay in the shade or cover up with clothing and a hat with a wide brim. Wear sunglasses that block UV rays. Even when it's cloudy, put broad-spectrum sunscreen (SPF 30 or higher) on any exposed skin. · See a dentist one or two times a year for checkups and to have your teeth cleaned. · Wear a seat belt in the car. Follow your doctor's advice about when to have certain tests. These tests can spot problems early. · Cholesterol. Your doctor will tell you how often to have this done based on your age, family history, or other things that can increase your risk for heart attack and stroke. · Blood pressure. Have your blood pressure checked during a routine doctor visit. Your doctor will tell you how often to check your blood pressure based on your age, your blood pressure results, and other factors. · Mammogram. Ask your doctor how often you should have a mammogram, which is an X-ray of your breasts. A mammogram can spot breast cancer before it can be felt and when it is easiest to treat. · Pap test and pelvic exam. Ask your doctor how often you should have a Pap test. You may not need to have a Pap test as often as you used to. · Vision. Have your eyes checked every year or two or as often as your doctor suggests. Some experts recommend that you have yearly exams for glaucoma and other age-related eye problems starting at age 48. · Hearing. Tell your doctor if you notice any change in your hearing. You can have tests to find out how well you hear. · Diabetes. Ask your doctor whether you should have tests for diabetes. · Colorectal cancer. Your risk for colorectal cancer gets higher as you get older. Some experts say that adults should start regular screening at age 48 and stop at age 76. Others say to start before age 48 or continue after age 76. Talk with your doctor about your risk and when to start and stop screening. · Thyroid disease. Talk to your doctor about whether to have your thyroid checked as part of a regular physical exam. Women have an increased chance of a thyroid problem. · Osteoporosis. You should begin tests for bone density at age 72. If you are younger than 72, ask your doctor whether you have factors that may increase your risk for this disease. You may want to have this test before age 72. · Heart attack and stroke risk. At least every 4 to 6 years, you should have your risk for heart attack and stroke assessed.  Your doctor uses factors such as your age, blood pressure, cholesterol, and whether you smoke or have diabetes to show what your risk for a heart attack or stroke is over the next 10 years. When should you call for help? Watch closely for changes in your health, and be sure to contact your doctor if you have any problems or symptoms that concern you. Where can you learn more? Go to http://www.gray.com/ Enter E685 in the search box to learn more about \"Well Visit, Women 50 to 72: Care Instructions. \" Current as of: May 27, 2020               Content Version: 12.6 © 5352-0516 [employer]. Care instructions adapted under license by [employer] (which disclaims liability or warranty for this information). If you have questions about a medical condition or this instruction, always ask your healthcare professional. [address] any warranty or liability for your use of this information.

## 2020-10-21 LAB
CYTOLOGIST CVX/VAG CYTO: NORMAL
CYTOLOGY CVX/VAG DOC CYTO: NORMAL
CYTOLOGY CVX/VAG DOC THIN PREP: NORMAL
CYTOLOGY HISTORY:: NORMAL
DX ICD CODE: NORMAL
HPV I/H RISK 1 DNA CVX QL PROBE+SIG AMP: NORMAL
HPV I/H RISK 4 DNA CVX QL PROBE+SIG AMP: NEGATIVE
Lab: NORMAL
OTHER STN SPEC: NORMAL
STAT OF ADQ CVX/VAG CYTO-IMP: NORMAL

## 2020-11-09 DIAGNOSIS — F41.9 ANXIETY: ICD-10-CM

## 2020-11-09 RX ORDER — FLUOXETINE HYDROCHLORIDE 20 MG/1
20 CAPSULE ORAL DAILY
Qty: 90 CAP | Refills: 1 | Status: SHIPPED | OUTPATIENT
Start: 2020-11-09 | End: 2021-02-05

## 2021-01-18 ENCOUNTER — PATIENT MESSAGE (OUTPATIENT)
Dept: INTERNAL MEDICINE CLINIC | Age: 66
End: 2021-01-18

## 2021-01-20 ENCOUNTER — VIRTUAL VISIT (OUTPATIENT)
Dept: INTERNAL MEDICINE CLINIC | Age: 66
End: 2021-01-20
Payer: MEDICARE

## 2021-01-20 DIAGNOSIS — F41.9 ANXIETY AND DEPRESSION: ICD-10-CM

## 2021-01-20 DIAGNOSIS — F32.A ANXIETY AND DEPRESSION: ICD-10-CM

## 2021-01-20 DIAGNOSIS — F51.02 ADJUSTMENT INSOMNIA: Primary | ICD-10-CM

## 2021-01-20 PROCEDURE — 99214 OFFICE O/P EST MOD 30 MIN: CPT | Performed by: INTERNAL MEDICINE

## 2021-01-20 RX ORDER — TRAZODONE HYDROCHLORIDE 50 MG/1
50 TABLET ORAL
Qty: 30 TAB | Refills: 0 | Status: SHIPPED | OUTPATIENT
Start: 2021-01-20 | End: 2021-02-02 | Stop reason: ALTCHOICE

## 2021-01-20 NOTE — PROGRESS NOTES
Consent: Tata Mattson, who was seen by synchronous (real-time) audio-video technology, and/or her healthcare decision maker, is aware that this patient-initiated, Telehealth encounter on 1/20/2021 is a billable service, with coverage as determined by her insurance carrier. She is aware that she may receive a bill and has provided verbal consent to proceed: YES  712  Subjective:   Tata Mattson is a 72 y.o. female who was seen for Depression (follow up)      In August we increased her Fluoxetine from 10 to 20 mg. She notes back then she was sleeping fine, however in the last couple months she is really having trouble falling asleep and sleep is disrupted. Will fall asleep at 4:00 and sleep till 11:00. Denies anxiety in the day. Does note she is not always as motivated and that the winter is a tough time on her. Denies substance issues or side effects from Fluoxetine. Current Outpatient Medications   Medication Sig    traZODone (DESYREL) 50 mg tablet Take 1 Tab by mouth nightly.  FLUoxetine (PROzac) 20 mg capsule Take 1 Cap by mouth daily.  atorvastatin (LIPITOR) 20 mg tablet TAKE 1 TABLET DAILY    losartan (COZAAR) 50 mg tablet Take  by mouth daily.  melatonin 5 mg cap capsule Take 10 mg by mouth nightly.  ibuprofen (MOTRIN) 400 mg tablet Take  by mouth every six (6) hours as needed for Pain.  DIPHENHYDRAMINE HCL (BENADRYL PO) Take  by mouth as needed. No current facility-administered medications for this visit.         Allergies   Allergen Reactions    Apple Angioedema     Raw apples cause lip/eye to swelling  Peaches causes lip/eye swelling        Past Medical History:   Diagnosis Date    Abnormal mammogram of left breast 03/09/2017    left breast mass and poss nipple inversion- needs add views    Fatty liver     GERD (gastroesophageal reflux disease)     H/O diagnostic mammography 03/22/2017    Left breast dx mammogram & ultrasound- benign cysts    H/O mammogram 10/13/2020    Birads 2. Benign.  Headache(784.0)     migraines    History of mammogram 10/7/15; 3/12/18    Negative; Normal    Hx of biopsy 11/18/13    vulvar bx: benign    Hypercholesterolemia     Hypertension     BETTS (nonalcoholic steatohepatitis) 8/7/2020    abd us 5/20    Other ill-defined conditions(799.89)     hx vagal responces     Palpitations 8/7/2020    Stress test and holter dr Kan Certain 2020 normal    Pap smear for cervical cancer screening 03/09/2017; 10/13/20    neg, hpv neg    Premature ventricular beat     Tubulovillous adenoma 2007    Unspecified sleep apnea     NO CPAP       ROS  All other systems reviewed and negative, unless mentioned in HPI    Objective:   Vital Signs: (As obtained by patient/caregiver at home)  There were no vitals taken for this visit.      [INSTRUCTIONS:  \"[x]\" Indicates a positive item  \"[]\" Indicates a negative item  -- DELETE ALL ITEMS NOT EXAMINED]    Constitutional: [x] Appears well-developed and well-nourished [x] No apparent distress      [] Abnormal -     Mental status: [x] Alert and awake  [x] Oriented to person/place/time [x] Able to follow commands    [] Abnormal -     Eyes:   EOM    [x]  Normal    [] Abnormal -   Sclera  [x]  Normal    [] Abnormal -          Discharge [x]  None visible   [] Abnormal -     HENT: [x] Normocephalic, atraumatic  [] Abnormal -       External Ears [x] Normal  [] Abnormal -    Neck: [x] No visualized mass [] Abnormal -     Pulmonary/Chest: [x] Respiratory effort normal   [x] No visualized signs of difficulty breathing or respiratory distress        [] Abnormal -      Musculoskeletal:            [x] Normal range of motion of neck        [] Abnormal -     Neurological:        [x] No Facial Asymmetry (Cranial nerve 7 motor function) (limited exam due to video visit)          [x] No gaze palsy        [] Abnormal -          Skin:        [x] No significant exanthematous lesions or discoloration noted on facial skin         [] Abnormal -            Psychiatric:       [x] Normal Affect [] Abnormal -           Other pertinent observable physical exam findings:-        Assessment & Plan:   Diagnoses and all orders for this visit:    1. Adjustment insomnia  -     traZODone (DESYREL) 50 mg tablet; Take 1 Tab by mouth nightly. 2. Anxiety and depression    Add trazodone  Side effects possible reviewed in detail  appt in 1mo        We discussed the expected course, resolution and complications of the diagnosis(es) in detail. Medication risks, benefits, costs, interactions, and alternatives were discussed as indicated. I advised her to contact the office if her condition worsens, changes or fails to improve as anticipated. She expressed understanding with the diagnosis(es) and plan. Kushal Vidal is a 72 y.o. female being evaluated by a video visit encounter for concerns as above. A caregiver was present when appropriate. Due to this being a TeleHealth encounter (During YPSDX-69 public health emergency), evaluation of the following organ systems was limited: Vitals/Constitutional/EENT/Resp/CV/GI//MS/Neuro/Skin/Heme-Lymph-Imm. Pursuant to the emergency declaration under the [facility], 1135 waiver authority and the [employer] and [employer] Act, this Virtual  Visit was conducted, with patient's (and/or legal guardian's) consent, to reduce the patient's risk of exposure to COVID-19 and provide necessary medical care. Services were provided through a video synchronous discussion virtually to substitute for in-person clinic visit. Patient and provider were located at their individual homes.

## 2021-01-20 NOTE — PROGRESS NOTES
1. Have you been to the ER, urgent care clinic, or been hospitalized since your last visit? No     2. Have you seen or consulted any other health care providers outside of the [address] [place] [address] since your last visit?   No     Reviewed record in preparation for visit and have necessary documentation  Goals that were addressed and/or need to be completed during or after this appointment include   Health Maintenance Due   Topic Date Due    Hepatitis C Screening  1955    COVID-19 Vaccine (1 of 2) 12/01/1971    Shingrix Vaccine Age 50> (1 of 2) 12/01/2005    Colorectal Cancer Screening Combo  12/01/2005    Flu Vaccine (1) 09/01/2020    GLAUCOMA SCREENING Q2Y  12/01/2020    Pneumococcal 65+ years (1 of 1 - PPSV23) 12/01/2020

## 2021-02-02 ENCOUNTER — TELEPHONE (OUTPATIENT)
Dept: INTERNAL MEDICINE CLINIC | Age: 66
End: 2021-02-02

## 2021-02-02 ENCOUNTER — VIRTUAL VISIT (OUTPATIENT)
Dept: INTERNAL MEDICINE CLINIC | Age: 66
End: 2021-02-02
Payer: MEDICARE

## 2021-02-02 DIAGNOSIS — F41.9 ANXIETY: ICD-10-CM

## 2021-02-02 DIAGNOSIS — R45.4 IRRITABILITY: ICD-10-CM

## 2021-02-02 DIAGNOSIS — F51.02 ADJUSTMENT INSOMNIA: ICD-10-CM

## 2021-02-02 DIAGNOSIS — F41.9 ANXIETY: Primary | ICD-10-CM

## 2021-02-02 PROCEDURE — 99213 OFFICE O/P EST LOW 20 MIN: CPT | Performed by: INTERNAL MEDICINE

## 2021-02-02 NOTE — PROGRESS NOTES
Consent: Cassandra Collazo, who was seen by synchronous (real-time) audio-video technology, and/or her healthcare decision maker, is aware that this patient-initiated, Telehealth encounter on 2/2/2021 is a billable service, with coverage as determined by her insurance carrier. She is aware that she may receive a bill and has provided verbal consent to proceed: YES  712  Subjective:   Cassandra Collazo is a 72 y.o. female who was seen for Medication Evaluation, Insomnia, and Anxiety      She was earlier having symptoms of anxiety, which seemed to improve with increasing Fluoxetine from10 to 20, but then was having trouble with early morning wakening, so we tried Trazodone 50. She notes that this helps her sleep, however she is now feeling more irritable, feels that she is possibly a bit confused. It is not specific to morning time.  notes she has been irritable in the last week. She does not feel that she is generally a depressive person and tends more towards anxiety. Living near [place] and would like to move closer to town. No substance issues. Worried that the Trazodone may be causing her irritability. Current Outpatient Medications   Medication Sig    FLUoxetine (PROzac) 20 mg capsule Take 1 Cap by mouth daily.  atorvastatin (LIPITOR) 20 mg tablet TAKE 1 TABLET DAILY    losartan (COZAAR) 50 mg tablet Take  by mouth daily.  ibuprofen (MOTRIN) 400 mg tablet Take  by mouth every six (6) hours as needed for Pain.  melatonin 5 mg cap capsule Take 10 mg by mouth nightly. No current facility-administered medications for this visit.         Allergies   Allergen Reactions    Apple Angioedema     Raw apples cause lip/eye to swelling  Peaches causes lip/eye swelling        Past Medical History:   Diagnosis Date    Abnormal mammogram of left breast 03/09/2017    left breast mass and poss nipple inversion- needs add views    Fatty liver     GERD (gastroesophageal reflux disease)     H/O diagnostic mammography 03/22/2017    Left breast dx mammogram & ultrasound- benign cysts    H/O mammogram 10/13/2020    Birads 2. Benign.  Headache(784.0)     migraines    History of mammogram 10/7/15; 3/12/18    Negative; Normal    Hx of biopsy 11/18/13    vulvar bx: benign    Hypercholesterolemia     Hypertension     BETTS (nonalcoholic steatohepatitis) 8/7/2020    abd us 5/20    Other ill-defined conditions(799.89)     hx vagal responces     Palpitations 8/7/2020    Stress test and holter dr Sheilla Dakins 2020 normal    Pap smear for cervical cancer screening 03/09/2017; 10/13/20    neg, hpv neg    Premature ventricular beat     Tubulovillous adenoma 2007    Unspecified sleep apnea     NO CPAP       ROS  All other systems reviewed and negative, unless mentioned in HPI    Objective:   Vital Signs: (As obtained by patient/caregiver at home)  There were no vitals taken for this visit.      [INSTRUCTIONS:  \"[x]\" Indicates a positive item  \"[]\" Indicates a negative item  -- DELETE ALL ITEMS NOT EXAMINED]    Constitutional: [x] Appears well-developed and well-nourished [x] No apparent distress      [] Abnormal -     Mental status: [x] Alert and awake  [x] Oriented to person/place/time [x] Able to follow commands    [] Abnormal -     Eyes:   EOM    [x]  Normal    [] Abnormal -   Sclera  [x]  Normal    [] Abnormal -          Discharge [x]  None visible   [] Abnormal -     HENT: [x] Normocephalic, atraumatic  [] Abnormal -       External Ears [x] Normal  [] Abnormal -    Neck: [x] No visualized mass [] Abnormal -     Pulmonary/Chest: [x] Respiratory effort normal   [x] No visualized signs of difficulty breathing or respiratory distress        [] Abnormal -      Musculoskeletal:            [x] Normal range of motion of neck        [] Abnormal -     Neurological:        [x] No Facial Asymmetry (Cranial nerve 7 motor function) (limited exam due to video visit)          [x] No gaze palsy        [] Abnormal - Skin:        [x] No significant exanthematous lesions or discoloration noted on facial skin         [] Abnormal -            Psychiatric:       [x] Normal Affect [] Abnormal -           Other pertinent observable physical exam findings:-        Assessment & Plan:   Diagnoses and all orders for this visit:    1. Anxiety    2. Adjustment insomnia    3. Irritability    I worry that the irritability is from mood disorder rather than side effect of trazodone but certainly want to be sure so will hold trazodone for 10 days and then re-evaluate -if still having trouble will change prozac to snri          We discussed the expected course, resolution and complications of the diagnosis(es) in detail. Medication risks, benefits, costs, interactions, and alternatives were discussed as indicated. I advised her to contact the office if her condition worsens, changes or fails to improve as anticipated. She expressed understanding with the diagnosis(es) and plan. Vianey Farris is a 72 y.o. female being evaluated by a video visit encounter for concerns as above. A caregiver was present when appropriate. Due to this being a TeleHealth encounter (During FLEVL-41 public health emergency), evaluation of the following organ systems was limited: Vitals/Constitutional/EENT/Resp/CV/GI//MS/Neuro/Skin/Heme-Lymph-Imm. Pursuant to the emergency declaration under the [facility][note 3][facility], 1135 waiver authority and the [employer] and [employer] Act, this Virtual  Visit was conducted, with patient's (and/or legal guardian's) consent, to reduce the patient's risk of exposure to COVID-19 and provide necessary medical care. Services were provided through a video synchronous discussion virtually to substitute for in-person clinic visit. Patient and provider were located at their individual homes.

## 2021-02-05 RX ORDER — FLUOXETINE HYDROCHLORIDE 20 MG/1
20 CAPSULE ORAL DAILY
Qty: 90 CAP | Refills: 1 | Status: SHIPPED | OUTPATIENT
Start: 2021-02-05 | End: 2021-05-04

## 2021-02-14 DIAGNOSIS — F51.02 ADJUSTMENT INSOMNIA: ICD-10-CM

## 2021-02-14 RX ORDER — TRAZODONE HYDROCHLORIDE 50 MG/1
50 TABLET ORAL
Qty: 30 TAB | Refills: 0 | OUTPATIENT
Start: 2021-02-14

## 2021-02-14 NOTE — TELEPHONE ENCOUNTER
We planned to hold the trazodone and see how she felt without it-I did not refill this med-can you call and see if she does want to resume it?  thanks

## 2021-02-23 ENCOUNTER — VIRTUAL VISIT (OUTPATIENT)
Dept: INTERNAL MEDICINE CLINIC | Age: 66
End: 2021-02-23
Payer: MEDICARE

## 2021-02-23 DIAGNOSIS — F32.A ANXIETY AND DEPRESSION: Primary | ICD-10-CM

## 2021-02-23 DIAGNOSIS — I47.20 V TACH: ICD-10-CM

## 2021-02-23 DIAGNOSIS — I10 ESSENTIAL HYPERTENSION: ICD-10-CM

## 2021-02-23 DIAGNOSIS — F41.9 ANXIETY AND DEPRESSION: Primary | ICD-10-CM

## 2021-02-23 PROCEDURE — 3017F COLORECTAL CA SCREEN DOC REV: CPT | Performed by: INTERNAL MEDICINE

## 2021-02-23 PROCEDURE — G9899 SCRN MAM PERF RSLTS DOC: HCPCS | Performed by: INTERNAL MEDICINE

## 2021-02-23 PROCEDURE — 99213 OFFICE O/P EST LOW 20 MIN: CPT | Performed by: INTERNAL MEDICINE

## 2021-02-23 PROCEDURE — G8756 NO BP MEASURE DOC: HCPCS | Performed by: INTERNAL MEDICINE

## 2021-02-23 PROCEDURE — G8419 CALC BMI OUT NRM PARAM NOF/U: HCPCS | Performed by: INTERNAL MEDICINE

## 2021-02-23 PROCEDURE — G8536 NO DOC ELDER MAL SCRN: HCPCS | Performed by: INTERNAL MEDICINE

## 2021-02-23 PROCEDURE — G8510 SCR DEP NEG, NO PLAN REQD: HCPCS | Performed by: INTERNAL MEDICINE

## 2021-02-23 PROCEDURE — 1101F PT FALLS ASSESS-DOCD LE1/YR: CPT | Performed by: INTERNAL MEDICINE

## 2021-02-23 PROCEDURE — 1090F PRES/ABSN URINE INCON ASSESS: CPT | Performed by: INTERNAL MEDICINE

## 2021-02-23 PROCEDURE — G0463 HOSPITAL OUTPT CLINIC VISIT: HCPCS | Performed by: INTERNAL MEDICINE

## 2021-02-23 PROCEDURE — G8427 DOCREV CUR MEDS BY ELIG CLIN: HCPCS | Performed by: INTERNAL MEDICINE

## 2021-02-23 PROCEDURE — G8399 PT W/DXA RESULTS DOCUMENT: HCPCS | Performed by: INTERNAL MEDICINE

## 2021-02-23 NOTE — PROGRESS NOTES
Consent: Kj Car, who was seen by synchronous (real-time) audio-video technology, and/or her healthcare decision maker, is aware that this patient-initiated, Telehealth encounter on 2/23/2021 is a billable service, with coverage as determined by her insurance carrier. She is aware that she may receive a bill and has provided verbal consent to proceed: YES  712  Subjective:   Kj Car is a 72 y.o. female who was seen for Medication Evaluation      Seen at follow up. She had been feeling irritable and was concerned it was from Trazodone. We stopped this and she notes less irritability and sleeping okay on Fluoxetine 20. We are going to continue that dose. Did have an episode of V-tach that she was not aware of while in evaluation with cardiology for a stress test.  Does see cardiology in June, cannot recall the name of the physician there on [facility], she will get me records. Blood pressure at home has been good on Losartan 50. Did have Moderna vaccine for COVID February 16th. No problems with this. Current Outpatient Medications   Medication Sig    FLUoxetine (PROzac) 20 mg capsule TAKE 1 CAP BY MOUTH DAILY.  atorvastatin (LIPITOR) 20 mg tablet TAKE 1 TABLET DAILY    losartan (COZAAR) 50 mg tablet Take  by mouth daily.  ibuprofen (MOTRIN) 400 mg tablet Take  by mouth every six (6) hours as needed for Pain. No current facility-administered medications for this visit. Allergies   Allergen Reactions    Apple Angioedema     Raw apples cause lip/eye to swelling  Peaches causes lip/eye swelling        Past Medical History:   Diagnosis Date    Abnormal mammogram of left breast 03/09/2017    left breast mass and poss nipple inversion- needs add views    Fatty liver     GERD (gastroesophageal reflux disease)     H/O diagnostic mammography 03/22/2017    Left breast dx mammogram & ultrasound- benign cysts    H/O mammogram 10/13/2020    Birads 2. Benign.     Headache(784.0)     migraines    History of mammogram 10/7/15; 3/12/18    Negative; Normal    Hx of biopsy 11/18/13    vulvar bx: benign    Hypercholesterolemia     Hypertension     BETTS (nonalcoholic steatohepatitis) 8/7/2020    abd us 5/20    Other ill-defined conditions(799.89)     hx vagal responces     Palpitations 8/7/2020    Stress test and holter dr Oropeza Dad 2020 normal    Pap smear for cervical cancer screening 03/09/2017; 10/13/20    neg, hpv neg    Premature ventricular beat     Tubulovillous adenoma 2007    Unspecified sleep apnea     NO CPAP       ROS  All other systems reviewed and negative, unless mentioned in HPI    Objective:   Vital Signs: (As obtained by patient/caregiver at home)  There were no vitals taken for this visit.      [INSTRUCTIONS:  \"[x]\" Indicates a positive item  \"[]\" Indicates a negative item  -- DELETE ALL ITEMS NOT EXAMINED]    Constitutional: [x] Appears well-developed and well-nourished [x] No apparent distress      [] Abnormal -     Mental status: [x] Alert and awake  [x] Oriented to person/place/time [x] Able to follow commands    [] Abnormal -     Eyes:   EOM    [x]  Normal    [] Abnormal -   Sclera  [x]  Normal    [] Abnormal -          Discharge [x]  None visible   [] Abnormal -     HENT: [x] Normocephalic, atraumatic  [] Abnormal -       External Ears [x] Normal  [] Abnormal -    Neck: [x] No visualized mass [] Abnormal -     Pulmonary/Chest: [x] Respiratory effort normal   [x] No visualized signs of difficulty breathing or respiratory distress        [] Abnormal -      Musculoskeletal:            [x] Normal range of motion of neck        [] Abnormal -     Neurological:        [x] No Facial Asymmetry (Cranial nerve 7 motor function) (limited exam due to video visit)          [x] No gaze palsy        [] Abnormal -          Skin:        [x] No significant exanthematous lesions or discoloration noted on facial skin         [] Abnormal -            Psychiatric: [x] Normal Affect [] Abnormal -           Other pertinent observable physical exam findings:-        Assessment & Plan:   Diagnoses and all orders for this visit:    1. Anxiety and depression    2. V tach ([address]    3. Essential hypertension    She will get me name of cardiologist who is following and has appt in June  Will see me fasting in may for full labs  Cont current meds        We discussed the expected course, resolution and complications of the diagnosis(es) in detail. Medication risks, benefits, costs, interactions, and alternatives were discussed as indicated. I advised her to contact the office if her condition worsens, changes or fails to improve as anticipated. She expressed understanding with the diagnosis(es) and plan. Santa Pena is a 72 y.o. female being evaluated by a video visit encounter for concerns as above. A caregiver was present when appropriate. Due to this being a TeleHealth encounter (During PDQMI-38 public health emergency), evaluation of the following organ systems was limited: Vitals/Constitutional/EENT/Resp/CV/GI//MS/Neuro/Skin/Heme-Lymph-Imm. Pursuant to the emergency declaration under the [facility], 1135 waiver authority and the [employer] and [employer] Act, this Virtual  Visit was conducted, with patient's (and/or legal guardian's) consent, to reduce the patient's risk of exposure to COVID-19 and provide necessary medical care. Services were provided through a video synchronous discussion virtually to substitute for in-person clinic visit. Patient and provider were located at their individual homes.

## 2021-03-23 RX ORDER — ATORVASTATIN CALCIUM 20 MG/1
TABLET, FILM COATED ORAL
Qty: 90 TAB | Refills: 1 | Status: SHIPPED | OUTPATIENT
Start: 2021-03-23 | End: 2021-06-16

## 2021-04-01 RX ORDER — LOSARTAN POTASSIUM 50 MG/1
TABLET ORAL
Qty: 90 TAB | Refills: 2 | Status: SHIPPED | OUTPATIENT
Start: 2021-04-01 | End: 2021-07-21 | Stop reason: ALTCHOICE

## 2021-05-03 DIAGNOSIS — F41.9 ANXIETY: ICD-10-CM

## 2021-05-04 RX ORDER — FLUOXETINE HYDROCHLORIDE 20 MG/1
20 CAPSULE ORAL DAILY
Qty: 90 CAP | Refills: 1 | Status: SHIPPED | OUTPATIENT
Start: 2021-05-04 | End: 2021-05-10 | Stop reason: DRUGHIGH

## 2021-05-10 ENCOUNTER — OFFICE VISIT (OUTPATIENT)
Dept: INTERNAL MEDICINE CLINIC | Age: 66
End: 2021-05-10
Payer: MEDICARE

## 2021-05-10 VITALS
DIASTOLIC BLOOD PRESSURE: 73 MMHG | OXYGEN SATURATION: 95 % | SYSTOLIC BLOOD PRESSURE: 122 MMHG | BODY MASS INDEX: 29.45 KG/M2 | RESPIRATION RATE: 16 BRPM | TEMPERATURE: 98.4 F | HEIGHT: 63 IN | HEART RATE: 74 BPM | WEIGHT: 166.2 LBS

## 2021-05-10 DIAGNOSIS — G47.30 SLEEP APNEA IN ADULT: ICD-10-CM

## 2021-05-10 DIAGNOSIS — Z00.00 WELCOME TO MEDICARE PREVENTIVE VISIT: Primary | ICD-10-CM

## 2021-05-10 DIAGNOSIS — Z98.890 S/P COLONOSCOPY: ICD-10-CM

## 2021-05-10 DIAGNOSIS — I10 ESSENTIAL HYPERTENSION: ICD-10-CM

## 2021-05-10 DIAGNOSIS — Z78.0 POSTMENOPAUSAL: ICD-10-CM

## 2021-05-10 DIAGNOSIS — K21.9 GASTROESOPHAGEAL REFLUX DISEASE WITHOUT ESOPHAGITIS: ICD-10-CM

## 2021-05-10 DIAGNOSIS — I47.20 V TACH: ICD-10-CM

## 2021-05-10 DIAGNOSIS — F41.9 ANXIETY: ICD-10-CM

## 2021-05-10 DIAGNOSIS — Z23 ENCOUNTER FOR IMMUNIZATION: ICD-10-CM

## 2021-05-10 LAB
25(OH)D3 SERPL-MCNC: 21.7 NG/ML (ref 30–100)
ALBUMIN SERPL-MCNC: 3.8 G/DL (ref 3.5–5)
ALBUMIN/GLOB SERPL: 1.1 {RATIO} (ref 1.1–2.2)
ALP SERPL-CCNC: 105 U/L (ref 45–117)
ALT SERPL-CCNC: 39 U/L (ref 12–78)
ANION GAP SERPL CALC-SCNC: 4 MMOL/L (ref 5–15)
AST SERPL-CCNC: 18 U/L (ref 15–37)
BASOPHILS # BLD: 0.1 K/UL (ref 0–0.1)
BASOPHILS NFR BLD: 1 % (ref 0–1)
BILIRUB SERPL-MCNC: 0.9 MG/DL (ref 0.2–1)
BUN SERPL-MCNC: 12 MG/DL (ref 6–20)
BUN/CREAT SERPL: 21 (ref 12–20)
CALCIUM SERPL-MCNC: 9.3 MG/DL (ref 8.5–10.1)
CHLORIDE SERPL-SCNC: 108 MMOL/L (ref 97–108)
CHOLEST SERPL-MCNC: 159 MG/DL
CO2 SERPL-SCNC: 27 MMOL/L (ref 21–32)
CREAT SERPL-MCNC: 0.57 MG/DL (ref 0.55–1.02)
DIFFERENTIAL METHOD BLD: ABNORMAL
EOSINOPHIL # BLD: 0.2 K/UL (ref 0–0.4)
EOSINOPHIL NFR BLD: 2 % (ref 0–7)
ERYTHROCYTE [DISTWIDTH] IN BLOOD BY AUTOMATED COUNT: 13.1 % (ref 11.5–14.5)
GLOBULIN SER CALC-MCNC: 3.6 G/DL (ref 2–4)
GLUCOSE SERPL-MCNC: 103 MG/DL (ref 65–100)
HCT VFR BLD AUTO: 44.6 % (ref 35–47)
HDLC SERPL-MCNC: 54 MG/DL
HDLC SERPL: 2.9 {RATIO} (ref 0–5)
HGB BLD-MCNC: 14.3 G/DL (ref 11.5–16)
IMM GRANULOCYTES # BLD AUTO: 0 K/UL (ref 0–0.04)
IMM GRANULOCYTES NFR BLD AUTO: 1 % (ref 0–0.5)
LDLC SERPL CALC-MCNC: 69.6 MG/DL (ref 0–100)
LIPID PROFILE,FLP: ABNORMAL
LYMPHOCYTES # BLD: 2.1 K/UL (ref 0.8–3.5)
LYMPHOCYTES NFR BLD: 31 % (ref 12–49)
MCH RBC QN AUTO: 30.2 PG (ref 26–34)
MCHC RBC AUTO-ENTMCNC: 32.1 G/DL (ref 30–36.5)
MCV RBC AUTO: 94.3 FL (ref 80–99)
MONOCYTES # BLD: 0.6 K/UL (ref 0–1)
MONOCYTES NFR BLD: 9 % (ref 5–13)
NEUTS SEG # BLD: 4 K/UL (ref 1.8–8)
NEUTS SEG NFR BLD: 56 % (ref 32–75)
NRBC # BLD: 0 K/UL (ref 0–0.01)
NRBC BLD-RTO: 0 PER 100 WBC
PLATELET # BLD AUTO: 350 K/UL (ref 150–400)
PMV BLD AUTO: 10.1 FL (ref 8.9–12.9)
POTASSIUM SERPL-SCNC: 4.7 MMOL/L (ref 3.5–5.1)
PROT SERPL-MCNC: 7.4 G/DL (ref 6.4–8.2)
RBC # BLD AUTO: 4.73 M/UL (ref 3.8–5.2)
SODIUM SERPL-SCNC: 139 MMOL/L (ref 136–145)
TRIGL SERPL-MCNC: 177 MG/DL (ref ?–150)
TSH SERPL DL<=0.05 MIU/L-ACNC: 1.12 UIU/ML (ref 0.36–3.74)
VLDLC SERPL CALC-MCNC: 35.4 MG/DL
WBC # BLD AUTO: 7 K/UL (ref 3.6–11)

## 2021-05-10 PROCEDURE — 93005 ELECTROCARDIOGRAM TRACING: CPT | Performed by: INTERNAL MEDICINE

## 2021-05-10 PROCEDURE — 93010 ELECTROCARDIOGRAM REPORT: CPT | Performed by: INTERNAL MEDICINE

## 2021-05-10 PROCEDURE — G8432 DEP SCR NOT DOC, RNG: HCPCS | Performed by: INTERNAL MEDICINE

## 2021-05-10 PROCEDURE — G8754 DIAS BP LESS 90: HCPCS | Performed by: INTERNAL MEDICINE

## 2021-05-10 PROCEDURE — 3017F COLORECTAL CA SCREEN DOC REV: CPT | Performed by: INTERNAL MEDICINE

## 2021-05-10 PROCEDURE — G8399 PT W/DXA RESULTS DOCUMENT: HCPCS | Performed by: INTERNAL MEDICINE

## 2021-05-10 PROCEDURE — G0438 PPPS, INITIAL VISIT: HCPCS | Performed by: INTERNAL MEDICINE

## 2021-05-10 PROCEDURE — 1101F PT FALLS ASSESS-DOCD LE1/YR: CPT | Performed by: INTERNAL MEDICINE

## 2021-05-10 PROCEDURE — 90732 PPSV23 VACC 2 YRS+ SUBQ/IM: CPT | Performed by: INTERNAL MEDICINE

## 2021-05-10 PROCEDURE — G8752 SYS BP LESS 140: HCPCS | Performed by: INTERNAL MEDICINE

## 2021-05-10 PROCEDURE — 99214 OFFICE O/P EST MOD 30 MIN: CPT | Performed by: INTERNAL MEDICINE

## 2021-05-10 PROCEDURE — G9899 SCRN MAM PERF RSLTS DOC: HCPCS | Performed by: INTERNAL MEDICINE

## 2021-05-10 PROCEDURE — G8427 DOCREV CUR MEDS BY ELIG CLIN: HCPCS | Performed by: INTERNAL MEDICINE

## 2021-05-10 PROCEDURE — 1090F PRES/ABSN URINE INCON ASSESS: CPT | Performed by: INTERNAL MEDICINE

## 2021-05-10 PROCEDURE — G8419 CALC BMI OUT NRM PARAM NOF/U: HCPCS | Performed by: INTERNAL MEDICINE

## 2021-05-10 PROCEDURE — G0463 HOSPITAL OUTPT CLINIC VISIT: HCPCS | Performed by: INTERNAL MEDICINE

## 2021-05-10 PROCEDURE — G8536 NO DOC ELDER MAL SCRN: HCPCS | Performed by: INTERNAL MEDICINE

## 2021-05-10 RX ORDER — TRAZODONE HYDROCHLORIDE 50 MG/1
50 TABLET ORAL AT BEDTIME
COMMUNITY
Start: 2021-01-20 | End: 2021-05-10 | Stop reason: ALTCHOICE

## 2021-05-10 RX ORDER — FLUOXETINE 10 MG/1
10 CAPSULE ORAL DAILY
Qty: 30 CAP | Refills: 3 | Status: SHIPPED | OUTPATIENT
Start: 2021-05-10 | End: 2021-08-18

## 2021-05-10 RX ORDER — METHYLPREDNISOLONE 4 MG/1
TABLET ORAL
COMMUNITY
Start: 2021-02-10 | End: 2021-05-10 | Stop reason: ALTCHOICE

## 2021-05-10 RX ORDER — DIPHENHYDRAMINE HCL 25 MG
25 CAPSULE ORAL
COMMUNITY

## 2021-05-10 NOTE — PATIENT INSTRUCTIONS
Dr galdamez  Is a cardiologist who does ep  work Medicare Wellness Visit, Female The best way to live healthy is to have a lifestyle where you eat a well-balanced diet, exercise regularly, limit alcohol use, and quit all forms of tobacco/nicotine, if applicable. Regular preventive services are another way to keep healthy. Preventive services (vaccines, screening tests, monitoring & exams) can help personalize your care plan, which helps you manage your own care. Screening tests can find health problems at the earliest stages, when they are easiest to treat. [facility] follows the current, evidence-based guidelines published by the [facility] ([facility]) when recommending preventive services for our patients. Because we follow these guidelines, sometimes recommendations change over time as research supports it. (For example, mammograms used to be recommended annually. Even though Medicare will still pay for an annual mammogram, the newer guidelines recommend a mammogram every two years for women of average risk). Of course, you and your doctor may decide to screen more often for some diseases, based on your risk and your co-morbidities (chronic disease you are already diagnosed with). Preventive services for you include: - Medicare offers their members a free annual wellness visit, which is time for you and your primary care provider to discuss and plan for your preventive service needs. Take advantage of this benefit every year! 
-All adults over the age of 72 should receive the recommended pneumonia vaccines. Current USPSTF guidelines recommend a series of two vaccines for the best pneumonia protection.  
-All adults should have a flu vaccine yearly and a tetanus vaccine every 10 years.  
-All adults age 48 and older should receive the shingles vaccines (series of two vaccines).      
-All adults age 38-68 who are overweight should have a diabetes screening test once every three years.  
-All adults born between [birth year] and 1965 should be screened once for Hepatitis C. 
-Other screening tests and preventive services for persons with diabetes include: an eye exam to screen for diabetic retinopathy, a kidney function test, a foot exam, and stricter control over your cholesterol.  
-Cardiovascular screening for adults with routine risk involves an electrocardiogram (ECG) at intervals determined by your doctor.  
-Colorectal cancer screenings should be done for adults age 54-65 with no increased risk factors for colorectal cancer. There are a number of acceptable methods of screening for this type of cancer. Each test has its own benefits and drawbacks. Discuss with your doctor what is most appropriate for you during your annual wellness visit. The different tests include: colonoscopy (considered the best screening method), a fecal occult blood test, a fecal DNA test, and sigmoidoscopy. 
 
-A bone mass density test is recommended when a woman turns 65 to screen for osteoporosis. This test is only recommended one time, as a screening. Some providers will use this same test as a disease monitoring tool if you already have osteoporosis. -Breast cancer screenings are recommended every other year for women of normal risk, age 54-69. 
-Cervical cancer screenings for women over age 72 are only recommended with certain risk factors. Here is a list of your current Health Maintenance items (your personalized list of preventive services) with a due date: 
Health Maintenance Due Topic Date Due  
 Hepatitis C Test  Never done  Shingles Vaccine (1 of 2) Never done  Colorectal Screening  Never done  Annual Well Visit  Never done

## 2021-05-11 NOTE — PROGRESS NOTES
Patient notified of low Vit D level & PCP's advice to start otc vit d3 4000 units daily. Advised other other labs are fine.

## 2021-05-11 NOTE — PROGRESS NOTES
HISTORY OF PRESENT ILLNESS  Larissa Magallanes is a 72 y.o. female. HPI  Patient is seen for a [place] to Medicare visit, but follow up on issues:  1. Hypertension, on Losartan 50. No cardiac symptoms, no cough. Little exercise. She does have occasional palpitations at night. They are brief. They do not make her syncopal.  She does have a follow up scheduled with cardiology because of question of V-tach in the past.  She has also had intermittent episodes of GERD. She is not having chest pain or dyspnea on exertion. 2. Dyslipidemia, on Atorvastatin 20. No myalgias. Due for labs. She has had some discomfort in her ankle. Review of Systems   Constitutional: Negative for chills, fever and weight loss. HENT: Negative for ear pain and hearing loss. Respiratory: Negative for cough, shortness of breath and wheezing. Cardiovascular: Positive for palpitations. Negative for chest pain, orthopnea, leg swelling and PND. Gastrointestinal: Negative for abdominal pain, constipation, diarrhea, heartburn and nausea. Genitourinary: Negative for dysuria. Musculoskeletal: Positive for joint pain. Negative for myalgias. Neurological: Negative for dizziness and headaches. Psychiatric/Behavioral: Negative for depression and memory loss. The patient does not have insomnia. All other systems reviewed and are negative. Physical Exam  Vitals signs and nursing note reviewed. Constitutional:       Appearance: She is well-developed. HENT:      Head: Normocephalic and atraumatic. Right Ear: Tympanic membrane normal.      Left Ear: Tympanic membrane normal.   Eyes:      Extraocular Movements: Extraocular movements intact. Pupils: Pupils are equal, round, and reactive to light. Neck:      Musculoskeletal: Normal range of motion and neck supple. Thyroid: No thyromegaly. Vascular: No carotid bruit. Cardiovascular:      Rate and Rhythm: Normal rate and regular rhythm.       Heart sounds: Normal heart sounds, S1 normal and S2 normal. No murmur. Pulmonary:      Effort: Pulmonary effort is normal. No respiratory distress. Breath sounds: Normal breath sounds. No wheezing or rales. Abdominal:      General: There is no distension. Palpations: Abdomen is soft. There is no mass. Tenderness: There is no abdominal tenderness. Musculoskeletal:      Right lower leg: No edema. Left lower leg: No edema. Skin:     Findings: No rash. Neurological:      Mental Status: She is alert and oriented to person, place, and time. Psychiatric:         Mood and Affect: Mood normal.         Behavior: Behavior normal.         ASSESSMENT and PLAN  Diagnoses and all orders for this visit:    1. Welcome to Medicare preventive visit  -     AMB POC EKG ROUTINE W/ 12 LEADS, INTER & REP  -     LIPID PANEL; Future    2. Essential hypertension-controlled  -     AMB POC EKG ROUTINE W/ 12 LEADS, INTER & REP  -     METABOLIC PANEL, COMPREHENSIVE; Future  -     LIPID PANEL; Future    3. V tach ([address]-? In past no recent significant sxs  Agree with cardiology eval  -     AMB POC EKG ROUTINE W/ 12 LEADS, INTER & REP  -     TSH 3RD GENERATION; Future    4. Sleep apnea in adult  -     SLEEP MEDICINE REFERRAL    5. Gastroesophageal reflux disease without esophagitis-avoid nsaids-     CBC WITH AUTOMATED DIFF; Future    6. Anxiety  -     FLUoxetine (PROzac) 10 mg capsule; Take 1 Cap by mouth daily. 7. Encounter for immunization  -     PNEUMOCOCCAL POLYSACCHARIDE VACCINE, 23-VALENT, ADULT OR IMMUNOSUPPRESSED PT DOSE,    8. S/P colonoscopy    9. Postmenopausal  -     DEXA BONE DENSITY STUDY AXIAL;  Future  -     VITAMIN D, 25 HYDROXY; Future    This is a \"Welcome to [employer]"  Initial Preventive Physical Examination (IPPE) providing Personalized Prevention Plan Services (Performed in the first 12 months of enrollment)    I have reviewed the patient's medical history in detail and updated the computerized patient record. Assessment/Plan   Education and counseling provided:  Are appropriate based on today's review and evaluation  Pneumococcal Vaccine  Influenza Vaccine  Screening Mammography  Screening Pap and pelvic (covered once every 2 years)  Colorectal cancer screening tests  Bone mass measurement (DEXA)    1. Welcome to Medicare preventive visit  -     AMB POC EKG ROUTINE W/ 12 LEADS, INTER & REP  -     LIPID PANEL; Future  2. Essential hypertension  -     AMB POC EKG ROUTINE W/ 12 LEADS, INTER & REP  -     METABOLIC PANEL, COMPREHENSIVE; Future  -     LIPID PANEL; Future  3. V tach (HCC)  -     AMB POC EKG ROUTINE W/ 12 LEADS, INTER & REP  -     TSH 3RD GENERATION; Future  4. Sleep apnea in adult  -     SLEEP MEDICINE REFERRAL  5. Gastroesophageal reflux disease without esophagitis  -     CBC WITH AUTOMATED DIFF; Future  6. Anxiety  -     FLUoxetine (PROzac) 10 mg capsule; Take 1 Cap by mouth daily. , Normal, Disp-30 Cap, R-3  7. Encounter for immunization  -     PNEUMOCOCCAL POLYSACCHARIDE VACCINE, 23-VALENT, ADULT OR IMMUNOSUPPRESSED PT DOSE,  8. S/P colonoscopy  9. Postmenopausal  -     DEXA BONE DENSITY STUDY AXIAL; Future  -     VITAMIN D, 25 HYDROXY; Future       Depression Risk Screen     3 most recent PHQ Screens 2/23/2021   PHQ Not Done -   Little interest or pleasure in doing things Not at all   Feeling down, depressed, irritable, or hopeless Not at all   Total Score PHQ 2 0       Alcohol Risk Screen    Do you average more than 1 drink per night or more than 7 drinks a week:  No    On any one occasion in the past three months have you have had more than 3 drinks containing alcohol:  No          Functional Ability and Level of Safety    Diet: No special diet      Hearing: Hearing is good. Vision Screening:  Vision is good. No exam data present      Activities of Daily Living: The home contains: no safety equipment.   Patient does total self care      Ambulation: with no difficulty Exercise level: moderately active     Fall Risk Screen:  Fall Risk Assessment, last 12 mths 2/23/2021   Able to walk? Yes   Fall in past 12 months? 0   Do you feel unsteady? 0   Are you worried about falling 0      Abuse Screen:  Patient is not abused       Screening EKG   EKG order placed: Yes    End of Life Planning   Advanced care planning directives were not discussed with the patient and/or family/caregiver. Health Maintenance Due     Health Maintenance Due   Topic Date Due    Hepatitis C Screening  Never done    Shingrix Vaccine Age 49> (1 of 2) Never done    Colorectal Cancer Screening Combo  Never done    Medicare Yearly Exam  Never done       Patient Care Team   Patient Care Team:  Elin Orozco MD as PCP - General (Internal Medicine)  [facility]  Orly Ghosh MD (Obstetrics & Gynecology)    History     Past Medical History:   Diagnosis Date    Abnormal mammogram of left breast 03/09/2017    left breast mass and poss nipple inversion- needs add views    Fatty liver     GERD (gastroesophageal reflux disease)     H/O diagnostic mammography 03/22/2017    Left breast dx mammogram & ultrasound- benign cysts    H/O mammogram 10/13/2020    Birads 2. Benign.     Headache(784.0)     migraines    History of mammogram 10/7/15; 3/12/18    Negative; Normal    Hx of biopsy 11/18/13    vulvar bx: benign    Hypercholesterolemia     Hypertension     BETTS (nonalcoholic steatohepatitis) 8/7/2020    abd us 5/20    Other ill-defined conditions(799.89)     hx vagal responces     Palpitations 8/7/2020    Stress test and holter dr lopez 2020 normal    Pap smear for cervical cancer screening 03/09/2017; 10/13/20    neg, hpv neg    Premature ventricular beat     S/P colonoscopy 5/10/2021    Dr Min Patel 2019 5 year repeat     Tubulovillous adenoma 2007    Unspecified sleep apnea     NO CPAP    V tach ([address] 5/10/2021    Cardiology dr lenny Luis Past Surgical History:   Procedure Laterality Date    ENDOSCOPY, COLON, DIAGNOSTIC  8/2010    HX BILATERAL SALPINGO-OOPHORECTOMY  1/8/14    benign    HX CATARACT REMOVAL      bilateral    HX CHOLECYSTECTOMY      HX LAP CHOLECYSTECTOMY      CAT Pace.     HX OTHER SURGICAL      Cataract removal - 2001    HX OTHER SURGICAL  2011    EPIDURAL STEROID INJ LOW BACK    HX SALPINGO-OOPHORECTOMY      prophylactic due to family hx ovarian ca     Current Outpatient Medications   Medication Sig Dispense Refill    diphenhydrAMINE (BenadryL) 25 mg capsule Take 25 mg by mouth every six (6) hours as needed.  FLUoxetine (PROzac) 10 mg capsule Take 1 Cap by mouth daily. 30 Cap 3    losartan (COZAAR) 50 mg tablet TAKE 1 TABLET BY MOUTH DAILY 90 Tab 2    atorvastatin (LIPITOR) 20 mg tablet TAKE 1 TABLET DAILY 90 Tab 1    ibuprofen (MOTRIN) 400 mg tablet Take  by mouth every six (6) hours as needed for Pain. Allergies   Allergen Reactions    Apple Angioedema     Raw apples cause lip/eye to swelling  Peaches causes lip/eye swelling        Family History   Problem Relation Age of Onset    Ovarian Cancer Mother     Cancer Mother         ovarian    Dementia Father     Other Father         Insomnia    Diabetes Maternal Grandmother     Cancer Paternal Grandmother         colon    Parkinson's Disease Paternal Grandfather     Diabetes Other     Heart Disease Neg Hx     Hypertension Neg Hx      Social History     Tobacco Use    Smoking status: Never Smoker    Smokeless tobacco: Never Used   Substance Use Topics    Alcohol use:  Yes     Alcohol/week: 0.0 standard drinks     Comment: 2 DRINKS PER MONTH       Olinda Angeles MD

## 2021-05-18 ENCOUNTER — HOSPITAL ENCOUNTER (OUTPATIENT)
Dept: MAMMOGRAPHY | Age: 66
Discharge: HOME | End: 2021-05-18
Attending: INTERNAL MEDICINE
Payer: MEDICARE

## 2021-05-18 DIAGNOSIS — Z78.0 POSTMENOPAUSAL: ICD-10-CM

## 2021-05-18 PROCEDURE — 77080 DXA BONE DENSITY AXIAL: CPT

## 2021-06-16 RX ORDER — ATORVASTATIN CALCIUM 20 MG/1
TABLET, FILM COATED ORAL
Qty: 90 TABLET | Refills: 1 | Status: SHIPPED | OUTPATIENT
Start: 2021-06-16 | End: 2021-11-11

## 2021-06-29 ENCOUNTER — OFFICE VISIT (OUTPATIENT)
Dept: CARDIOLOGY CLINIC | Age: 66
End: 2021-06-29
Payer: MEDICARE

## 2021-06-29 VITALS
SYSTOLIC BLOOD PRESSURE: 106 MMHG | HEART RATE: 78 BPM | BODY MASS INDEX: 29.06 KG/M2 | HEIGHT: 63 IN | WEIGHT: 164 LBS | DIASTOLIC BLOOD PRESSURE: 64 MMHG | RESPIRATION RATE: 16 BRPM

## 2021-06-29 DIAGNOSIS — I10 ESSENTIAL HYPERTENSION: ICD-10-CM

## 2021-06-29 DIAGNOSIS — I47.20 VT (VENTRICULAR TACHYCARDIA): Primary | ICD-10-CM

## 2021-06-29 DIAGNOSIS — R00.2 PALPITATIONS: ICD-10-CM

## 2021-06-29 PROCEDURE — G8754 DIAS BP LESS 90: HCPCS | Performed by: INTERNAL MEDICINE

## 2021-06-29 PROCEDURE — 3017F COLORECTAL CA SCREEN DOC REV: CPT | Performed by: INTERNAL MEDICINE

## 2021-06-29 PROCEDURE — G0463 HOSPITAL OUTPT CLINIC VISIT: HCPCS | Performed by: INTERNAL MEDICINE

## 2021-06-29 PROCEDURE — 1090F PRES/ABSN URINE INCON ASSESS: CPT | Performed by: INTERNAL MEDICINE

## 2021-06-29 PROCEDURE — G8427 DOCREV CUR MEDS BY ELIG CLIN: HCPCS | Performed by: INTERNAL MEDICINE

## 2021-06-29 PROCEDURE — G8536 NO DOC ELDER MAL SCRN: HCPCS | Performed by: INTERNAL MEDICINE

## 2021-06-29 PROCEDURE — G8752 SYS BP LESS 140: HCPCS | Performed by: INTERNAL MEDICINE

## 2021-06-29 PROCEDURE — G9899 SCRN MAM PERF RSLTS DOC: HCPCS | Performed by: INTERNAL MEDICINE

## 2021-06-29 PROCEDURE — G8432 DEP SCR NOT DOC, RNG: HCPCS | Performed by: INTERNAL MEDICINE

## 2021-06-29 PROCEDURE — 1101F PT FALLS ASSESS-DOCD LE1/YR: CPT | Performed by: INTERNAL MEDICINE

## 2021-06-29 PROCEDURE — 99204 OFFICE O/P NEW MOD 45 MIN: CPT | Performed by: INTERNAL MEDICINE

## 2021-06-29 PROCEDURE — G8399 PT W/DXA RESULTS DOCUMENT: HCPCS | Performed by: INTERNAL MEDICINE

## 2021-06-29 PROCEDURE — G8419 CALC BMI OUT NRM PARAM NOF/U: HCPCS | Performed by: INTERNAL MEDICINE

## 2021-06-29 RX ORDER — MELATONIN
400 DAILY
COMMUNITY

## 2021-06-29 NOTE — PATIENT INSTRUCTIONS
You will be scheduled for a Stress Echocardiogram after your appointment today. Please wear comfortable clothing (shorts or pants with a shirt or blouse) and walking/athletic shoes.     Do not eat or drink anything, except water, for at least 2 hours prior to your test.    Do take your scheduled medications prior to your test.

## 2021-07-01 NOTE — PROGRESS NOTES
Cardiac Electrophysiology OFFICE Consultation Note     Subjective:      Claudio Barkley is a 72 y.o. patient who is seen for evaluation of  NSVT and PVC on holter from [facility] (read by Dr Emeli Hogan)  She had stress test years ago  She is kindly referred from Dr Amrik Escobar  She has no syncope and overall not much symptoms except for some palpitation   No hx of sudden death in family  Mother  young from ovarian cancer   she saw Dr Radha Ashton for hypertension and hx of chest pain esophageal spasm      Patient Active Problem List   Diagnosis Code    Overweight (BMI 25.0-29. 9) E66.3    Other and unspecified hyperlipidemia E78.5    HTN (hypertension) I10    JOLLY (obstructive sleep apnea) G47.33    Anxiety F41.9    FH: ovarian cancer Z80.41    UMA (stress urinary incontinence, female) N39.3    Gastroesophageal reflux disease without esophagitis K21.9    Palpitations R00.2    BETTS (nonalcoholic steatohepatitis) K75.81    V tach (HCC) I47.2    S/P colonoscopy Z98.890     Current Outpatient Medications   Medication Sig Dispense Refill    cholecalciferol (Vitamin D3) (1000 Units /25 mcg) tablet Take 400 Units by mouth daily.  atorvastatin (LIPITOR) 20 mg tablet TAKE 1 TABLET DAILY 90 Tablet 1    diphenhydrAMINE (BenadryL) 25 mg capsule Take 25 mg by mouth every six (6) hours as needed.  FLUoxetine (PROzac) 10 mg capsule Take 1 Cap by mouth daily. 30 Cap 3    losartan (COZAAR) 50 mg tablet TAKE 1 TABLET BY MOUTH DAILY 90 Tab 2    ibuprofen (MOTRIN) 400 mg tablet Take  by mouth every six (6) hours as needed for Pain.        Allergies   Allergen Reactions    Apple Angioedema     Raw apples cause lip/eye to swelling  Peaches causes lip/eye swelling      Past Medical History:   Diagnosis Date    Abnormal mammogram of left breast 2017    left breast mass and poss nipple inversion- needs add views    Fatty liver     GERD (gastroesophageal reflux disease)     H/O diagnostic mammography 2017 Left breast dx mammogram & ultrasound- benign cysts    H/O mammogram 10/13/2020    Birads 2. Benign.  Headache(784.0)     migraines    History of mammogram 10/7/15; 3/12/18    Negative; Normal    Hx of biopsy 11/18/13    vulvar bx: benign    Hypercholesterolemia     Hypertension     BETTS (nonalcoholic steatohepatitis) 8/7/2020    abd us 5/20    Other ill-defined conditions(799.89)     hx vagal responces     Palpitations 8/7/2020    Stress test and holter dr lopez 2020 normal    Pap smear for cervical cancer screening 03/09/2017; 10/13/20    neg, hpv neg    Premature ventricular beat     S/P colonoscopy 5/10/2021    Dr Alonso Cross 2019 5 year repeat     Tubulovillous adenoma 2007    Unspecified sleep apnea     NO CPAP    V tach ([address] 5/10/2021    Cardiology dr lenny Rowland     Past Surgical History:   Procedure Laterality Date    ENDOSCOPY, COLON, DIAGNOSTIC  8/2010    HX BILATERAL SALPINGO-OOPHORECTOMY  1/8/14    benign    HX CATARACT REMOVAL      bilateral    HX CHOLECYSTECTOMY      HX LAP CHOLECYSTECTOMY      CAT Pace.     HX OTHER SURGICAL      Cataract removal - 2001    HX OTHER SURGICAL  2011    EPIDURAL STEROID INJ LOW BACK    HX SALPINGO-OOPHORECTOMY      prophylactic due to family hx ovarian ca     Family History   Problem Relation Age of Onset    Ovarian Cancer Mother     Cancer Mother         ovarian    Dementia Father     Other Father         Insomnia    Diabetes Maternal Grandmother     Cancer Paternal Grandmother         colon    Parkinson's Disease Paternal Grandfather     Diabetes Other     Heart Disease Neg Hx     Hypertension Neg Hx      Social History     Tobacco Use    Smoking status: Never Smoker    Smokeless tobacco: Never Used   Substance Use Topics    Alcohol use: Yes     Alcohol/week: 0.0 standard drinks     Comment: 2 DRINKS PER MONTH        Review of Systems:   Constitutional: Negative for fever, chills, weight loss, malaise/fatigue.    HEENT: Negative for nosebleeds, vision changes. Respiratory: Negative for cough, hemoptysis  Cardiovascular: Negative for chest pain, + palpitations, no orthopnea, claudication, leg swelling, syncope, and PND. Gastrointestinal: Negative for nausea, vomiting, diarrhea, blood in stool and melena. Genitourinary: Negative for dysuria, and hematuria. Musculoskeletal: Negative for myalgias, arthralgia. Skin: Negative for rash. Heme: Does not bleed or bruise easily. Neurological: Negative for speech change and focal weakness     Objective:     Visit Vitals  /64 (BP 1 Location: Left upper arm, BP Patient Position: Sitting)   Pulse 78   Resp 16   Ht 5' 3\" (1.6 m)   Wt 164 lb (74.4 kg)   BMI 29.05 kg/m²      Physical Exam:   Constitutional: well-developed and well-nourished. No respiratory distress. Head: Normocephalic and atraumatic. Eyes: Pupils are equal, round  ENT: hearing normal  Neck: supple. No JVD present. Cardiovascular: Normal rate, regular rhythm. Exam reveals no gallop and no friction rub. No murmur heard. Pulmonary/Chest: Effort normal and breath sounds normal. No wheezes. Abdominal: Soft, no tenderness. Musculoskeletal: no edema. Neurological: alert,oriented. Skin: Skin is warm and dry  Psychiatric: normal mood and affect. Behavior is normal. Judgment and thought content normal.         Assessment/Plan:       ICD-10-CM ICD-9-CM    1. VT (ventricular tachycardia) ([facility])  I47.2 427.1 ECHO STRESS   2. Essential hypertension  I10 401.9    3.  Palpitations  R00.2 785.1      Orders Placed This Encounter    cholecalciferol (Vitamin D3) (1000 Units /25 mcg) tablet      She also has occasional PVC and PAC on the holter report she showed me  It was done at [facility] practice   She did not have CAD or structural heart disease evaluation yet so I recommend exercise stress echo for both CAD and IHSS  She agrees to proceed  She does not think she needs medication to control palpitation symptoms yet  bp is low but not dizzy    Addendum  PVC and occasional couplet with stress  No ischemia and no IHSS  Recommend to replace losartan with toprol XL 50 mg every day  Follow up 3 months    Future Appointments   Date Time Provider Jose Castellanos   7/20/2021 11:30 AM ECHOTHEATHER ALCALA AMB   7/20/2021 11:30 AM HEATHER SCHWARTZ AMB         Thank you for involving me in this patient's care and please call with further concerns or questions. Bonnie Pollock M.D.   Electrophysiology/Cardiology  [facility] [place]  [address] [place] [address]                             [phone]

## 2021-07-13 ENCOUNTER — DOCUMENTATION ONLY (OUTPATIENT)
Dept: CARDIOLOGY CLINIC | Age: 66
End: 2021-07-13

## 2021-07-13 NOTE — PROGRESS NOTES
Received holter report dated 05/27/2020-05/29/2020 from [facility]. HR , avg 63. No pauses >2 seconds. Total 611 supraventricular events, no SVT. PVCs x 28, one couplet.   Single episode NSVT x 6 beats, max rate 279 bpm.

## 2021-07-20 ENCOUNTER — APPOINTMENT (OUTPATIENT)
Dept: CARDIOLOGY CLINIC | Age: 66
End: 2021-07-20

## 2021-07-20 ENCOUNTER — ANCILLARY PROCEDURE (OUTPATIENT)
Dept: CARDIOLOGY CLINIC | Age: 66
End: 2021-07-20
Payer: MEDICARE

## 2021-07-20 VITALS
DIASTOLIC BLOOD PRESSURE: 82 MMHG | WEIGHT: 164 LBS | HEIGHT: 63 IN | BODY MASS INDEX: 29.06 KG/M2 | SYSTOLIC BLOOD PRESSURE: 114 MMHG

## 2021-07-20 DIAGNOSIS — I47.20 VT (VENTRICULAR TACHYCARDIA): ICD-10-CM

## 2021-07-20 PROCEDURE — 93351 STRESS TTE COMPLETE: CPT | Performed by: INTERNAL MEDICINE

## 2021-07-21 ENCOUNTER — TELEPHONE (OUTPATIENT)
Dept: CARDIOLOGY CLINIC | Age: 66
End: 2021-07-21

## 2021-07-21 LAB
ECHO AO ROOT DIAM: 3.49 CM
STRESS ANGINA INDEX: 0
STRESS BASELINE DIAS BP: 82 MMHG
STRESS BASELINE HR: 73 BPM
STRESS BASELINE SYS BP: 114 MMHG
STRESS ESTIMATED WORKLOAD: 9.9 METS
STRESS EXERCISE DUR MIN: NORMAL
STRESS O2 SAT PEAK: 96 %
STRESS PEAK DIAS BP: 90 MMHG
STRESS PEAK SYS BP: 190 MMHG
STRESS PERCENT HR ACHIEVED: 99 %
STRESS POST PEAK HR: 153 BPM
STRESS RATE PRESSURE PRODUCT: NORMAL BPM*MMHG
STRESS SR DUKE TREADMILL SCORE: 0
STRESS ST DEPRESSION: 0 MM
STRESS ST ELEVATION: 0 MM
STRESS TARGET HR: 155 BPM

## 2021-07-21 RX ORDER — METOPROLOL SUCCINATE 50 MG/1
50 TABLET, EXTENDED RELEASE ORAL DAILY
Qty: 90 TABLET | Refills: 1 | Status: SHIPPED | OUTPATIENT
Start: 2021-07-21 | End: 2021-10-14

## 2021-07-21 NOTE — TELEPHONE ENCOUNTER
----- Message from Luz Lawrence MD sent at 7/21/2021  6:48 AM EDT -----  PVC and occasional couplet with stress  No ischemia and no IHSS  Recommend to replace losartan with toprol XL 50 mg every day  Follow up 3 months

## 2021-07-21 NOTE — TELEPHONE ENCOUNTER
Verified patient with two types of identifiers. Notified patient of results and MD recommendation. Patient agreeable to change and will update Dr. Che Mann. Patient feels she only needs to follow up with Dr. Che Mann at this time. Verified pharmacy. Patient verbalized understanding and will call with any other questions.

## 2021-07-21 NOTE — PROGRESS NOTES
PVC and occasional couplet with stress  No ischemia and no IHSS  Recommend to replace losartan with toprol XL 50 mg every day  Follow up 3 months

## 2021-08-16 ENCOUNTER — TELEPHONE (OUTPATIENT)
Dept: INTERNAL MEDICINE CLINIC | Age: 66
End: 2021-08-16

## 2021-08-16 NOTE — TELEPHONE ENCOUNTER
Pt calling states she has a bad cough for over a week now. Pt states she would like to see  this week before she leaves Sunday for a 3 week trip. Pt states her  has an appt tomorrow to see Lasha Max and pt wants to know if possible she can take his appt instead if  can not see her this week. Please call back and advise.

## 2021-08-17 ENCOUNTER — OFFICE VISIT (OUTPATIENT)
Dept: INTERNAL MEDICINE CLINIC | Age: 66
End: 2021-08-17
Payer: MEDICARE

## 2021-08-17 VITALS
SYSTOLIC BLOOD PRESSURE: 118 MMHG | BODY MASS INDEX: 29.13 KG/M2 | HEIGHT: 63 IN | TEMPERATURE: 98.2 F | RESPIRATION RATE: 16 BRPM | OXYGEN SATURATION: 95 % | DIASTOLIC BLOOD PRESSURE: 72 MMHG | HEART RATE: 73 BPM | WEIGHT: 164.4 LBS

## 2021-08-17 DIAGNOSIS — R05.9 COUGH: ICD-10-CM

## 2021-08-17 DIAGNOSIS — J06.9 ACUTE URI: ICD-10-CM

## 2021-08-17 DIAGNOSIS — J06.9 ACUTE URI: Primary | ICD-10-CM

## 2021-08-17 PROCEDURE — 99213 OFFICE O/P EST LOW 20 MIN: CPT | Performed by: INTERNAL MEDICINE

## 2021-08-17 PROCEDURE — G9899 SCRN MAM PERF RSLTS DOC: HCPCS | Performed by: INTERNAL MEDICINE

## 2021-08-17 PROCEDURE — G8427 DOCREV CUR MEDS BY ELIG CLIN: HCPCS | Performed by: INTERNAL MEDICINE

## 2021-08-17 PROCEDURE — G8432 DEP SCR NOT DOC, RNG: HCPCS | Performed by: INTERNAL MEDICINE

## 2021-08-17 PROCEDURE — 1101F PT FALLS ASSESS-DOCD LE1/YR: CPT | Performed by: INTERNAL MEDICINE

## 2021-08-17 PROCEDURE — G0463 HOSPITAL OUTPT CLINIC VISIT: HCPCS | Performed by: INTERNAL MEDICINE

## 2021-08-17 PROCEDURE — G8754 DIAS BP LESS 90: HCPCS | Performed by: INTERNAL MEDICINE

## 2021-08-17 PROCEDURE — G8752 SYS BP LESS 140: HCPCS | Performed by: INTERNAL MEDICINE

## 2021-08-17 PROCEDURE — G8536 NO DOC ELDER MAL SCRN: HCPCS | Performed by: INTERNAL MEDICINE

## 2021-08-17 PROCEDURE — G8399 PT W/DXA RESULTS DOCUMENT: HCPCS | Performed by: INTERNAL MEDICINE

## 2021-08-17 PROCEDURE — 1090F PRES/ABSN URINE INCON ASSESS: CPT | Performed by: INTERNAL MEDICINE

## 2021-08-17 PROCEDURE — G8419 CALC BMI OUT NRM PARAM NOF/U: HCPCS | Performed by: INTERNAL MEDICINE

## 2021-08-17 PROCEDURE — 3017F COLORECTAL CA SCREEN DOC REV: CPT | Performed by: INTERNAL MEDICINE

## 2021-08-17 RX ORDER — BUDESONIDE AND FORMOTEROL FUMARATE DIHYDRATE 160; 4.5 UG/1; UG/1
2 AEROSOL RESPIRATORY (INHALATION) 2 TIMES DAILY
Qty: 1 INHALER | Refills: 0 | Status: SHIPPED | OUTPATIENT
Start: 2021-08-17 | End: 2021-08-17

## 2021-08-17 RX ORDER — BUDESONIDE AND FORMOTEROL FUMARATE DIHYDRATE 160; 4.5 UG/1; UG/1
AEROSOL RESPIRATORY (INHALATION)
Qty: 1 INHALER | Refills: 2 | Status: SHIPPED | OUTPATIENT
Start: 2021-08-17 | End: 2022-02-09

## 2021-08-17 RX ORDER — METHYLPREDNISOLONE 4 MG/1
TABLET ORAL
Qty: 1 DOSE PACK | Refills: 0 | Status: SHIPPED | OUTPATIENT
Start: 2021-08-17 | End: 2022-02-09

## 2021-08-17 NOTE — PROGRESS NOTES
HISTORY OF PRESENT ILLNESS  Bear Earl is a 72 y.o. female. BE[place] Stoner [place] the beach [place]. Her daughter-in-law and grandchild had a cold, were never tested specifically for COVID. She began to have symptoms nine days ago, low grade temp of 99, some cough productive of clear phlegm, a little congestion. No changes in smell, no sore throat, no GI symptoms. On August 13th she went to an urgent care center, where rapid COVID test was negative. She was given Zithromax. She does feel she is better than she was last week. Yesterday did feel a little labored. She is supposed to fly to the [employer] this weekend. Review of Systems   Constitutional: Positive for malaise/fatigue. Negative for chills, diaphoresis and fever. HENT: Positive for congestion. Negative for ear discharge, ear pain, nosebleeds and sore throat. Eyes: Negative for pain and discharge. Respiratory: Positive for cough. Negative for hemoptysis, sputum production, shortness of breath and wheezing. Cardiovascular: Negative for chest pain. Neurological: Negative for headaches. Physical Exam  Vitals and nursing note reviewed. Constitutional:       Appearance: She is well-developed. She is not ill-appearing. HENT:      Head: Normocephalic. Right Ear: Tympanic membrane, ear canal and external ear normal.      Left Ear: Tympanic membrane, ear canal and external ear normal.      Nose: Nose normal.      Mouth/Throat:      Pharynx: No oropharyngeal exudate. Eyes:      General:         Right eye: No discharge. Left eye: No discharge. Conjunctiva/sclera: Conjunctivae normal.      Pupils: Pupils are equal, round, and reactive to light. Cardiovascular:      Rate and Rhythm: Normal rate and regular rhythm. Heart sounds: Normal heart sounds. Pulmonary:      Effort: Pulmonary effort is normal. No respiratory distress. Breath sounds: Wheezing present. No rales.    Musculoskeletal: Cervical back: Normal range of motion and neck supple. Lymphadenopathy:      Cervical: No cervical adenopathy. ASSESSMENT and PLAN  Diagnoses and all orders for this visit:    1. Acute URI  -     budesonide-formoteroL (SYMBICORT) 160-4.5 mcg/actuation HFAA; Take 2 Puffs by inhalation two (2) times a day. -     methylPREDNISolone (MEDROL DOSEPACK) 4 mg tablet; Per pack    2. Cough    Advised pcr covid testing  Act as if covid and quarantine for full 10 days  Follow up if signs and symptoms worsen or change. After hours number given.

## 2021-08-17 NOTE — PROGRESS NOTES
Hernandez Guadalupe  Identified pt with two pt identifiers(name and ). Chief Complaint   Patient presents with    Cough     pt is presenting today for a cough that started about 10 days ago was seen at an [facility]gent [facility] on  was given a zpack       1. Have you been to the ER, urgent care clinic since your last visit? Hospitalized since your last visit? NO    2. Have you seen or consulted any other health care providers outside of the [address] [place] [address] since your last visit? Include any pap smears or colon screening. NO      Provider notified of reason for visit, vitals and flowsheets obtained on patients.      Patient received paperwork for advance directive during previous visit but has not completed at this time     Reviewed record In preparation for visit, huddled with provider and have obtained necessary documentation      Health Maintenance Due   Topic    Hepatitis C Screening     Colorectal Cancer Screening Combo     Shingrix Vaccine Age 50> (1 of 2)       Wt Readings from Last 3 Encounters:   21 164 lb 6.4 oz (74.6 kg)   21 164 lb (74.4 kg)   21 164 lb (74.4 kg)     Temp Readings from Last 3 Encounters:   21 98.2 °F (36.8 °C) (Oral)   05/10/21 98.4 °F (36.9 °C) (Oral)   20 98.5 °F (36.9 °C) (Oral)     BP Readings from Last 3 Encounters:   21 118/72   21 114/82   21 106/64     Pulse Readings from Last 3 Encounters:   21 73   21 78   05/10/21 74     Vitals:    21 1313   BP: 118/72   Pulse: 73   Resp: 16   Temp: 98.2 °F (36.8 °C)   TempSrc: Oral   SpO2: 95%   Weight: 164 lb 6.4 oz (74.6 kg)   Height: 5' 3\" (1.6 m)   PainSc:   0 - No pain         Learning Assessment:  :     Learning Assessment 2014   PRIMARY LEARNER Patient   HIGHEST LEVEL OF EDUCATION - PRIMARY LEARNER  2 YEARS OF COLLEGE   BARRIERS PRIMARY LEARNER NONE   CO-LEARNER CAREGIVER No   PRIMARY LANGUAGE ENGLISH   LEARNER PREFERENCE PRIMARY OTHER (COMMENT)   ANSWERED BY self   RELATIONSHIP SELF       Depression Screening:  :     3 most recent PHQ Screens 2/23/2021   PHQ Not Done -   Little interest or pleasure in doing things Not at all   Feeling down, depressed, irritable, or hopeless Not at all   Total Score PHQ 2 0       Fall Risk Assessment:  :     Fall Risk Assessment, last 12 mths 2/23/2021   Able to walk? Yes   Fall in past 12 months? 0   Do you feel unsteady? 0   Are you worried about falling 0       Abuse Screening:  :     Abuse Screening Questionnaire 2/23/2021 2/2/2021 8/7/2020 10/11/2018 8/3/2017   Do you ever feel afraid of your partner? N N N N N   Are you in a relationship with someone who physically or mentally threatens you? N N N N N   Is it safe for you to go home? Y Y Y Y Y       ADL Screening:  :     No flowsheet data found. Medication reconciliation up to date and corrected with patient at this time.

## 2021-08-18 DIAGNOSIS — F41.9 ANXIETY: ICD-10-CM

## 2021-08-18 RX ORDER — FLUOXETINE 10 MG/1
10 CAPSULE ORAL DAILY
Qty: 30 CAPSULE | Refills: 3 | Status: SHIPPED | OUTPATIENT
Start: 2021-08-18 | End: 2022-01-31

## 2021-10-05 ENCOUNTER — TELEPHONE (OUTPATIENT)
Dept: INTERNAL MEDICINE CLINIC | Age: 66
End: 2021-10-05

## 2021-10-05 NOTE — TELEPHONE ENCOUNTER
----- Message from ROULA RUFF sent at 10/5/2021  1:09 PM EDT -----  Regarding: Dr. Shelley Garcia telephone  Contact: [phone]  General Message/Vendor Calls    Caller's first and last name: n/a       Reason for call: Patient wants to inform that she will being a kidney donor and wants to make sure about comments or concerns in reference.        Callback required yes/no and why: yes, discuss       Best contact number(s): [phone]      Details to clarify the request: Ijeoma Tejada

## 2021-10-05 NOTE — TELEPHONE ENCOUNTER
Per MD she needs to schedule a virtual visit to discuss & questions can be addressed at the visit. Please assist with scheduling.

## 2021-10-08 NOTE — TELEPHONE ENCOUNTER
Sent [email or web address] message to patient advising to contact me or office to schedule a Virtual Visit per Dr. Xochilt Nunn request.

## 2021-10-14 RX ORDER — METOPROLOL SUCCINATE 50 MG/1
50 TABLET, EXTENDED RELEASE ORAL DAILY
Qty: 90 TABLET | Refills: 1 | Status: SHIPPED | OUTPATIENT
Start: 2021-10-14 | End: 2022-02-09 | Stop reason: SDUPTHER

## 2021-11-05 ENCOUNTER — HOSPITAL ENCOUNTER (OUTPATIENT)
Dept: GENERAL RADIOLOGY | Age: 66
Discharge: HOME | End: 2021-11-05
Payer: COMMERCIAL

## 2021-11-05 ENCOUNTER — TRANSCRIBE ORDER (OUTPATIENT)
Dept: GENERAL RADIOLOGY | Age: 66
End: 2021-11-05

## 2021-11-05 DIAGNOSIS — Z00.5 EXAMINATION OF POTENTIAL DONOR OF ORGAN AND TISSUE: ICD-10-CM

## 2021-11-05 DIAGNOSIS — Z00.5 EXAMINATION OF POTENTIAL DONOR OF ORGAN AND TISSUE: Primary | ICD-10-CM

## 2021-11-05 PROCEDURE — 71046 X-RAY EXAM CHEST 2 VIEWS: CPT

## 2021-11-08 ENCOUNTER — TELEPHONE (OUTPATIENT)
Dept: CARDIOLOGY CLINIC | Age: 66
End: 2021-11-08

## 2021-11-08 NOTE — TELEPHONE ENCOUNTER
Patient calling to schedule an appointment with Dr. Ines Sullivan, this will be a follow up after new medication, please advise      [phone]

## 2021-11-09 NOTE — TELEPHONE ENCOUNTER
Returned patient call and identity verified by two identifiers. Coordinated an appointment for patient to see Dr. Coty Hoyos on 12/10/2021, as requested. Patient verbalized all understanding and had no additional questions.     Future Appointments   Date Time Provider Jose Castellanos   11/16/2021  1:10 PM Marilyn Nice MD [facility] AMB   11/16/2021  2:20 PM TIFFANIE MUSTAFA [facility] AMB   12/10/2021  9:40 AM COLIN Haro AMB

## 2021-11-11 RX ORDER — ATORVASTATIN CALCIUM 20 MG/1
TABLET, FILM COATED ORAL
Qty: 90 TABLET | Refills: 1 | Status: SHIPPED | OUTPATIENT
Start: 2021-11-11 | End: 2022-08-02 | Stop reason: SDUPTHER

## 2021-11-15 NOTE — PROGRESS NOTES
Cardiac Electrophysiology OFFICE Note     Subjective:      Kj Car is a 77 y.o. patient who is seen for evaluation of NSVT and PVC on holter from [facility] (read by Dr Missy Sanchez). Historically, no syncope & overall not particularly symptomatic other than occasional palpitations. Holter in 2020 showed rare PVCs, single episode NSVT x 6 beats. Stress echo in 2021 showed PVCs & occasional couplets with stress; no ischemia or IHSS. Switched from losartan to Toprol XL then. Since starting Toprol XL, she reports symptoms have been well controlled. Rare momentary lightheadedness, single flutter that lasted about a second. BP controlled. States she is part of a kidney donor exchange, plans to undergo donation surgery in [place] in January in 2022. States that hospital has [facility] records system. Previous:  Saw Dr. Elmira Watie in  for HTN, chest pain, esophageal spasm. No family history of SCD. Mother  young from ovarian cancer. Patient Active Problem List   Diagnosis Code    Overweight (BMI 25.0-29. 9) E66.3    Other and unspecified hyperlipidemia E78.5    HTN (hypertension) I10    JOLLY (obstructive sleep apnea) G47.33    Anxiety F41.9    FH: ovarian cancer Z80.41    UMA (stress urinary incontinence, female) N39.3    Gastroesophageal reflux disease without esophagitis K21.9    Palpitations R00.2    BETTS (nonalcoholic steatohepatitis) K75.81    V tach (HCC) I47.2    S/P colonoscopy Z98.890     Current Outpatient Medications   Medication Sig Dispense Refill    CALCIUM PO Take  by mouth daily.  atorvastatin (LIPITOR) 20 mg tablet TAKE 1 TABLET DAILY 90 Tablet 1    metoprolol succinate (TOPROL-XL) 50 mg XL tablet TAKE 1 TABLET BY MOUTH DAILY. 90 Tablet 1    FLUoxetine (PROzac) 10 mg capsule TAKE 1 CAP BY MOUTH DAILY. 30 Capsule 3    cholecalciferol (Vitamin D3) (1000 Units /25 mcg) tablet Take 400 Units by mouth daily.       methylPREDNISolone (MEDROL DOSEPACK) 4 mg tablet Per pack (Patient not taking: Reported on 12/10/2021) 1 Dose Pack 0    Symbicort 160-4.5 mcg/actuation HFAA TAKE 2 PUFFS BY INHALATION TWO TIMES A DAY. (Patient not taking: Reported on 12/10/2021) 1 Inhaler 2    diphenhydrAMINE (BenadryL) 25 mg capsule Take 25 mg by mouth every six (6) hours as needed.  ibuprofen (MOTRIN) 400 mg tablet Take  by mouth every six (6) hours as needed for Pain. (Patient not taking: Reported on 12/10/2021)       Allergies   Allergen Reactions    Apple Angioedema     Raw apples cause lip/eye to swelling  Peaches causes lip/eye swelling      Past Medical History:   Diagnosis Date    Abnormal mammogram of left breast 03/09/2017    left breast mass and poss nipple inversion- needs add views    Fatty liver     GERD (gastroesophageal reflux disease)     H/O diagnostic mammography 03/22/2017    Left breast dx mammogram & ultrasound- benign cysts    H/O mammogram 10/13/2020    Birads 2. Benign.     Headache(784.0)     migraines    History of mammogram 10/7/15; 3/12/18; 11/18/21    Negative; Normal; neg/dense 2    Hx of biopsy 11/18/13    vulvar bx: benign    Hypercholesterolemia     Hypertension     BETTS (nonalcoholic steatohepatitis) 8/7/2020    abd us 5/20    Other ill-defined conditions(799.89)     hx vagal responces     Palpitations 8/7/2020    Stress test and holter dr lopez 2020 normal    Pap smear for cervical cancer screening 03/09/2017; 10/13/20    neg, hpv neg    Premature ventricular beat     S/P colonoscopy 5/10/2021    Dr Sanjuanita Lopez 2019 5 year repeat     Tubulovillous adenoma 2007    Unspecified sleep apnea     NO CPAP    V tach ([address] 5/10/2021    Cardiology dr mark Sheilla Dakins    Vitamin D deficiency 01/2021     Past Surgical History:   Procedure Laterality Date    ENDOSCOPY, COLON, DIAGNOSTIC  8/2010    HX BILATERAL SALPINGO-OOPHORECTOMY  1/8/14    benign    HX CATARACT REMOVAL      bilateral    HX CHOLECYSTECTOMY      HX LAP CHOLECYSTECTOMY      CAT [place].     HX OTHER SURGICAL      Cataract removal - 2001    HX OTHER SURGICAL  2011    EPIDURAL STEROID INJ LOW BACK    HX SALPINGO-OOPHORECTOMY      prophylactic due to family hx ovarian ca     Family History   Problem Relation Age of Onset    Ovarian Cancer Mother     Cancer Mother         ovarian    Dementia Father     Other Father         Insomnia    Diabetes Maternal Grandmother     Cancer Paternal Grandmother         colon    Parkinson's Disease Paternal Grandfather     Diabetes Other     Heart Disease Neg Hx     Hypertension Neg Hx      Social History     Tobacco Use    Smoking status: Never Smoker    Smokeless tobacco: Never Used   Substance Use Topics    Alcohol use: Yes     Alcohol/week: 0.0 standard drinks     Comment: 2 DRINKS PER MONTH        Review of Systems: All other review of systems otherwise negative. Constitutional: Negative for fever, chills, weight loss, malaise/fatigue. HEENT: Negative for nosebleeds, vision changes. Respiratory: Negative for cough, hemoptysis  Cardiovascular: Negative for chest pain, sustained palpitations, no orthopnea, claudication, leg swelling, syncope, and PND. + rare lightheadedness  Gastrointestinal: Negative for nausea, vomiting, diarrhea, blood in stool and melena. Genitourinary: Negative for dysuria, and hematuria. Musculoskeletal: Negative for myalgias, arthralgia. Skin: Negative for rash. Heme: Does not bleed or bruise easily. Neurological: Negative for speech change and focal weakness. Objective:     Visit Vitals  /78 (BP 1 Location: Left arm, BP Patient Position: Sitting, BP Cuff Size: Adult)   Pulse 64   Resp 16   Ht 5' 3\" (1.6 m)   Wt 164 lb (74.4 kg)   SpO2 98%   BMI 29.05 kg/m²        Physical Exam:   Constitutional: Well-developed and well-nourished. No respiratory distress. Head: Normocephalic and atraumatic. Eyes: Pupils are equal, round.   ENT: Hearing grossly normal.  Neck: Supple. No JVD present. Cardiovascular: Normal rate, regular rhythm. Exam reveals no gallop and no friction rub. No murmur heard. Pulmonary/Chest: Effort normal and breath sounds normal. No wheezes. Abdominal: Soft, no tenderness. Musculoskeletal: No edema. Neurological: Alert,oriented. Skin: Skin is warm and dry. Psychiatric: Normal mood and affect. Behavior is normal. Judgment and thought content normal.         Assessment/Plan:     Imaging/Studies:  Stress echo (07/20/2021): Normal.  Occasional PVCs, occasional couplet during stress & recovery. Holter (05/2020): HR , avg 63. No pauses >2 seconds. Total 611 supraventricular events, no SVT. PVCs x 28, one couplet. Single episode NSVT x 6 beats, max rate 279 bpm.        ICD-10-CM ICD-9-CM    1. PVC (premature ventricular contraction)  I49.3 427.69    2. Essential hypertension  I10 401.9    3. Palpitations  R00.2 785.1    4. NSVT (nonsustained ventricular tachycardia) ([facility])  I47.2 427.1         PVCs/NSVT: Noted via holter in 05/2020. No syncope. Rare PVCs & PACs as well. Stress echo was normal, showed no sign of IHSS. Continue Toprol XL as previously prescribed, has controlled palpitations well. HTN: BP controlled. Continue current regimen as previously prescribed. With upcoming kidney donation, she will need to monitor BP regularly thereafter. Discussed whether PCP or our office would manage her HTN, states her PCP tends to refer often. She will call after she returns from postoperative care in [place], will arrange follow up then. No future appointments. Thank you for involving me in this patient's care and please call with further concerns or questions.     DIMITRIOS Marin  [facility] [place]  12/10/21

## 2021-12-10 ENCOUNTER — OFFICE VISIT (OUTPATIENT)
Dept: CARDIOLOGY CLINIC | Age: 66
End: 2021-12-10
Payer: MEDICARE

## 2021-12-10 VITALS
BODY MASS INDEX: 29.06 KG/M2 | HEART RATE: 64 BPM | SYSTOLIC BLOOD PRESSURE: 120 MMHG | OXYGEN SATURATION: 98 % | WEIGHT: 164 LBS | HEIGHT: 63 IN | DIASTOLIC BLOOD PRESSURE: 78 MMHG | RESPIRATION RATE: 16 BRPM

## 2021-12-10 DIAGNOSIS — I47.29 NSVT (NONSUSTAINED VENTRICULAR TACHYCARDIA): ICD-10-CM

## 2021-12-10 DIAGNOSIS — R00.2 PALPITATIONS: ICD-10-CM

## 2021-12-10 DIAGNOSIS — I10 ESSENTIAL HYPERTENSION: ICD-10-CM

## 2021-12-10 DIAGNOSIS — I49.3 PVC (PREMATURE VENTRICULAR CONTRACTION): Primary | ICD-10-CM

## 2021-12-10 PROCEDURE — G8754 DIAS BP LESS 90: HCPCS | Performed by: NURSE PRACTITIONER

## 2021-12-10 PROCEDURE — 1090F PRES/ABSN URINE INCON ASSESS: CPT | Performed by: NURSE PRACTITIONER

## 2021-12-10 PROCEDURE — 3017F COLORECTAL CA SCREEN DOC REV: CPT | Performed by: NURSE PRACTITIONER

## 2021-12-10 PROCEDURE — G9899 SCRN MAM PERF RSLTS DOC: HCPCS | Performed by: NURSE PRACTITIONER

## 2021-12-10 PROCEDURE — G8427 DOCREV CUR MEDS BY ELIG CLIN: HCPCS | Performed by: NURSE PRACTITIONER

## 2021-12-10 PROCEDURE — 1101F PT FALLS ASSESS-DOCD LE1/YR: CPT | Performed by: NURSE PRACTITIONER

## 2021-12-10 PROCEDURE — G8752 SYS BP LESS 140: HCPCS | Performed by: NURSE PRACTITIONER

## 2021-12-10 PROCEDURE — G8419 CALC BMI OUT NRM PARAM NOF/U: HCPCS | Performed by: NURSE PRACTITIONER

## 2021-12-10 PROCEDURE — G8432 DEP SCR NOT DOC, RNG: HCPCS | Performed by: NURSE PRACTITIONER

## 2021-12-10 PROCEDURE — 99214 OFFICE O/P EST MOD 30 MIN: CPT | Performed by: NURSE PRACTITIONER

## 2021-12-10 PROCEDURE — G8536 NO DOC ELDER MAL SCRN: HCPCS | Performed by: NURSE PRACTITIONER

## 2021-12-10 PROCEDURE — G8399 PT W/DXA RESULTS DOCUMENT: HCPCS | Performed by: NURSE PRACTITIONER

## 2021-12-10 PROCEDURE — G0463 HOSPITAL OUTPT CLINIC VISIT: HCPCS | Performed by: NURSE PRACTITIONER

## 2022-01-31 DIAGNOSIS — F41.9 ANXIETY: ICD-10-CM

## 2022-01-31 RX ORDER — FLUOXETINE 10 MG/1
10 CAPSULE ORAL DAILY
Qty: 30 CAPSULE | Refills: 0 | Status: SHIPPED | OUTPATIENT
Start: 2022-01-31 | End: 2022-02-27

## 2022-02-08 NOTE — PROGRESS NOTES
Cardiac Electrophysiology OFFICE Note     Subjective:      Kwabena Stone is a 77 y.o. patient who is seen for management of elevated BP, was previously seen for evaluation of NSVT and PVC on holter from [facility] (read by Dr Elliott Lorenzo). She reports BP has been elevated since her kidney donation. SBP 140s-150s. Occasional headaches. BP elevated today. Historically, no syncope & overall not particularly symptomatic other than occasional palpitations. Holter in 2020 showed rare PVCs, single episode NSVT x 6 beats. Since starting Toprol XL, she reports symptoms have been overall well controlled. Rarely notes abnormal beats. Stress echo in 2021 showed PVCs & occasional couplets with stress; no ischemia or IHSS. Switched from losartan to Toprol XL then. Previous:  Donated kidney as part of a kidney donor exchange in 2022 in [place]. Saw Dr. Millicent Cardoso in  for HTN, chest pain, esophageal spasm. No family history of SCD. Mother  young from ovarian cancer. Patient Active Problem List   Diagnosis Code    Overweight (BMI 25.0-29. 9) E66.3    Other and unspecified hyperlipidemia E78.5    HTN (hypertension) I10    JOLLY (obstructive sleep apnea) G47.33    Anxiety F41.9    FH: ovarian cancer Z80.41    UMA (stress urinary incontinence, female) N39.3    Gastroesophageal reflux disease without esophagitis K21.9    Palpitations R00.2    BETTS (nonalcoholic steatohepatitis) K75.81    V tach (HCC) I47.2    S/P colonoscopy Z98.890     Current Outpatient Medications   Medication Sig Dispense Refill    metoprolol succinate (TOPROL-XL) 50 mg XL tablet Take 1.5 Tablets by mouth daily. 135 Tablet 2    FLUoxetine (PROzac) 10 mg capsule TAKE 1 CAP BY MOUTH DAILY. 30 Capsule 0    CALCIUM PO Take  by mouth daily.  atorvastatin (LIPITOR) 20 mg tablet TAKE 1 TABLET DAILY 90 Tablet 1    cholecalciferol (Vitamin D3) (1000 Units /25 mcg) tablet Take 400 Units by mouth daily.       diphenhydrAMINE (BenadryL) 25 mg capsule Take 25 mg by mouth every six (6) hours as needed.  ASCORBIC ACID, VITAMIN C, PO Take 1 Tablet by mouth daily. Allergies   Allergen Reactions    Apple Angioedema     Raw apples cause lip/eye to swelling  Peaches causes lip/eye swelling      Past Medical History:   Diagnosis Date    Abnormal mammogram of left breast 03/09/2017    left breast mass and poss nipple inversion- needs add views    Fatty liver     GERD (gastroesophageal reflux disease)     H/O diagnostic mammography 03/22/2017    Left breast dx mammogram & ultrasound- benign cysts    H/O mammogram 10/13/2020    Birads 2. Benign.     Headache(784.0)     migraines    History of mammogram 10/7/15; 3/12/18; 11/18/21    Negative; Normal; neg/dense 2    Hx of biopsy 11/18/13    vulvar bx: benign    Hypercholesterolemia     Hypertension     BETTS (nonalcoholic steatohepatitis) 8/7/2020    abd us 5/20    Other ill-defined conditions(799.89)     hx vagal responces     Palpitations 8/7/2020    Stress test and holter dr lopez 2020 normal    Pap smear for cervical cancer screening 03/09/2017; 10/13/20    neg, hpv neg    Premature ventricular beat     S/P colonoscopy 5/10/2021    Dr Edson Ramos 2019 5 year repeat     Tubulovillous adenoma 2007    Unspecified sleep apnea     NO CPAP    V tach ([address] 5/10/2021    Cardiology dr lenny Govea    Vitamin D deficiency 01/2021     Past Surgical History:   Procedure Laterality Date    ENDOSCOPY, COLON, DIAGNOSTIC  8/2010    HX BILATERAL SALPINGO-OOPHORECTOMY  1/8/14    benign    HX CATARACT REMOVAL      bilateral    HX CHOLECYSTECTOMY      HX LAP CHOLECYSTECTOMY      CAT [place].     HX NEPHRECTOMY  01/2022    Donated kidney    HX OTHER SURGICAL      Cataract removal - 2001    HX OTHER SURGICAL  2011    EPIDURAL STEROID INJ LOW BACK    HX SALPINGO-OOPHORECTOMY      prophylactic due to family hx ovarian ca     Family History   Problem Relation Age of Onset    Ovarian Cancer Mother     Cancer Mother         ovarian    Dementia Father     Other Father         Insomnia    Diabetes Maternal Grandmother     Cancer Paternal Grandmother         colon    Parkinson's Disease Paternal Grandfather     Diabetes Other     Heart Disease Neg Hx     Hypertension Neg Hx      Social History     Tobacco Use    Smoking status: Never Smoker    Smokeless tobacco: Never Used   Substance Use Topics    Alcohol use: Yes     Alcohol/week: 0.0 standard drinks     Comment: 2 DRINKS PER MONTH        Review of Systems: All other review of systems otherwise negative. Constitutional: Negative for fever, chills, weight loss, malaise/fatigue. HEENT: Negative for nosebleeds, vision changes. + occasional headaches  Respiratory: Negative for cough, hemoptysis  Cardiovascular: Negative for chest pain, sustained palpitations, no orthopnea, claudication, leg swelling, syncope, and PND. + rare lightheadedness  Gastrointestinal: Negative for nausea, vomiting, diarrhea, blood in stool and melena. Genitourinary: Negative for dysuria, and hematuria. Musculoskeletal: Negative for myalgias, arthralgia. Skin: Negative for rash. Heme: Does not bleed or bruise easily. Neurological: Negative for speech change and focal weakness. Objective:     Visit Vitals  BP (!) 140/86   Pulse 71   Resp 16   Ht 5' 3\" (1.6 m)   Wt 161 lb (73 kg)   SpO2 100%   BMI 28.52 kg/m²        Physical Exam:   Constitutional: Well-developed and well-nourished. No respiratory distress. Head: Normocephalic and atraumatic. Eyes: Pupils are equal, round. ENT: Hearing grossly normal.  Neck: Supple. No JVD present. Cardiovascular: Normal rate, regular rhythm. Exam reveals no gallop and no friction rub. No murmur heard. Pulmonary/Chest: Effort normal and breath sounds normal. No wheezes. Abdominal: Soft, no tenderness. Musculoskeletal: No edema. Neurological: Alert,oriented.    Skin: Skin is warm and dry.  Psychiatric: Normal mood and affect. Behavior is normal. Judgment and thought content normal.         Assessment/Plan:     Imaging/Studies:  Stress echo (07/20/2021): Normal.  Occasional PVCs, occasional couplet during stress & recovery. Holter (05/2020): HR , avg 63. No pauses >2 seconds. Total 611 supraventricular events, no SVT. PVCs x 28, one couplet. Single episode NSVT x 6 beats, max rate 279 bpm.        ICD-10-CM ICD-9-CM    1. Essential hypertension  I10 401.9    2. PVC (premature ventricular contraction)  I49.3 427.69    3. NSVT (nonsustained ventricular tachycardia) ([facility])  I47.2 427.1        HTN: BP elevated since kidney donation. Increase Toprol XL to 75 mg po daily. She will monitor BP 3 times weekly for the next 2 weeks, then send us a [employer] message to report. Goal BP <130/80. PVCs/NSVT: Noted via holter in 05/2020. No syncope. Rare PVCs & PACs as well. Stress echo was normal, showed no sign of IHSS. Continue Toprol XL as previously prescribed, has controlled palpitations well. Follow up in EP clinic in 1 year, states her PCP typically refers out for management. Future Appointments   Date Time Provider Jose Castellanos   2/9/2023  1:00 PM MD CARISSA Spears  AMB        Thank you for involving me in this patient's care and please call with further concerns or questions.     DIMITRIOS Painting  [facility] [place]  02/09/22 aching

## 2022-02-09 ENCOUNTER — OFFICE VISIT (OUTPATIENT)
Dept: CARDIOLOGY CLINIC | Age: 67
End: 2022-02-09
Payer: MEDICARE

## 2022-02-09 VITALS
WEIGHT: 161 LBS | DIASTOLIC BLOOD PRESSURE: 86 MMHG | HEART RATE: 71 BPM | OXYGEN SATURATION: 100 % | BODY MASS INDEX: 28.53 KG/M2 | RESPIRATION RATE: 16 BRPM | SYSTOLIC BLOOD PRESSURE: 140 MMHG | HEIGHT: 63 IN

## 2022-02-09 DIAGNOSIS — I10 ESSENTIAL HYPERTENSION: Primary | ICD-10-CM

## 2022-02-09 DIAGNOSIS — I49.3 PVC (PREMATURE VENTRICULAR CONTRACTION): ICD-10-CM

## 2022-02-09 DIAGNOSIS — I47.29 NSVT (NONSUSTAINED VENTRICULAR TACHYCARDIA): ICD-10-CM

## 2022-02-09 PROCEDURE — G8754 DIAS BP LESS 90: HCPCS | Performed by: NURSE PRACTITIONER

## 2022-02-09 PROCEDURE — G8432 DEP SCR NOT DOC, RNG: HCPCS | Performed by: NURSE PRACTITIONER

## 2022-02-09 PROCEDURE — G0463 HOSPITAL OUTPT CLINIC VISIT: HCPCS | Performed by: NURSE PRACTITIONER

## 2022-02-09 PROCEDURE — 99214 OFFICE O/P EST MOD 30 MIN: CPT | Performed by: NURSE PRACTITIONER

## 2022-02-09 PROCEDURE — G9899 SCRN MAM PERF RSLTS DOC: HCPCS | Performed by: NURSE PRACTITIONER

## 2022-02-09 PROCEDURE — 3017F COLORECTAL CA SCREEN DOC REV: CPT | Performed by: NURSE PRACTITIONER

## 2022-02-09 PROCEDURE — G8399 PT W/DXA RESULTS DOCUMENT: HCPCS | Performed by: NURSE PRACTITIONER

## 2022-02-09 PROCEDURE — 1090F PRES/ABSN URINE INCON ASSESS: CPT | Performed by: NURSE PRACTITIONER

## 2022-02-09 PROCEDURE — G8753 SYS BP > OR = 140: HCPCS | Performed by: NURSE PRACTITIONER

## 2022-02-09 PROCEDURE — G8419 CALC BMI OUT NRM PARAM NOF/U: HCPCS | Performed by: NURSE PRACTITIONER

## 2022-02-09 PROCEDURE — 1101F PT FALLS ASSESS-DOCD LE1/YR: CPT | Performed by: NURSE PRACTITIONER

## 2022-02-09 PROCEDURE — G8536 NO DOC ELDER MAL SCRN: HCPCS | Performed by: NURSE PRACTITIONER

## 2022-02-09 PROCEDURE — G8427 DOCREV CUR MEDS BY ELIG CLIN: HCPCS | Performed by: NURSE PRACTITIONER

## 2022-02-09 RX ORDER — METOPROLOL SUCCINATE 50 MG/1
75 TABLET, EXTENDED RELEASE ORAL DAILY
Qty: 135 TABLET | Refills: 2 | Status: SHIPPED | OUTPATIENT
Start: 2022-02-09 | End: 2022-10-21

## 2022-02-09 NOTE — PATIENT INSTRUCTIONS
Increase Toprol XL to 75 mg daily. Monitor blood pressure three times a week for the next two weeks, and then send them to us in a [employer] message. Goal BP is less than 130/80. Follow up with Dr. Nabila Lopez in one year.

## 2022-02-24 NOTE — TELEPHONE ENCOUNTER
Reached out to patient to assist w/ scheduling a medication evaluation / follow up appointment per PCP.  No answer left V/M & sent My Chart message - thank you

## 2022-02-26 DIAGNOSIS — F41.9 ANXIETY: ICD-10-CM

## 2022-02-27 RX ORDER — FLUOXETINE 10 MG/1
10 CAPSULE ORAL DAILY
Qty: 30 CAPSULE | Refills: 0 | Status: SHIPPED | OUTPATIENT
Start: 2022-02-27 | End: 2022-03-10 | Stop reason: SDUPTHER

## 2022-03-01 ENCOUNTER — PATIENT MESSAGE (OUTPATIENT)
Dept: CARDIOLOGY CLINIC | Age: 67
End: 2022-03-01

## 2022-03-10 ENCOUNTER — OFFICE VISIT (OUTPATIENT)
Dept: INTERNAL MEDICINE CLINIC | Age: 67
End: 2022-03-10
Payer: MEDICARE

## 2022-03-10 VITALS
HEART RATE: 70 BPM | BODY MASS INDEX: 29.2 KG/M2 | WEIGHT: 164.8 LBS | RESPIRATION RATE: 16 BRPM | HEIGHT: 63 IN | OXYGEN SATURATION: 95 % | SYSTOLIC BLOOD PRESSURE: 118 MMHG | DIASTOLIC BLOOD PRESSURE: 72 MMHG

## 2022-03-10 DIAGNOSIS — F41.9 ANXIETY: ICD-10-CM

## 2022-03-10 DIAGNOSIS — R73.01 IFG (IMPAIRED FASTING GLUCOSE): ICD-10-CM

## 2022-03-10 DIAGNOSIS — Z90.5 H/O LEFT NEPHRECTOMY: ICD-10-CM

## 2022-03-10 DIAGNOSIS — I10 ESSENTIAL HYPERTENSION: Primary | ICD-10-CM

## 2022-03-10 DIAGNOSIS — E78.5 DYSLIPIDEMIA, GOAL LDL BELOW 100: ICD-10-CM

## 2022-03-10 PROBLEM — R00.2 PALPITATIONS: Status: RESOLVED | Noted: 2020-08-07 | Resolved: 2022-03-10

## 2022-03-10 LAB
ALBUMIN SERPL-MCNC: 4.1 G/DL (ref 3.5–5)
ALBUMIN/GLOB SERPL: 1.2 {RATIO} (ref 1.1–2.2)
ALP SERPL-CCNC: 84 U/L (ref 45–117)
ALT SERPL-CCNC: 31 U/L (ref 12–78)
ANION GAP SERPL CALC-SCNC: 4 MMOL/L (ref 5–15)
AST SERPL-CCNC: 17 U/L (ref 15–37)
BILIRUB SERPL-MCNC: 1.2 MG/DL (ref 0.2–1)
BUN SERPL-MCNC: 15 MG/DL (ref 6–20)
BUN/CREAT SERPL: 13 (ref 12–20)
CALCIUM SERPL-MCNC: 10.2 MG/DL (ref 8.5–10.1)
CHLORIDE SERPL-SCNC: 105 MMOL/L (ref 97–108)
CHOLEST SERPL-MCNC: 166 MG/DL
CO2 SERPL-SCNC: 29 MMOL/L (ref 21–32)
CREAT SERPL-MCNC: 1.17 MG/DL (ref 0.55–1.02)
EST. AVERAGE GLUCOSE BLD GHB EST-MCNC: 111 MG/DL
GLOBULIN SER CALC-MCNC: 3.5 G/DL (ref 2–4)
GLUCOSE SERPL-MCNC: 99 MG/DL (ref 65–100)
HBA1C MFR BLD: 5.5 % (ref 4–5.6)
HCV AB SERPL QL IA: NONREACTIVE
HDLC SERPL-MCNC: 57 MG/DL
HDLC SERPL: 2.9 {RATIO} (ref 0–5)
LDLC SERPL CALC-MCNC: 69.4 MG/DL (ref 0–100)
POTASSIUM SERPL-SCNC: 4.3 MMOL/L (ref 3.5–5.1)
PROT SERPL-MCNC: 7.6 G/DL (ref 6.4–8.2)
SODIUM SERPL-SCNC: 138 MMOL/L (ref 136–145)
TRIGL SERPL-MCNC: 198 MG/DL (ref ?–150)
VLDLC SERPL CALC-MCNC: 39.6 MG/DL

## 2022-03-10 PROCEDURE — G8432 DEP SCR NOT DOC, RNG: HCPCS | Performed by: INTERNAL MEDICINE

## 2022-03-10 PROCEDURE — G8536 NO DOC ELDER MAL SCRN: HCPCS | Performed by: INTERNAL MEDICINE

## 2022-03-10 PROCEDURE — G8427 DOCREV CUR MEDS BY ELIG CLIN: HCPCS | Performed by: INTERNAL MEDICINE

## 2022-03-10 PROCEDURE — G8752 SYS BP LESS 140: HCPCS | Performed by: INTERNAL MEDICINE

## 2022-03-10 PROCEDURE — 1101F PT FALLS ASSESS-DOCD LE1/YR: CPT | Performed by: INTERNAL MEDICINE

## 2022-03-10 PROCEDURE — G0463 HOSPITAL OUTPT CLINIC VISIT: HCPCS | Performed by: INTERNAL MEDICINE

## 2022-03-10 PROCEDURE — G9899 SCRN MAM PERF RSLTS DOC: HCPCS | Performed by: INTERNAL MEDICINE

## 2022-03-10 PROCEDURE — 99214 OFFICE O/P EST MOD 30 MIN: CPT | Performed by: INTERNAL MEDICINE

## 2022-03-10 PROCEDURE — 3017F COLORECTAL CA SCREEN DOC REV: CPT | Performed by: INTERNAL MEDICINE

## 2022-03-10 PROCEDURE — G8399 PT W/DXA RESULTS DOCUMENT: HCPCS | Performed by: INTERNAL MEDICINE

## 2022-03-10 PROCEDURE — 1090F PRES/ABSN URINE INCON ASSESS: CPT | Performed by: INTERNAL MEDICINE

## 2022-03-10 PROCEDURE — G8419 CALC BMI OUT NRM PARAM NOF/U: HCPCS | Performed by: INTERNAL MEDICINE

## 2022-03-10 PROCEDURE — G8754 DIAS BP LESS 90: HCPCS | Performed by: INTERNAL MEDICINE

## 2022-03-10 RX ORDER — FLUOXETINE 10 MG/1
10 CAPSULE ORAL DAILY
Qty: 90 CAPSULE | Refills: 1 | Status: SHIPPED | OUTPATIENT
Start: 2022-03-10 | End: 2022-06-18

## 2022-03-10 NOTE — PROGRESS NOTES
HISTORY OF PRESENT ILLNESS  Shelly Umana is a 77 y.o. female. HPI  Since our last visit she has donated her left kidney on January 12th in [place]. Surgery went well. She notes she had corneal abrasion postop, but this resolved. She has had no abdominal symptoms. She has been advised to avoid NSAIDs. Chronic insomnia. Goes to sleep at 2:00 and then sleeps till 9:00. Discussed sleep hygiene, starting with bedtime at 1:30 and bedtime rituals. Discussed using a half of a Trazodone, which she still has. Avoid sedative hypnotics. Anxiety, stable on Fluoxetine 10. Feels it takes the edge off. Will continue. Dyslipidemia, on Atorvastatin 20. No myalgias. Due for lipids. Worried about her glucose. We will check A1c. Review of Systems   Constitutional: Positive for malaise/fatigue. Negative for chills, diaphoresis, fever and weight loss. Respiratory: Negative for cough, shortness of breath and wheezing. Cardiovascular: Negative for chest pain, palpitations, orthopnea, leg swelling and PND. Gastrointestinal: Negative for abdominal pain, diarrhea, heartburn and nausea. Musculoskeletal: Negative for myalgias. Neurological: Negative for dizziness and headaches. Psychiatric/Behavioral: Negative for depression. The patient has insomnia. Physical Exam  Vitals and nursing note reviewed. Constitutional:       Appearance: She is well-developed. HENT:      Head: Normocephalic and atraumatic. Neck:      Thyroid: No thyromegaly. Vascular: No carotid bruit. Cardiovascular:      Rate and Rhythm: Normal rate and regular rhythm. Heart sounds: Normal heart sounds, S1 normal and S2 normal. No murmur heard. Pulmonary:      Effort: Pulmonary effort is normal. No respiratory distress. Breath sounds: Normal breath sounds. No wheezing or rales. Abdominal:      General: There is no distension. Palpations: Abdomen is soft. There is no mass. Tenderness:  There is no abdominal tenderness. Musculoskeletal:      Cervical back: Normal range of motion and neck supple. Neurological:      Mental Status: She is alert and oriented to person, place, and time. Psychiatric:         Behavior: Behavior normal.         ASSESSMENT and PLAN  Diagnoses and all orders for this visit:    1. Essential hypertension-stable on current dose    2. Dyslipidemia, goal LDL below 298  -     METABOLIC PANEL, COMPREHENSIVE; Future  -     LIPID PANEL; Future  -     HEPATITIS C AB; Future    3. H/O left nephrectomy    4. IFG (impaired fasting glucose)  -     HEMOGLOBIN A1C WITH EAG; Future    5. Anxiety  -     FLUoxetine (PROzac) 10 mg capsule; Take 1 Capsule by mouth daily.       the following changes in treatment are made: change timing of bedtime to 1:30 and avoid stimulants at night  Ok to  Use 25 mg trazodone 30 min  Prior to bed  appt in 6mo

## 2022-03-16 ENCOUNTER — TRANSCRIBE ORDER (OUTPATIENT)
Dept: SCHEDULING | Age: 67
End: 2022-03-16

## 2022-03-16 DIAGNOSIS — M54.16 RADICULOPATHY OF LUMBAR REGION: Primary | ICD-10-CM

## 2022-03-19 PROBLEM — K75.81 NASH (NONALCOHOLIC STEATOHEPATITIS): Status: ACTIVE | Noted: 2020-08-07

## 2022-03-19 PROBLEM — Z98.890 S/P COLONOSCOPY: Status: ACTIVE | Noted: 2021-05-10

## 2022-03-19 PROBLEM — Z90.5 H/O LEFT NEPHRECTOMY: Status: ACTIVE | Noted: 2022-03-10

## 2022-03-20 PROBLEM — I47.20 V TACH (HCC): Status: ACTIVE | Noted: 2021-05-10

## 2022-03-22 ENCOUNTER — TRANSCRIBE ORDER (OUTPATIENT)
Dept: SCHEDULING | Age: 67
End: 2022-03-22

## 2022-03-22 DIAGNOSIS — M54.16 LUMBAR RADICULOPATHY: Primary | ICD-10-CM

## 2022-03-23 ENCOUNTER — PATIENT MESSAGE (OUTPATIENT)
Dept: OBGYN CLINIC | Age: 67
End: 2022-03-23

## 2022-03-25 ENCOUNTER — OFFICE VISIT (OUTPATIENT)
Dept: OBGYN CLINIC | Age: 67
End: 2022-03-25
Payer: MEDICARE

## 2022-03-25 VITALS
HEART RATE: 67 BPM | HEIGHT: 63 IN | BODY MASS INDEX: 29.52 KG/M2 | WEIGHT: 166.6 LBS | SYSTOLIC BLOOD PRESSURE: 125 MMHG | DIASTOLIC BLOOD PRESSURE: 73 MMHG

## 2022-03-25 DIAGNOSIS — Z01.419 ENCOUNTER FOR GYNECOLOGICAL EXAMINATION (GENERAL) (ROUTINE) WITHOUT ABNORMAL FINDINGS: Primary | ICD-10-CM

## 2022-03-25 PROCEDURE — G8419 CALC BMI OUT NRM PARAM NOF/U: HCPCS | Performed by: OBSTETRICS & GYNECOLOGY

## 2022-03-25 PROCEDURE — 3017F COLORECTAL CA SCREEN DOC REV: CPT | Performed by: OBSTETRICS & GYNECOLOGY

## 2022-03-25 PROCEDURE — 1101F PT FALLS ASSESS-DOCD LE1/YR: CPT | Performed by: OBSTETRICS & GYNECOLOGY

## 2022-03-25 PROCEDURE — G0101 CA SCREEN;PELVIC/BREAST EXAM: HCPCS | Performed by: OBSTETRICS & GYNECOLOGY

## 2022-03-25 PROCEDURE — G8752 SYS BP LESS 140: HCPCS | Performed by: OBSTETRICS & GYNECOLOGY

## 2022-03-25 PROCEDURE — 1090F PRES/ABSN URINE INCON ASSESS: CPT | Performed by: OBSTETRICS & GYNECOLOGY

## 2022-03-25 PROCEDURE — G8754 DIAS BP LESS 90: HCPCS | Performed by: OBSTETRICS & GYNECOLOGY

## 2022-03-25 PROCEDURE — G8432 DEP SCR NOT DOC, RNG: HCPCS | Performed by: OBSTETRICS & GYNECOLOGY

## 2022-03-25 PROCEDURE — G9899 SCRN MAM PERF RSLTS DOC: HCPCS | Performed by: OBSTETRICS & GYNECOLOGY

## 2022-03-25 RX ORDER — CYCLOBENZAPRINE HCL 5 MG
5 TABLET ORAL
COMMUNITY
Start: 2022-03-10 | End: 2022-08-02 | Stop reason: SDUPTHER

## 2022-03-25 NOTE — PROGRESS NOTES
[facility]  [email or web address]  [phone]    Marlen Lerner MD, [facility]       Annual Gynecologic Exam:   WWE 60+  Chief Complaint   Patient presents with    Well Woman         Clark Colon is a 77 y.o. No obstetric history on file. WHITE/NON-  female who presents for an annual exam.    She reports additional concerns: no concerns. ABN medicare. denies vaginal dryness; reports low libido. Sexual history and Contraception:  Social History     Substance and Sexual Activity   Sexual Activity Yes    Partners: Male       Preventive Medicine History:  Her most recent Pap smear result: normal was obtained in October 2020  Her most recent HR HPV screen was not obtained in 2020    She does not have a history of GALILEA 2, 3 or cervical cancer. Breast health:  Last mammogram: approximate date 10/2020 and was normal.   Breast cancer family updated: see FH. Bone health: Stevenson Thomas She has had a bone density scan in the past. 5/2021  Last bone density test results: abnormal findings osteopenia. She does have a history of osteopenia/osteoporosis. Osteoporosis family history updated: see FH. Past Medical History:   Diagnosis Date    Abnormal mammogram of left breast 03/09/2017    left breast mass and poss nipple inversion- needs add views    Fatty liver     GERD (gastroesophageal reflux disease)     H/O diagnostic mammography 03/22/2017    Left breast dx mammogram & ultrasound- benign cysts    H/O left nephrectomy 3/10/2022    1/12/22 to  Donate kidney in [place] H/O mammogram 10/13/2020    Birads 2. Benign.     Headache(784.0)     migraines    History of mammogram 10/7/15; 3/12/18; 11/18/21    Negative; Normal; neg/dense 2    Hx of biopsy 11/18/13    vulvar bx: benign    Hx of kidney removal 01/12/2022    Donated kidney     Hypercholesterolemia     Hypertension     BETTS (nonalcoholic steatohepatitis) 8/7/2020    abd us 5/20    Other ill-defined conditions(799.89)     hx vagal responces     Palpitations 8/7/2020    Stress test and holter dr lopez 2020 normal    Pap smear for cervical cancer screening 03/09/2017; 10/13/20    neg, hpv neg    Premature ventricular beat     S/P colonoscopy 5/10/2021    Dr Sai Mccormack 2019 5 year repeat     Tubulovillous adenoma 2007    Unspecified sleep apnea     NO CPAP    V tach ([address] 5/10/2021    Cardiology dr lenny Hoang    Vitamin D deficiency 01/2021     Past Surgical History:   Procedure Laterality Date    ENDOSCOPY, COLON, DIAGNOSTIC  8/2010    HX BILATERAL SALPINGO-OOPHORECTOMY  1/8/14    benign    HX CATARACT REMOVAL      bilateral    HX CHOLECYSTECTOMY      HX LAP CHOLECYSTECTOMY      CAT Pace.     HX NEPHRECTOMY  01/2022    Donated kidney    HX OTHER SURGICAL      Cataract removal - 2001    HX OTHER SURGICAL  2011    EPIDURAL STEROID INJ LOW BACK    HX SALPINGO-OOPHORECTOMY      prophylactic due to family hx ovarian ca     Family History   Problem Relation Age of Onset    Ovarian Cancer Mother     Cancer Mother         ovarian    Dementia Father     Other Father         Insomnia    Diabetes Maternal Grandmother     Cancer Paternal Grandmother         colon    Parkinson's Disease Paternal Grandfather     Diabetes Other     Heart Disease Neg Hx     Hypertension Neg Hx      Social History     Socioeconomic History    Marital status:      Spouse name: Not on file    Number of children: Not on file    Years of education: Not on file    Highest education level: Not on file   Occupational History    Not on file   Tobacco Use    Smoking status: Never Smoker    Smokeless tobacco: Never Used   Vaping Use    Vaping Use: Never used   Substance and Sexual Activity    Alcohol use:  Yes     Alcohol/week: 0.0 - 1.0 standard drinks     Comment: 2 DRINKS PER MONTH    Drug use: No    Sexual activity: Yes     Partners: Male   Other Topics Concern    Not on file   Social History Narrative  Not on file     Social Determinants of Health     Financial Resource Strain:     Difficulty of Paying Living Expenses: Not on file   Food Insecurity:     Worried About Running Out of Food in the Last Year: Not on file    Leon of Food in the Last Year: Not on file   Transportation Needs:     Lack of Transportation (Medical): Not on file    Lack of Transportation (Non-Medical): Not on file   Physical Activity:     Days of Exercise per Week: Not on file    Minutes of Exercise per Session: Not on file   Stress:     Feeling of Stress : Not on file   Social Connections:     Frequency of Communication with Friends and Family: Not on file    Frequency of Social Gatherings with Friends and Family: Not on file    Attends Buddhism Services: Not on file    Active Member of [address] [place] [address] or Organizations: Not on file    Attends Club or Organization Meetings: Not on file    Marital Status: Not on file   Intimate Partner Violence:     Fear of Current or Ex-Partner: Not on file    Emotionally Abused: Not on file    Physically Abused: Not on file    Sexually Abused: Not on file   Housing Stability:     Unable to Pay for Housing in the Last Year: Not on file    Number of [place] in the Last Year: Not on file    Unstable Housing in the Last Year: Not on file       Allergies   Allergen Reactions    Apple Angioedema     Raw apples cause lip/eye to swelling  Peaches causes lip/eye swelling        Current Outpatient Medications   Medication Sig    cyclobenzaprine (FLEXERIL) 5 mg tablet TAKE 1 TABLET EVERY DAY BY ORAL ROUTE AT BEDTIME FOR 30 DAYS.  FLUoxetine (PROzac) 10 mg capsule Take 1 Capsule by mouth daily.  ASCORBIC ACID, VITAMIN C, PO Take 1 Tablet by mouth daily.  metoprolol succinate (TOPROL-XL) 50 mg XL tablet Take 1.5 Tablets by mouth daily.  CALCIUM PO Take  by mouth daily.     atorvastatin (LIPITOR) 20 mg tablet TAKE 1 TABLET DAILY    cholecalciferol (Vitamin D3) (1000 Units /25 mcg) tablet Take 400 Units by mouth daily.  diphenhydrAMINE (BenadryL) 25 mg capsule Take 25 mg by mouth every six (6) hours as needed. No current facility-administered medications for this visit. Patient Active Problem List   Diagnosis Code    Overweight (BMI 25.0-29. 9) E66.3    Other and unspecified hyperlipidemia E78.5    HTN (hypertension) I10    JOLLY (obstructive sleep apnea) G47.33    Anxiety F41.9    FH: ovarian cancer Z80.41    UMA (stress urinary incontinence, female) N39.3    Gastroesophageal reflux disease without esophagitis K21.9    BETTS (nonalcoholic steatohepatitis) K75.81    V tach (HCC) I47.2    S/P colonoscopy Z98.890    H/O left nephrectomy Z90.5       Review of Systems - History obtained from the patient and patient filled out questionnaire   Constitutional/general, HEENT, CV, Resp, GI, MSK, Neuro, Psych, Heme/lymph, Skin, Breast ROS: no significant complaints except as noted on HPI      Physical Exam  Visit Vitals  /73   Pulse 67   Ht 5' 3\" (1.6 m)   Wt 166 lb 9.6 oz (75.6 kg)   BMI 29.51 kg/m²       Constitutional  · Appearance: well-nourished, well developed, alert, in no acute distress    HENT  · Head and Face: appears normal    Neck  · Inspection/Palpation: normal appearance, no masses or tenderness  · Lymph Nodes: no lymphadenopathy present  · Thyroid: gland size normal, nontender, no nodules or masses present on palpation    Chest  · Respiratory Effort: breathing unlabored  · Auscultation: normal breath sounds    Cardiovascular  · Heart:  · Auscultation: regular rate and rhythm without murmur    Breasts  · Inspection of Breasts: breasts symmetrical, no skin changes, no discharge present, nipple appearance normal, no skin retraction present  · Palpation of Breasts and Axillae: no masses present on palpation, no breast tenderness  · Axillary Lymph Nodes: no lymphadenopathy present    Gastrointestinal  · Abdominal Examination: abdomen non-tender to palpation, normal bowel sounds, no masses present  · Liver and spleen: no hepatomegaly present, spleen not palpable  · Hernias: no hernias identified    Genitourinary  · External Genitalia: normal appearance for age, no discharge present, no tenderness present, no inflammatory lesions present, no masses present, with ,o;d atrophy present  · Vagina: normal vaginal vault without central or paravaginal defects, no discharge present, no inflammatory lesions present, no masses present  · Bladder: non-tender to palpation  · Urethra: appears normal  · Cervix: normal   · Uterus: normal size, shape and consistency  · Adnexa: no adnexal tenderness present, no adnexal masses present  · Perineum: perineum within normal limits, no evidence of trauma, no rashes or skin lesions present  · Anus: anus within normal limits, no hemorrhoids present  · Inguinal Lymph Nodes: no lymphadenopathy present    Skin  · General Inspection: no rash, no lesions identified    Neurologic/Psychiatric  · Mental Status:  · Orientation: grossly oriented to person, place and time  · Mood and Affect: mood normal, affect appropriate    Assessment:  77 y.o. No obstetric history on file. for well woman exam  Encounter Diagnosis   Name Primary?  Encounter for gynecological examination (general) (routine) without abnormal findings Yes       Plan:  The patient was counseled about diet, exercise, healthy lifestyle  We discussed current self breast exam and mammogram recommendations  We discussed current pap smear and HR HPV testing guidelines  I recommended follow up in one year for routine annual gynecologic exam or sooner if needed  I recommended follow up with a primary care physician for any chronic medical problems or non-gynecologic concerns    We discussed calcium/vitamin D/weight bearing exercise and osteoporosis prevention and bone density screening recommendations.  Ho given  Handouts were given to the patient       Folllow up:  [x] return for annual well woman exam in one year or sooner if she is having problems  [] follow up and ultrasound  [] mammogram  [] 6 months  [] 6 weeks   []     No orders of the defined types were placed in this encounter. No results found for any visits on 03/25/22.

## 2022-03-25 NOTE — PATIENT INSTRUCTIONS
Well Visit, Over 72: Care Instructions  Overview     Well visits can help you stay healthy. Your doctor has checked your overall health and may have suggested ways to take good care of yourself. Your doctor also may have recommended tests. At home, you can help prevent illness with healthy eating, regular exercise, and other steps. Follow-up care is a key part of your treatment and safety. Be sure to make and go to all appointments, and call your doctor if you are having problems. It's also a good idea to know your test results and keep a list of the medicines you take. How can you care for yourself at home? · Get screening tests that you and your doctor decide on. Screening helps find diseases before any symptoms appear. · Eat healthy foods. Choose fruits, vegetables, whole grains, protein, and low-fat dairy foods. Limit fat, especially saturated fat. Reduce salt in your diet. · Limit alcohol. If you are a man, have no more than 2 drinks a day or 14 drinks a week. If you are a woman, have no more than 1 drink a day or 7 drinks a week. Since alcohol affects older adults differently, you may want to limit alcohol even more. Or you may not want to drink at all. · Get at least 30 minutes of exercise on most days of the week. Walking is a good choice. You also may want to do other activities, such as running, swimming, cycling, or playing tennis or team sports. · Reach and stay at a healthy weight. This will lower your risk for many problems, such as obesity, diabetes, heart disease, and high blood pressure. · Do not smoke. Smoking can make health problems worse. If you need help quitting, talk to your doctor about stop-smoking programs and medicines. These can increase your chances of quitting for good. · Care for your mental health. It is easy to get weighed down by worry and stress. Learn strategies to manage stress, like deep breathing and mindfulness, and stay connected with your family and community. If you find you often feel sad or hopeless, talk with your doctor. Treatment can help. · Talk to your doctor about whether you have any risk factors for sexually transmitted infections (STIs). You can help prevent STIs if you wait to have sex with a new partner (or partners) until you've each been tested for STIs. It also helps if you use condoms (male or female condoms) and if you limit your sex partners to one person who only has sex with you. Vaccines are available for some STIs. · If you think you may have a problem with alcohol or drug use, talk to your doctor. This includes prescription medicines (such as amphetamines and opioids) and illegal drugs (such as cocaine and methamphetamine). Your doctor can help you figure out what type of treatment is best for you. · Protect your skin from too much sun. When you're outdoors from 10 a.m. to 4 p.m., stay in the shade or cover up with clothing and a hat with a wide brim. Wear sunglasses that block UV rays. Even when it's cloudy, put broad-spectrum sunscreen (SPF 30 or higher) on any exposed skin. · See a dentist one or two times a year for checkups and to have your teeth cleaned. · Wear a seat belt in the car. When should you call for help? Watch closely for changes in your health, and be sure to contact your doctor if you have any problems or symptoms that concern you. Where can you learn more? Go to http://www.gray.com/  Enter [id number] in the search box to learn more about \"Well Visit, Over 65: Care Instructions. \"  Current as of: October 6, 2021               Content Version: 13.2  © 6300-0661 [employer], Incorporated. Care instructions adapted under license by [employer] (which disclaims liability or warranty for this information).  If you have questions about a medical condition or this instruction, always ask your healthcare professional. [address] any warranty or liability for your use of this information.

## 2022-03-27 ENCOUNTER — HOSPITAL ENCOUNTER (OUTPATIENT)
Dept: MRI IMAGING | Age: 67
Discharge: HOME | End: 2022-03-27
Payer: MEDICARE

## 2022-03-27 DIAGNOSIS — M54.16 LUMBAR RADICULOPATHY: ICD-10-CM

## 2022-03-27 PROCEDURE — 72148 MRI LUMBAR SPINE W/O DYE: CPT

## 2022-06-18 DIAGNOSIS — F41.9 ANXIETY: ICD-10-CM

## 2022-06-18 RX ORDER — FLUOXETINE 10 MG/1
10 CAPSULE ORAL DAILY
Qty: 90 CAPSULE | Refills: 1 | Status: SHIPPED | OUTPATIENT
Start: 2022-06-18 | End: 2022-10-19

## 2022-08-02 ENCOUNTER — VIRTUAL VISIT (OUTPATIENT)
Dept: INTERNAL MEDICINE CLINIC | Age: 67
End: 2022-08-02
Payer: MEDICARE

## 2022-08-02 DIAGNOSIS — E78.5 DYSLIPIDEMIA, GOAL LDL BELOW 100: ICD-10-CM

## 2022-08-02 DIAGNOSIS — Z90.5 H/O LEFT NEPHRECTOMY: ICD-10-CM

## 2022-08-02 DIAGNOSIS — F51.02 ADJUSTMENT INSOMNIA: ICD-10-CM

## 2022-08-02 DIAGNOSIS — K75.81 NASH (NONALCOHOLIC STEATOHEPATITIS): ICD-10-CM

## 2022-08-02 DIAGNOSIS — M47.896 OTHER OSTEOARTHRITIS OF SPINE, LUMBAR REGION: Primary | ICD-10-CM

## 2022-08-02 PROBLEM — M47.816 DJD (DEGENERATIVE JOINT DISEASE), LUMBAR: Status: ACTIVE | Noted: 2022-08-02

## 2022-08-02 PROCEDURE — G8427 DOCREV CUR MEDS BY ELIG CLIN: HCPCS | Performed by: INTERNAL MEDICINE

## 2022-08-02 PROCEDURE — 3017F COLORECTAL CA SCREEN DOC REV: CPT | Performed by: INTERNAL MEDICINE

## 2022-08-02 PROCEDURE — G0463 HOSPITAL OUTPT CLINIC VISIT: HCPCS | Performed by: INTERNAL MEDICINE

## 2022-08-02 PROCEDURE — 1101F PT FALLS ASSESS-DOCD LE1/YR: CPT | Performed by: INTERNAL MEDICINE

## 2022-08-02 PROCEDURE — 1090F PRES/ABSN URINE INCON ASSESS: CPT | Performed by: INTERNAL MEDICINE

## 2022-08-02 PROCEDURE — 99214 OFFICE O/P EST MOD 30 MIN: CPT | Performed by: INTERNAL MEDICINE

## 2022-08-02 PROCEDURE — G8756 NO BP MEASURE DOC: HCPCS | Performed by: INTERNAL MEDICINE

## 2022-08-02 PROCEDURE — G8510 SCR DEP NEG, NO PLAN REQD: HCPCS | Performed by: INTERNAL MEDICINE

## 2022-08-02 PROCEDURE — G8417 CALC BMI ABV UP PARAM F/U: HCPCS | Performed by: INTERNAL MEDICINE

## 2022-08-02 PROCEDURE — G8399 PT W/DXA RESULTS DOCUMENT: HCPCS | Performed by: INTERNAL MEDICINE

## 2022-08-02 PROCEDURE — G8536 NO DOC ELDER MAL SCRN: HCPCS | Performed by: INTERNAL MEDICINE

## 2022-08-02 PROCEDURE — G9899 SCRN MAM PERF RSLTS DOC: HCPCS | Performed by: INTERNAL MEDICINE

## 2022-08-02 RX ORDER — ATORVASTATIN CALCIUM 20 MG/1
TABLET, FILM COATED ORAL
Qty: 90 TABLET | Refills: 1 | Status: SHIPPED | OUTPATIENT
Start: 2022-08-02 | End: 2022-09-23

## 2022-08-02 RX ORDER — CYCLOBENZAPRINE HCL 5 MG
5 TABLET ORAL
Qty: 30 TABLET | Refills: 4 | Status: SHIPPED | OUTPATIENT
Start: 2022-08-02

## 2022-08-02 NOTE — PROGRESS NOTES
Ly Barnes (: 1955) is a 77 y.o. female, established patient, here for evaluation of the following chief complaint(s):   Follow Up Chronic Condition (Kidney function)       ASSESSMENT/PLAN:  Below is the assessment and plan developed based on review of pertinent history, labs, studies, and medications. 1. Other osteoarthritis of spine, lumbar region-has had injections and now seeing dr Radha Pepper requests tht I fill her flexeril previously given by specialist  -     cyclobenzaprine (FLEXERIL) 5 mg tablet; Take 1 Tablet by mouth nightly as needed for Muscle Spasm(s). , Normal, Disp-30 Tablet, R-4  2. H/O left nephrectomy  3. Dyslipidemia, goal LDL below 762  -     METABOLIC PANEL, COMPREHENSIVE; Future  -     LIPID PANEL; Future  -     atorvastatin (LIPITOR) 20 mg tablet; TAKE 1 TABLET DAILY, Normal, Disp-90 Tablet, R-1  4. Adjustment insomnia-did not like trazodone off t his med made her feel groggy  5. BETTS (nonalcoholic steatohepatitis)-interested in evaluation of status referred to gi for fibroscan  Encouraged diet and exercise see me in 6 mo for med check      No follow-ups on file. SUBJECTIVE/OBJECTIVE:  HPI  Scheduled for med check in office, but changed to virtual because she had a known recent exposure to [place] [employer]. Currently not having issues. Issues:  1. Insomnia. Tried low dose Trazodone, made her groggy. Off of this, but currently using prn Flexeril and Gabapentin for her back and this seems to help with sleep as well. Caring for mother, who is receiving chemo. Remains on Fluoxetine and mood is stable. 2. History of BETTS. Does tolerate her statin. Will be due for labs in October and these will be sent. Discussed FibroScan screening and weight loss. 3. History of nephrectomy. In [place] they did screening creatinine for her. I am happy to continue with creatinines every six months coupled with lab work needed for statin. This will be in the fall.         Review of Systems Patient-Reported Weight: 168lb       Physical Exam    [INSTRUCTIONS:  \"[x]\" Indicates a positive item  \"[]\" Indicates a negative item  -- DELETE ALL ITEMS NOT EXAMINED]    Constitutional: [x] Appears well-developed and well-nourished [x] No apparent distress      [] Abnormal -     Mental status: [x] Alert and awake  [x] Oriented to person/place/time [x] Able to follow commands    [] Abnormal -     Eyes:   EOM    [x]  Normal    [] Abnormal -   Sclera  [x]  Normal    [] Abnormal -          Discharge [x]  None visible   [] Abnormal -     HENT: [x] Normocephalic, atraumatic  [] Abnormal -   [x] Mouth/Throat: Mucous membranes are moist    External Ears [x] Normal  [] Abnormal -    Neck: [x] No visualized mass [] Abnormal -     Pulmonary/Chest: [x] Respiratory effort normal   [x] No visualized signs of difficulty breathing or respiratory distress        [] Abnormal -      Musculoskeletal:   [x] Normal gait with no signs of ataxia         [x] Normal range of motion of neck        [] Abnormal -     Neurological:        [x] No Facial Asymmetry (Cranial nerve 7 motor function) (limited exam due to video visit)          [x] No gaze palsy        [] Abnormal -          Skin:        [x] No significant exanthematous lesions or discoloration noted on facial skin         [] Abnormal -            Psychiatric:       [x] Normal Affect [] Abnormal -        [x] No Hallucinations    Other pertinent observable physical exam findings:-  Looks well      Tao Diaz, was evaluated through a synchronous (real-time) audio-video encounter. The patient (or guardian if applicable) is aware that this is a billable service, which includes applicable co-pays. This Virtual Visit was conducted with patient's (and/or legal guardian's) consent.  The visit was conducted pursuant to the emergency declaration under the [facility] waiver authority and the [employer] Appropriations Act. Patient identification was verified, and a caregiver was present when appropriate. The patient was located at: Other: her car  The provider was located at: Facility (Appt Department): [address]       An electronic signature was used to authenticate this note.   -- Payton Noyola MD

## 2022-09-23 DIAGNOSIS — E78.5 DYSLIPIDEMIA, GOAL LDL BELOW 100: ICD-10-CM

## 2022-09-23 RX ORDER — ATORVASTATIN CALCIUM 20 MG/1
TABLET, FILM COATED ORAL
Qty: 90 TABLET | Refills: 1 | Status: SHIPPED | OUTPATIENT
Start: 2022-09-23

## 2022-10-13 LAB
ALBUMIN SERPL-MCNC: 4.6 G/DL (ref 3.8–4.8)
ALBUMIN/GLOB SERPL: 1.8 {RATIO} (ref 1.2–2.2)
ALP SERPL-CCNC: 92 IU/L (ref 44–121)
ALT SERPL-CCNC: 36 IU/L (ref 0–32)
AST SERPL-CCNC: 24 IU/L (ref 0–40)
BILIRUB SERPL-MCNC: 1 MG/DL (ref 0–1.2)
BUN SERPL-MCNC: 19 MG/DL (ref 8–27)
BUN/CREAT SERPL: 18 (ref 12–28)
CALCIUM SERPL-MCNC: 9.8 MG/DL (ref 8.7–10.3)
CHLORIDE SERPL-SCNC: 102 MMOL/L (ref 96–106)
CHOLEST SERPL-MCNC: 167 MG/DL (ref 100–199)
CO2 SERPL-SCNC: 22 MMOL/L (ref 20–29)
CREAT SERPL-MCNC: 1.05 MG/DL (ref 0.57–1)
EGFR: 59 ML/MIN/1.73
GLOBULIN SER CALC-MCNC: 2.6 G/DL (ref 1.5–4.5)
GLUCOSE SERPL-MCNC: 99 MG/DL (ref 70–99)
HDLC SERPL-MCNC: 56 MG/DL
IMP & REVIEW OF LAB RESULTS: NORMAL
INTERPRETATION: NORMAL
LDLC SERPL CALC-MCNC: 90 MG/DL (ref 0–99)
POTASSIUM SERPL-SCNC: 5.1 MMOL/L (ref 3.5–5.2)
PROT SERPL-MCNC: 7.2 G/DL (ref 6–8.5)
SODIUM SERPL-SCNC: 140 MMOL/L (ref 134–144)
TRIGL SERPL-MCNC: 116 MG/DL (ref 0–149)
VLDLC SERPL CALC-MCNC: 21 MG/DL (ref 5–40)

## 2022-10-19 DIAGNOSIS — F41.9 ANXIETY: ICD-10-CM

## 2022-10-19 RX ORDER — FLUOXETINE 10 MG/1
10 CAPSULE ORAL DAILY
Qty: 90 CAPSULE | Refills: 1 | Status: SHIPPED | OUTPATIENT
Start: 2022-10-19

## 2022-10-21 RX ORDER — METOPROLOL SUCCINATE 50 MG/1
TABLET, EXTENDED RELEASE ORAL
Qty: 135 TABLET | Refills: 2 | Status: SHIPPED | OUTPATIENT
Start: 2022-10-21

## 2022-10-21 NOTE — TELEPHONE ENCOUNTER
Received refill request for Toprol XL 50 mg po tabs. Refill authorized.     Future Appointments   Date Time Provider Jose Castellanos   2/9/2023  1:00 PM MD CARISSA Quintana AMB

## 2022-10-26 ENCOUNTER — TELEPHONE (OUTPATIENT)
Dept: INTERNAL MEDICINE CLINIC | Age: 67
End: 2022-10-26

## 2022-10-26 NOTE — TELEPHONE ENCOUNTER
Please call with lab results. Specifically her kidney and liver tests. She can be reached at [phone].

## 2022-10-26 NOTE — TELEPHONE ENCOUNTER
Spoke with pt and went over her labs with her. SHe just had questions about creatinine and ALT being slightly elevated.

## 2023-03-10 ENCOUNTER — TELEPHONE (OUTPATIENT)
Dept: INTERNAL MEDICINE CLINIC | Age: 68
End: 2023-03-10

## 2023-03-10 NOTE — TELEPHONE ENCOUNTER
----- Message from Tello Gonzalez sent at 3/10/2023  1:32 PM EST -----  Subject: Message to Provider    QUESTIONS  Information for Provider? Pt would like to be contacted to see if she can   get an appt for a AWV.   ---------------------------------------------------------------------------  --------------  Gabe COHEN  [phone]; OK to leave message on voicemail  ---------------------------------------------------------------------------  --------------  SCRIPT ANSWERS  Relationship to Patient?  Self

## 2023-03-24 ENCOUNTER — OFFICE VISIT (OUTPATIENT)
Dept: INTERNAL MEDICINE CLINIC | Age: 68
End: 2023-03-24

## 2023-03-24 VITALS
HEART RATE: 57 BPM | TEMPERATURE: 98.1 F | OXYGEN SATURATION: 96 % | DIASTOLIC BLOOD PRESSURE: 84 MMHG | HEIGHT: 63 IN | SYSTOLIC BLOOD PRESSURE: 132 MMHG | WEIGHT: 171.2 LBS | RESPIRATION RATE: 16 BRPM | BODY MASS INDEX: 30.33 KG/M2

## 2023-03-24 DIAGNOSIS — Z98.890 S/P COLONOSCOPY: ICD-10-CM

## 2023-03-24 DIAGNOSIS — M47.816 OSTEOARTHRITIS OF LUMBAR SPINE, UNSPECIFIED SPINAL OSTEOARTHRITIS COMPLICATION STATUS: ICD-10-CM

## 2023-03-24 DIAGNOSIS — I10 HYPERTENSION, UNSPECIFIED TYPE: ICD-10-CM

## 2023-03-24 DIAGNOSIS — Z23 ENCOUNTER FOR IMMUNIZATION: ICD-10-CM

## 2023-03-24 DIAGNOSIS — Z00.00 MEDICARE ANNUAL WELLNESS VISIT, SUBSEQUENT: Primary | ICD-10-CM

## 2023-03-24 DIAGNOSIS — Z12.31 BREAST CANCER SCREENING BY MAMMOGRAM: ICD-10-CM

## 2023-03-24 DIAGNOSIS — E78.5 DYSLIPIDEMIA, GOAL LDL BELOW 100: ICD-10-CM

## 2023-03-24 DIAGNOSIS — K75.81 NASH (NONALCOHOLIC STEATOHEPATITIS): ICD-10-CM

## 2023-03-24 RX ORDER — GABAPENTIN 100 MG/1
100 CAPSULE ORAL
Qty: 30 CAPSULE | Refills: 1 | Status: SHIPPED | OUTPATIENT
Start: 2023-03-24

## 2023-03-24 RX ORDER — CHOLECALCIFEROL (VITAMIN D3) 125 MCG
5 CAPSULE ORAL AS NEEDED
COMMUNITY

## 2023-03-24 NOTE — PATIENT INSTRUCTIONS
Medicare Wellness Visit, Female     The best way to live healthy is to have a lifestyle where you eat a well-balanced diet, exercise regularly, limit alcohol use, and quit all forms of tobacco/nicotine, if applicable. Regular preventive services are another way to keep healthy. Preventive services (vaccines, screening tests, monitoring & exams) can help personalize your care plan, which helps you manage your own care. Screening tests can find health problems at the earliest stages, when they are easiest to treat. [facility] follows the current, evidence-based guidelines published by the [facility] Wilson ([facility]) when recommending preventive services for our patients. Because we follow these guidelines, sometimes recommendations change over time as research supports it. (For example, mammograms used to be recommended annually. Even though Medicare will still pay for an annual mammogram, the newer guidelines recommend a mammogram every two years for women of average risk). Of course, you and your doctor may decide to screen more often for some diseases, based on your risk and your co-morbidities (chronic disease you are already diagnosed with). Preventive services for you include:  - Medicare offers their members a free annual wellness visit, which is time for you and your primary care provider to discuss and plan for your preventive service needs.  Take advantage of this benefit every year!    -Over the age of 72 should receive the recommended pneumonia vaccines.    -All adults should have a flu vaccine yearly.  -All adults should have a tetanus vaccine every 10 years.   -Over the age 48 should receive the shingles vaccines.        -All adults should be screened once for Hepatitis C.  -All adults age 38-68 who are overweight should have a diabetes screening test once every three years.   -Other screening tests and preventive services for persons with diabetes include: an eye exam to screen for diabetic retinopathy, a kidney function test, a foot exam, and stricter control over your cholesterol.   -Cardiovascular screening for adults with routine risk involves an electrocardiogram (ECG) at intervals determined by your doctor.     -Colorectal cancer screenings should be done for adults age 39-70 with no increased risk factors for colorectal cancer. There are a number of acceptable methods of screening for this type of cancer. Each test has its own benefits and drawbacks. Discuss with your doctor what is most appropriate for you during your annual wellness visit. The different tests include: colonoscopy (considered the best screening method), a fecal occult blood test, a fecal DNA test, and sigmoidoscopy.    -Lung cancer screening is recommended annually with a low dose CT scan for adults between age 54 and 68, who have smoked at least 30 pack years (equivalent of 1 pack per day for 30 days), and who is a current smoker or quit less than 15 years ago.    -A bone mass density test is recommended when a woman turns 65 to screen for osteoporosis. This test is only recommended one time, as a screening. Some providers will use this same test as a disease monitoring tool if you already have osteoporosis. -Breast cancer screenings are recommended every other year for women of normal risk, age 54-69.    -Cervical cancer screenings for women over age 72 are only recommended with certain risk factors.      Here is a list of your current Health Maintenance items (your personalized list of preventive services) with a due date:  Health Maintenance Due   Topic Date Due    Colorectal Screening  Never done    Shingles Vaccine (1 of 2) Never done    Annual Well Visit  05/11/2022    Yearly Flu Vaccine (1) 08/01/2022

## 2023-03-24 NOTE — PROGRESS NOTES
HISTORY OF PRESENT ILLNESS  Papito Rosenberg is a 79 y.o. female. HPI  Seen for Medicare wellness visit, but does have concerns:  1. She is having chronic back pain, has seen Dr. Kvng Knapp and Dr. Alyx Ordonez, did not feel that injections were helpful and is trying to avoid surgery. She has been given Gabapentin 100, which she uses sporadically, has Flexeril. Notes most of her pain is at night, rates it as 8/10. Does not cause weakness in legs or feet. Does keep her from exercise, which she is not doing. 2. History of V-tach over five years ago. She does have palpitations. No syncope, presyncope, chest pain or diaphoresis. Has not seen Dr. Ollie Leung recently. 3. BETTS. Has seen Dr. Miya Alvares. Fibrosis scan showed no fibrosis, although significant BETTS. She is aware that weight loss is the best treatment. 4. Dyslipidemia, on Atorvastatin 20. No diffuse myalgias. Trying to sell her house in [place] and move to [facility] to be closer to two children, as well as three grandchildren. Social History:  No tobacco, no alcohol, no regular exercise. Review of Systems   Constitutional:  Positive for malaise/fatigue. Negative for chills, fever and weight loss. Respiratory:  Negative for cough, shortness of breath and wheezing. Cardiovascular:  Negative for chest pain, palpitations, orthopnea, leg swelling and PND. Gastrointestinal:  Negative for abdominal pain, diarrhea, heartburn and nausea. Genitourinary:  Negative for dysuria. Musculoskeletal:  Positive for back pain. Negative for falls and myalgias. Neurological:  Negative for dizziness, sensory change, focal weakness and headaches. Psychiatric/Behavioral:  Negative for memory loss. Physical Exam  Vitals and nursing note reviewed. Constitutional:       Appearance: She is well-developed. HENT:      Head: Normocephalic and atraumatic.       Right Ear: Tympanic membrane and ear canal normal.      Left Ear: Tympanic membrane and ear canal normal.   Neck:      Thyroid: No thyromegaly. Vascular: No carotid bruit. Cardiovascular:      Rate and Rhythm: Normal rate and regular rhythm. Heart sounds: Normal heart sounds, S1 normal and S2 normal. No murmur heard. Pulmonary:      Effort: Pulmonary effort is normal. No respiratory distress. Breath sounds: Normal breath sounds. No wheezing or rales. Abdominal:      General: There is no distension. Palpations: Abdomen is soft. There is no mass. Tenderness: There is no abdominal tenderness. Genitourinary:     Comments: Breast exam bilaterally without masses axillary nodes or discharge. BSE reviewed     Musculoskeletal:      Cervical back: Normal range of motion and neck supple. Left lower leg: No edema. Neurological:      Mental Status: She is alert and oriented to person, place, and time. Motor: No weakness. Psychiatric:         Mood and Affect: Mood normal.         Behavior: Behavior normal.       ASSESSMENT and PLAN  Diagnoses and all orders for this visit:    1. Medicare annual wellness visit, subsequent  -     TDAP, BOOSTRIX, (AGE 10 YRS+), IM    2. Hypertension, unspecified type    3. Dyslipidemia, goal LDL below 457  -     METABOLIC PANEL, COMPREHENSIVE; Future  -     LIPID PANEL; Future  -     TSH 3RD GENERATION; Future  -     HEMOGLOBIN A1C WITH EAG; Future    4. BETTS (nonalcoholic steatohepatitis)    5. Osteoarthritis of lumbar spine, unspecified spinal osteoarthritis complication status  -     CBC WITH AUTOMATED DIFF; Future  -     gabapentin (NEURONTIN) 100 mg capsule; Take 1 Capsule by mouth nightly. Max Daily Amount: 100 mg.    6. S/P colonoscopy    7. Encounter for immunization  -     TDAP, BOOSTRIX, (AGE 10 YRS+), IM  -     ADMIN INFLUENZA VIRUS VAC    8. Breast cancer screening by mammogram  -     [facility] 3D CHRISTELLE W MAMMO BI SCREENING INCL CAD;  Future      the following changes in treatment are made: start qhs gabapentin for chronic pain  Inc exercise hopefully water based  Appt in 1moThis is the Subsequent Medicare Annual Wellness Exam, performed 12 months or more after the Initial AWV or the last Subsequent AWV    I have reviewed the patient's medical history in detail and updated the computerized patient record. Assessment/Plan   Education and counseling provided:  Are appropriate based on today's review and evaluation  Influenza Vaccine  Screening Mammography  Colorectal cancer screening tests  Bone mass measurement (DEXA)    1. Medicare annual wellness visit, subsequent  -     TDAP, BOOSTRIX, (AGE 10 YRS+), IM  2. Hypertension, unspecified type  3. Dyslipidemia, goal LDL below 075  -     METABOLIC PANEL, COMPREHENSIVE; Future  -     LIPID PANEL; Future  -     TSH 3RD GENERATION; Future  -     HEMOGLOBIN A1C WITH EAG; Future  4. BETTS (nonalcoholic steatohepatitis)  5. Osteoarthritis of lumbar spine, unspecified spinal osteoarthritis complication status  -     CBC WITH AUTOMATED DIFF; Future  -     gabapentin (NEURONTIN) 100 mg capsule; Take 1 Capsule by mouth nightly. Max Daily Amount: 100 mg., Normal, Disp-30 Capsule, R-1  6. S/P colonoscopy  7. Encounter for immunization  -     TDAP, BOOSTRIX, (AGE 10 YRS+), IM  -     ADMIN INFLUENZA VIRUS VAC  8. Breast cancer screening by mammogram  -     [facility] 3D CHRISTELLE W MAMMO BI SCREENING INCL CAD; Future       Depression Risk Factor Screening     3 most recent PHQ Screens 8/2/2022   PHQ Not Done -   Little interest or pleasure in doing things Not at all   Feeling down, depressed, irritable, or hopeless Not at all   Total Score PHQ 2 0       Alcohol & Drug Abuse Risk Screen    Do you average more than 1 drink per night or more than 7 drinks a week:  No    On any one occasion in the past three months have you have had more than 3 drinks containing alcohol:  No          Functional Ability and Level of Safety    Hearing: Hearing is good. Activities of Daily Living: The home contains: no safety equipment.   Patient does total self care      Ambulation: with no difficulty     Fall Risk:  Fall Risk Assessment, last 12 mths 8/2/2022   Able to walk? Yes   Fall in past 12 months? 0   Do you feel unsteady? 0   Are you worried about falling 0      Abuse Screen:  Patient is not abused       Cognitive Screening    Has your family/caregiver stated any concerns about your memory: no     Cognitive Screening: Normal - Verbal Fluency Test    Health Maintenance Due     Health Maintenance Due   Topic Date Due    Colorectal Cancer Screening Combo  Never done    Shingles Vaccine (1 of 2) Never done    Medicare Yearly Exam  05/11/2022    Flu Vaccine (1) 08/01/2022       Patient Care Team   Patient Care Team:  Colin Vega MD as PCP - General (Internal Medicine Physician)  Colin Vega MD as PCP - [facility] Empaneled Provider  Oumou Hurtado MD (Obstetrics & Gynecology)  Breonna Wang MD (Cardiovascular Disease Physician)    History     Patient Active Problem List   Diagnosis Code    Overweight (BMI 25.0-29. 9) E66.3    Other and unspecified hyperlipidemia E78.5    HTN (hypertension) I10    JOLLY (obstructive sleep apnea) G47.33    Anxiety F41.9    FH: ovarian cancer Z80.41    UMA (stress urinary incontinence, female) N39.3    Gastroesophageal reflux disease without esophagitis K21.9    BETTS (nonalcoholic steatohepatitis) K75.81    V tach I47.20    S/P colonoscopy Z98.890    H/O left nephrectomy Z90.5    DJD (degenerative joint disease), lumbar M47.816     Past Medical History:   Diagnosis Date    Abnormal mammogram of left breast 03/09/2017    left breast mass and poss nipple inversion- needs add views    DJD (degenerative joint disease), lumbar 8/2/2022    Dr Sedrick Gonzalez started gabapentin, dr Nagi Miranda liver     GERD (gastroesophageal reflux disease)     H/O diagnostic mammography 03/22/2017    Left breast dx mammogram & ultrasound- benign cysts    H/O left nephrectomy 3/10/2022    1/12/22 to  Donate kidney in [facility]    H/O mammogram 10/13/2020    Birads 2. Benign. Headache(784.0)     migraines    History of mammogram 10/7/15; 3/12/18; 11/18/21    Negative; Normal; neg/dense 2    Hx of biopsy 11/18/13    vulvar bx: benign    Hx of kidney removal 01/12/2022    Donated kidney     Hypercholesterolemia     Hypertension     BETTS (nonalcoholic steatohepatitis) 8/7/2020    abd us 5/20    Other ill-defined conditions(799.89)     hx vagal responces     Palpitations 8/7/2020    Stress test and holter dr lopez 2020 normal    Pap smear for cervical cancer screening 03/09/2017; 10/13/20    neg, hpv neg    Premature ventricular beat     S/P colonoscopy 5/10/2021    Dr Gaston Ruelas 2019 5 year repeat     Tubulovillous adenoma 2007    Unspecified sleep apnea     NO CPAP    V tach 5/10/2021    Cardiology dr lenny lopez    Vitamin D deficiency 01/2021      Past Surgical History:   Procedure Laterality Date    ENDOSCOPY, COLON, DIAGNOSTIC  8/2010    HX BILATERAL SALPINGO-OOPHORECTOMY  1/8/14    benign    HX CATARACT REMOVAL      bilateral    HX CHOLECYSTECTOMY      HX  [place] HX NEPHRECTOMY  01/2022    Donated kidney    HX OTHER SURGICAL      Cataract removal - 2001    HX OTHER SURGICAL  2011    EPIDURAL STEROID INJ LOW BACK    HX SALPINGO-OOPHORECTOMY      prophylactic due to family hx ovarian ca     Current Outpatient Medications   Medication Sig Dispense Refill    melatonin 5 mg tablet Take 5 mg by mouth as needed. gabapentin (NEURONTIN) 100 mg capsule Take 1 Capsule by mouth nightly. Max Daily Amount: 100 mg. 30 Capsule 1    metoprolol succinate (TOPROL-XL) 50 mg XL tablet TAKE 1 & 1/2 TABLETS BY MOUTH DAILY. 135 Tablet 2    FLUoxetine (PROzac) 10 mg capsule TAKE 1 CAPSULE BY MOUTH DAILY. 90 Capsule 1    atorvastatin (LIPITOR) 20 mg tablet TAKE 1 TABLET DAILY 90 Tablet 1    cyclobenzaprine (FLEXERIL) 5 mg tablet Take 1 Tablet by mouth nightly as needed for Muscle Spasm(s).  30 Tablet 4    CALCIUM PO Take  by mouth daily.      cholecalciferol (VITAMIN D3) (1000 Units /25 mcg) tablet Take 400 Units by mouth daily. diphenhydrAMINE (BENADRYL) 25 mg capsule Take 25 mg by mouth every six (6) hours as needed. ASCORBIC ACID, VITAMIN C, PO Take 1 Tablet by mouth daily.  (Patient not taking: Reported on 3/24/2023)       Allergies   Allergen Reactions    Apple Angioedema     Raw apples cause lip/eye to swelling  Peaches causes lip/eye swelling        Family History   Problem Relation Age of Onset    Ovarian Cancer Mother     Cancer Mother         ovarian    Dementia Father     Other Father         Insomnia    Diabetes Maternal Grandmother     Cancer Paternal Grandmother         colon    Parkinson's Disease Paternal Grandfather     Diabetes Other     Heart Disease Neg Hx     Hypertension Neg Hx      Social History     Tobacco Use    Smoking status: Never    Smokeless tobacco: Never   Substance Use Topics    Alcohol use: Not Currently         Luis Brewer MD

## 2023-04-23 DIAGNOSIS — M48.062 SPINAL STENOSIS, LUMBAR REGION WITH NEUROGENIC CLAUDICATION: Primary | ICD-10-CM

## 2023-04-23 DIAGNOSIS — M43.16 SPONDYLOLISTHESIS, LUMBAR REGION: ICD-10-CM

## 2023-05-30 ENCOUNTER — OFFICE VISIT (OUTPATIENT)
Age: 68
End: 2023-05-30
Payer: MEDICARE

## 2023-05-30 VITALS
HEART RATE: 65 BPM | WEIGHT: 170.4 LBS | TEMPERATURE: 97.8 F | HEIGHT: 63 IN | BODY MASS INDEX: 30.19 KG/M2 | SYSTOLIC BLOOD PRESSURE: 122 MMHG | RESPIRATION RATE: 16 BRPM | DIASTOLIC BLOOD PRESSURE: 80 MMHG | OXYGEN SATURATION: 95 %

## 2023-05-30 DIAGNOSIS — M47.896 OTHER SPONDYLOSIS, LUMBAR REGION: ICD-10-CM

## 2023-05-30 DIAGNOSIS — G89.29 OTHER CHRONIC PAIN: Primary | ICD-10-CM

## 2023-05-30 DIAGNOSIS — E78.5 DYSLIPIDEMIA, GOAL LDL BELOW 100: ICD-10-CM

## 2023-05-30 DIAGNOSIS — I10 HTN, GOAL BELOW 130/80: ICD-10-CM

## 2023-05-30 PROCEDURE — G8399 PT W/DXA RESULTS DOCUMENT: HCPCS | Performed by: INTERNAL MEDICINE

## 2023-05-30 PROCEDURE — 99214 OFFICE O/P EST MOD 30 MIN: CPT | Performed by: INTERNAL MEDICINE

## 2023-05-30 PROCEDURE — 1123F ACP DISCUSS/DSCN MKR DOCD: CPT | Performed by: INTERNAL MEDICINE

## 2023-05-30 PROCEDURE — 3074F SYST BP LT 130 MM HG: CPT | Performed by: INTERNAL MEDICINE

## 2023-05-30 PROCEDURE — 3017F COLORECTAL CA SCREEN DOC REV: CPT | Performed by: INTERNAL MEDICINE

## 2023-05-30 PROCEDURE — 3079F DIAST BP 80-89 MM HG: CPT | Performed by: INTERNAL MEDICINE

## 2023-05-30 PROCEDURE — 1090F PRES/ABSN URINE INCON ASSESS: CPT | Performed by: INTERNAL MEDICINE

## 2023-05-30 PROCEDURE — G8417 CALC BMI ABV UP PARAM F/U: HCPCS | Performed by: INTERNAL MEDICINE

## 2023-05-30 PROCEDURE — 4004F PT TOBACCO SCREEN RCVD TLK: CPT | Performed by: INTERNAL MEDICINE

## 2023-05-30 PROCEDURE — G8427 DOCREV CUR MEDS BY ELIG CLIN: HCPCS | Performed by: INTERNAL MEDICINE

## 2023-05-30 RX ORDER — GABAPENTIN 100 MG/1
CAPSULE ORAL
Qty: 90 CAPSULE | Refills: 5 | Status: SHIPPED | OUTPATIENT
Start: 2023-05-30 | End: 2023-11-30

## 2023-05-30 ASSESSMENT — PATIENT HEALTH QUESTIONNAIRE - PHQ9
SUM OF ALL RESPONSES TO PHQ QUESTIONS 1-9: 0
SUM OF ALL RESPONSES TO PHQ QUESTIONS 1-9: 0
2. FEELING DOWN, DEPRESSED OR HOPELESS: 0
1. LITTLE INTEREST OR PLEASURE IN DOING THINGS: 0
SUM OF ALL RESPONSES TO PHQ QUESTIONS 1-9: 0
SUM OF ALL RESPONSES TO PHQ9 QUESTIONS 1 & 2: 0
SUM OF ALL RESPONSES TO PHQ QUESTIONS 1-9: 0

## 2023-05-30 NOTE — PROGRESS NOTES
Valentina Sahni (:  1955) is a 79 y.o. female,Established patient, here for evaluation of the following chief complaint(s):  Arthritis, Hypertension, and Cholesterol Problem         ASSESSMENT/PLAN:  1. Other chronic pain-titrate to  200 mg qhs and 100 mg in am  Side effects possible reviewed in detail  Appt in 6mo sooner prn  -     gabapentin (NEURONTIN) 100 MG capsule; 1 po qam and 2 po qhs, Disp-90 capsule, R-5Normal  2. HTN, goal below 130/80-no change in bp meds  3. Other spondylosis, lumbar region  4. Dyslipidemia, goal LDL below 100      No follow-ups on file. Subjective   SUBJECTIVE/OBJECTIVE:  HPI  1 month follow-up after starting on gabapentin 100 mg at night she has not had side effects particularly no grogginess no edema does feel that it helps at night however she has found that often she needs to take 200 at night when she does this in the morning her pain level can be a 0-1 however by 4:00 in the afternoon it is up to a level 7 with sciatica and diffuse back pain. It keeps her from caring for her grandchildren and at times makes it hard to get dressed she is seeking a second opinion with another surgeon previous surgeons have suggested lumbar fusion extensively and she is understandably fearful of this did have one episode of positional dizziness blood pressure was well controlled at that time no persistent dizziness  Review of Systems       Objective   Physical Exam             An electronic signature was used to authenticate this note.     --Delia Olivo MD

## 2023-08-25 ENCOUNTER — TELEPHONE (OUTPATIENT)
Age: 68
End: 2023-08-25

## 2023-08-25 DIAGNOSIS — I10 HTN, GOAL BELOW 130/80: Primary | ICD-10-CM

## 2023-08-25 NOTE — TELEPHONE ENCOUNTER
----- Message from Larry Stuart sent at 8/25/2023 12:24 PM EDT -----  Subject: Referral Request    Reason for referral request? states that she is to have kidney function   checked every 6 months. there is current no order in the system please   notify when order is sent . Provider patient wants to be referred to(if known):     Provider Phone Number(if known):     Additional Information for Provider?   ---------------------------------------------------------------------------  --------------  [address] [place]    [phone]; OK to leave message on voicemail  ---------------------------------------------------------------------------  --------------

## 2023-08-28 DIAGNOSIS — I10 HTN, GOAL BELOW 130/80: ICD-10-CM

## 2023-08-28 LAB
ALBUMIN SERPL-MCNC: 3.8 G/DL (ref 3.5–5)
ALBUMIN/GLOB SERPL: 1.2 (ref 1.1–2.2)
ALP SERPL-CCNC: 88 U/L (ref 45–117)
ALT SERPL-CCNC: 34 U/L (ref 12–78)
ANION GAP SERPL CALC-SCNC: 5 MMOL/L (ref 5–15)
AST SERPL-CCNC: 20 U/L (ref 15–37)
BILIRUB SERPL-MCNC: 1 MG/DL (ref 0.2–1)
BUN SERPL-MCNC: 13 MG/DL (ref 6–20)
BUN/CREAT SERPL: 12 (ref 12–20)
CALCIUM SERPL-MCNC: 9.2 MG/DL (ref 8.5–10.1)
CHLORIDE SERPL-SCNC: 107 MMOL/L (ref 97–108)
CO2 SERPL-SCNC: 27 MMOL/L (ref 21–32)
CREAT SERPL-MCNC: 1.06 MG/DL (ref 0.55–1.02)
GLOBULIN SER CALC-MCNC: 3.3 G/DL (ref 2–4)
GLUCOSE SERPL-MCNC: 123 MG/DL (ref 65–100)
POTASSIUM SERPL-SCNC: 4.5 MMOL/L (ref 3.5–5.1)
PROT SERPL-MCNC: 7.1 G/DL (ref 6.4–8.2)
SODIUM SERPL-SCNC: 139 MMOL/L (ref 136–145)

## 2023-08-28 NOTE — TELEPHONE ENCOUNTER
Per MD okay to place lab order for a cmp. Patient notified the order has been placed & she can go to the lab upstairs from the office to complete. Pt voiced understanding.

## 2023-08-30 ENCOUNTER — TELEPHONE (OUTPATIENT)
Age: 68
End: 2023-08-30

## 2023-08-31 NOTE — TELEPHONE ENCOUNTER
Patient notified of recent cmp results as noted per provider's [employer] message sent on 8/28. Pt verbalized understanding of the results & had no further questions or concerns.

## 2023-09-25 RX ORDER — METOPROLOL SUCCINATE 50 MG/1
TABLET, EXTENDED RELEASE ORAL
Qty: 45 TABLET | Refills: 1 | OUTPATIENT
Start: 2023-09-25

## 2023-10-04 ENCOUNTER — TELEPHONE (OUTPATIENT)
Age: 68
End: 2023-10-04

## 2023-10-04 DIAGNOSIS — I10 HTN, GOAL BELOW 130/80: Primary | ICD-10-CM

## 2023-10-04 RX ORDER — METOPROLOL SUCCINATE 50 MG/1
75 TABLET, EXTENDED RELEASE ORAL DAILY
Qty: 135 TABLET | Refills: 0 | Status: SHIPPED | OUTPATIENT
Start: 2023-10-04

## 2023-10-04 NOTE — TELEPHONE ENCOUNTER
----- Message from Avani Fofana sent at 10/4/2023 11:42 AM EDT -----  Subject: Refill Request    QUESTIONS  Name of Medication? metoprolol succinate (TOPROL XL) 50 MG extended   release tablet  Patient-reported dosage and instructions? 1.5 tabs once a day  How many days do you have left? 0  Preferred Pharmacy? [id number]  Pharmacy phone number (if available)? [phone]  ---------------------------------------------------------------------------  --------------  CALL BACK INFO  What is the best way for the office to contact you? OK to leave message on   voicemail  Preferred Call Back Phone Number? [phone]  ---------------------------------------------------------------------------  --------------  SCRIPT ANSWERS  Relationship to Patient?  Self

## 2023-10-31 ENCOUNTER — TELEPHONE (OUTPATIENT)
Age: 68
End: 2023-10-31

## 2023-10-31 NOTE — TELEPHONE ENCOUNTER
msg from [place] (Physical Therapist): home health physical therapy eval is completed; patient is to be seen twice a week for 4 weeks, then once per week for 4 weeks.  Adding nursing and occupational therapy eval.

## 2023-11-14 ENCOUNTER — OFFICE VISIT (OUTPATIENT)
Age: 68
End: 2023-11-14
Payer: MEDICARE

## 2023-11-14 VITALS
SYSTOLIC BLOOD PRESSURE: 121 MMHG | HEART RATE: 66 BPM | DIASTOLIC BLOOD PRESSURE: 85 MMHG | BODY MASS INDEX: 29.06 KG/M2 | RESPIRATION RATE: 16 BRPM | OXYGEN SATURATION: 98 % | TEMPERATURE: 97.9 F | WEIGHT: 164 LBS | HEIGHT: 63 IN

## 2023-11-14 DIAGNOSIS — Z90.5 H/O LEFT NEPHRECTOMY: ICD-10-CM

## 2023-11-14 DIAGNOSIS — M47.26 OSTEOARTHRITIS OF SPINE WITH RADICULOPATHY, LUMBAR REGION: ICD-10-CM

## 2023-11-14 DIAGNOSIS — E78.5 DYSLIPIDEMIA, GOAL LDL BELOW 100: ICD-10-CM

## 2023-11-14 DIAGNOSIS — I10 HTN, GOAL BELOW 130/80: Primary | ICD-10-CM

## 2023-11-14 PROBLEM — M47.816 DJD (DEGENERATIVE JOINT DISEASE), LUMBAR: Status: ACTIVE | Noted: 2022-08-02

## 2023-11-14 PROCEDURE — G8484 FLU IMMUNIZE NO ADMIN: HCPCS | Performed by: INTERNAL MEDICINE

## 2023-11-14 PROCEDURE — 3017F COLORECTAL CA SCREEN DOC REV: CPT | Performed by: INTERNAL MEDICINE

## 2023-11-14 PROCEDURE — 1123F ACP DISCUSS/DSCN MKR DOCD: CPT | Performed by: INTERNAL MEDICINE

## 2023-11-14 PROCEDURE — G8399 PT W/DXA RESULTS DOCUMENT: HCPCS | Performed by: INTERNAL MEDICINE

## 2023-11-14 PROCEDURE — 99214 OFFICE O/P EST MOD 30 MIN: CPT | Performed by: INTERNAL MEDICINE

## 2023-11-14 PROCEDURE — G8417 CALC BMI ABV UP PARAM F/U: HCPCS | Performed by: INTERNAL MEDICINE

## 2023-11-14 PROCEDURE — 3074F SYST BP LT 130 MM HG: CPT | Performed by: INTERNAL MEDICINE

## 2023-11-14 PROCEDURE — 4004F PT TOBACCO SCREEN RCVD TLK: CPT | Performed by: INTERNAL MEDICINE

## 2023-11-14 PROCEDURE — G8427 DOCREV CUR MEDS BY ELIG CLIN: HCPCS | Performed by: INTERNAL MEDICINE

## 2023-11-14 PROCEDURE — 3079F DIAST BP 80-89 MM HG: CPT | Performed by: INTERNAL MEDICINE

## 2023-11-14 PROCEDURE — 1090F PRES/ABSN URINE INCON ASSESS: CPT | Performed by: INTERNAL MEDICINE

## 2023-11-14 RX ORDER — AMLODIPINE BESYLATE 2.5 MG/1
2.5 TABLET ORAL DAILY
Qty: 30 TABLET | Refills: 5 | Status: SHIPPED | OUTPATIENT
Start: 2023-11-14

## 2023-11-14 NOTE — PROGRESS NOTES
Leti Wright (:  1955) is a 79 y.o. female,Established patient, here for evaluation of the following chief complaint(s):  Follow-up and Medication Check         ASSESSMENT/PLAN:  1. HTN, goal below 130/80-not controlled add low dose ccb  Side effects possible reviewed in detail  Appt in 1mo  -     amLODIPine (NORVASC) 2.5 MG tablet; Take 1 tablet by mouth daily, Disp-30 tablet, R-5Normal  2. Dyslipidemia, goal LDL below 100  -     Comprehensive Metabolic Panel; Future  -     Lipid Panel; Future  3. H/O left nephrectomy  4. Osteoarthritis of spine with radiculopathy, lumbar region      No follow-ups on file. Subjective   SUBJECTIVE/OBJECTIVE:  HPI  To discuss her blood pressure. She has had a history of a left nephrectomy and does follow pressure closely. Currently on metoprolol 50 mg 1-1/2 tabs daily. No cardiac symptoms. Diastolics recently have been in the 85-90 range. Dyslipidemia on atorvastatin 20 no myalgias no chest pain. Arthritis of spine several weeks out from lumbar laminectomy and fusion of L4-5 with Dr. Do De Santiago done at [facility]. She is off of all pain meds including off the gabapentin. Currently without pain which is great  Review of Systems       Objective   Physical Exam  Constitutional:       Appearance: Normal appearance. HENT:      Head: Normocephalic and atraumatic. Cardiovascular:      Rate and Rhythm: Normal rate and regular rhythm. Pulmonary:      Effort: Pulmonary effort is normal.      Breath sounds: Normal breath sounds. Musculoskeletal:      Right lower leg: No edema. Left lower leg: No edema. Neurological:      General: No focal deficit present. Mental Status: She is alert and oriented to person, place, and time. Psychiatric:         Behavior: Behavior normal.                  An electronic signature was used to authenticate this note.     --Jeaneth Hernandez MD

## 2023-11-15 LAB
ALBUMIN SERPL-MCNC: 3.8 G/DL (ref 3.5–5)
ALBUMIN/GLOB SERPL: 1 (ref 1.1–2.2)
ALP SERPL-CCNC: 139 U/L (ref 45–117)
ALT SERPL-CCNC: 35 U/L (ref 12–78)
ANION GAP SERPL CALC-SCNC: 5 MMOL/L (ref 5–15)
AST SERPL-CCNC: 13 U/L (ref 15–37)
BILIRUB SERPL-MCNC: 0.9 MG/DL (ref 0.2–1)
BUN SERPL-MCNC: 15 MG/DL (ref 6–20)
BUN/CREAT SERPL: 14 (ref 12–20)
CALCIUM SERPL-MCNC: 9.6 MG/DL (ref 8.5–10.1)
CHLORIDE SERPL-SCNC: 104 MMOL/L (ref 97–108)
CHOLEST SERPL-MCNC: 163 MG/DL
CO2 SERPL-SCNC: 29 MMOL/L (ref 21–32)
CREAT SERPL-MCNC: 1.06 MG/DL (ref 0.55–1.02)
GLOBULIN SER CALC-MCNC: 3.9 G/DL (ref 2–4)
GLUCOSE SERPL-MCNC: 101 MG/DL (ref 65–100)
HDLC SERPL-MCNC: 66 MG/DL
HDLC SERPL: 2.5 (ref 0–5)
LDLC SERPL CALC-MCNC: 74.6 MG/DL (ref 0–100)
POTASSIUM SERPL-SCNC: 4.7 MMOL/L (ref 3.5–5.1)
PROT SERPL-MCNC: 7.7 G/DL (ref 6.4–8.2)
SODIUM SERPL-SCNC: 138 MMOL/L (ref 136–145)
TRIGL SERPL-MCNC: 112 MG/DL
VLDLC SERPL CALC-MCNC: 22.4 MG/DL

## 2023-11-22 DIAGNOSIS — E78.5 DYSLIPIDEMIA, GOAL LDL BELOW 100: ICD-10-CM

## 2023-11-22 RX ORDER — ATORVASTATIN CALCIUM 20 MG/1
20 TABLET, FILM COATED ORAL DAILY
Qty: 90 TABLET | Refills: 1 | Status: SHIPPED | OUTPATIENT
Start: 2023-11-22

## 2023-12-30 DIAGNOSIS — I10 HTN, GOAL BELOW 130/80: ICD-10-CM

## 2024-01-01 RX ORDER — METOPROLOL SUCCINATE 50 MG/1
TABLET, EXTENDED RELEASE ORAL
Qty: 135 TABLET | Refills: 0 | Status: SHIPPED | OUTPATIENT
Start: 2024-01-01

## 2024-01-10 ENCOUNTER — OFFICE VISIT (OUTPATIENT)
Age: 69
End: 2024-01-10
Payer: MEDICARE

## 2024-01-10 VITALS
WEIGHT: 168.4 LBS | BODY MASS INDEX: 29.83 KG/M2 | HEART RATE: 64 BPM | SYSTOLIC BLOOD PRESSURE: 121 MMHG | DIASTOLIC BLOOD PRESSURE: 80 MMHG

## 2024-01-10 DIAGNOSIS — Z12.4 CERVICAL CANCER SCREENING: ICD-10-CM

## 2024-01-10 DIAGNOSIS — Z80.41 FH: OVARIAN CANCER: ICD-10-CM

## 2024-01-10 DIAGNOSIS — E28.39 ESTROGEN DEFICIENCY: ICD-10-CM

## 2024-01-10 DIAGNOSIS — Z87.39 HX OF OSTEOPENIA: ICD-10-CM

## 2024-01-10 DIAGNOSIS — Z01.419 ENCOUNTER FOR GYNECOLOGICAL EXAMINATION: Primary | ICD-10-CM

## 2024-01-10 PROCEDURE — 3079F DIAST BP 80-89 MM HG: CPT | Performed by: OBSTETRICS & GYNECOLOGY

## 2024-01-10 PROCEDURE — G0101 CA SCREEN;PELVIC/BREAST EXAM: HCPCS | Performed by: OBSTETRICS & GYNECOLOGY

## 2024-01-10 PROCEDURE — G8484 FLU IMMUNIZE NO ADMIN: HCPCS | Performed by: OBSTETRICS & GYNECOLOGY

## 2024-01-10 PROCEDURE — 3074F SYST BP LT 130 MM HG: CPT | Performed by: OBSTETRICS & GYNECOLOGY

## 2024-01-10 NOTE — PROGRESS NOTES
Sophia Ceja is a 68 y.o. female returns for an annual exam     Chief Complaint   Patient presents with    Annual Exam       No LMP recorded (lmp unknown). Patient is postmenopausal.  Her periods are absent  Problems: no problems  Birth Control: post menopausal status.  Last Pap: see report obtained 3 year(s) ago.  She does not know have a history of JONO 2, 3 or cervical cancer.   Last Mammogram: had her mammogram today in our office.  It was see report.   Last Bone Density: see report obtained 1.5 year(s) ago.  Last colonoscopy: see report obtained 5 year(s) ago.      1. Have you been to the ER, urgent care clinic, or hospitalized since your last visit? No    2. Have you seen or consulted any other health care providers outside of the [facility] since your last visit? No    Examination chaperoned by Candis Howard LPN.

## 2024-01-10 NOTE — PROGRESS NOTES
[facility]  [email or web address]  [phone]    Jeanne Galindo MD, FACOG        Annual Gynecologic Exam:  Chief Complaint   Patient presents with    Annual Exam       Sophia Ceja is a ,  68 y.o. female   No LMP recorded (lmp unknown). Patient is postmenopausal.    She presents for her annual checkup.     She is having no problems.      Per Rooming Note:    No LMP recorded (lmp unknown). Patient is postmenopausal.  Her periods are absent  Problems: no problems  Birth Control: post menopausal status.  Last Pap: see report obtained 3 year(s) ago.  She does not know have a history of JONO 2, 3 or cervical cancer.   Last Mammogram: had her mammogram today in our office.  It was see report.   Last Bone Density: see report obtained 1.5 year(s) ago.  Last colonoscopy: see report obtained 5 year(s) ago.  Sexual history and Contraception:    Social History     Substance and Sexual Activity   Sexual Activity Not on file      Past Medical History:   Diagnosis Date    Abnormal mammogram of left breast 2017    left breast mass and poss nipple inversion- needs add views    DJD (degenerative joint disease), lumbar 2022    Dr rayo started gabapentin, dr prasad    Fatty liver     GERD (gastroesophageal reflux disease)     H/O diagnostic mammography 2017    Left breast dx mammogram & ultrasound- benign cysts    H/O left nephrectomy 3/10/2022    1/12/22 to  Donate kidney in [place]    H/O mammogram 10/13/2020    Birads 2. Benign.    Headache(784.0)     migraines    History of mammogram 10/7/15; 3/12/18; 21    Negative; Normal; neg/dense 2    Hx of biopsy 13    vulvar bx: benign    Hx of kidney removal 2022    Donated kidney     Hypercholesterolemia     Hypertension     MCNEIL (nonalcoholic steatohepatitis) 2020    abd us     Other ill-defined conditions(799.89)     hx vagal responces     Palpitations 2020    Stress test and holter dr duran

## 2024-01-11 DIAGNOSIS — F41.9 ANXIETY: Primary | ICD-10-CM

## 2024-01-11 LAB
., LABCORP: NORMAL
CYTOLOGIST CVX/VAG CYTO: NORMAL
CYTOLOGY CVX/VAG DOC CYTO: NORMAL
CYTOLOGY CVX/VAG DOC THIN PREP: NORMAL
DX ICD CODE: NORMAL
Lab: NORMAL
OTHER STN SPEC: NORMAL
STAT OF ADQ CVX/VAG CYTO-IMP: NORMAL

## 2024-01-11 RX ORDER — FLUOXETINE 10 MG/1
CAPSULE ORAL DAILY
Qty: 90 CAPSULE | Refills: 1 | Status: SHIPPED | OUTPATIENT
Start: 2024-01-11

## 2024-01-21 LAB
Lab: NEGATIVE
Lab: NORMAL

## 2024-01-26 ENCOUNTER — HOSPITAL ENCOUNTER (OUTPATIENT)
Facility: HOSPITAL | Age: 69
End: 2024-01-26
Attending: OBSTETRICS & GYNECOLOGY
Payer: MEDICARE

## 2024-01-26 DIAGNOSIS — Z01.419 ENCOUNTER FOR GYNECOLOGICAL EXAMINATION: ICD-10-CM

## 2024-01-26 DIAGNOSIS — Z87.39 HX OF OSTEOPENIA: ICD-10-CM

## 2024-01-26 DIAGNOSIS — E28.39 ESTROGEN DEFICIENCY: ICD-10-CM

## 2024-01-26 PROCEDURE — 77080 DXA BONE DENSITY AXIAL: CPT

## 2024-01-31 NOTE — RESULT ENCOUNTER NOTE
Abnormal BMD  Update PMH: w osteopenia and date of BMD  MC message sent if active on MC.  Notify patient if not on MC or message not read within two weeks.  Call or send letter and document in chart.  Pls schedule a gyn fu ~3mos for abnl BMD: check vit D if indicated, evaluate RF for osteoporosis and notify pt.  Tickle : repeat BMD 2 yrs

## 2024-03-07 ENCOUNTER — TELEPHONE (OUTPATIENT)
Age: 69
End: 2024-03-07

## 2024-03-07 ENCOUNTER — OFFICE VISIT (OUTPATIENT)
Age: 69
End: 2024-03-07
Payer: MEDICARE

## 2024-03-07 ENCOUNTER — HOSPITAL ENCOUNTER (OUTPATIENT)
Facility: HOSPITAL | Age: 69
Discharge: HOME | End: 2024-03-07
Payer: MEDICARE

## 2024-03-07 VITALS
HEIGHT: 63 IN | TEMPERATURE: 97.6 F | SYSTOLIC BLOOD PRESSURE: 119 MMHG | DIASTOLIC BLOOD PRESSURE: 75 MMHG | HEART RATE: 81 BPM | RESPIRATION RATE: 14 BRPM | OXYGEN SATURATION: 94 % | BODY MASS INDEX: 29.62 KG/M2 | WEIGHT: 167.2 LBS

## 2024-03-07 DIAGNOSIS — R05.9 COUGH IN ADULT: Primary | ICD-10-CM

## 2024-03-07 DIAGNOSIS — R05.9 COUGH IN ADULT: ICD-10-CM

## 2024-03-07 DIAGNOSIS — J98.01 BRONCHOSPASM: ICD-10-CM

## 2024-03-07 DIAGNOSIS — I10 HTN, GOAL BELOW 130/80: ICD-10-CM

## 2024-03-07 LAB
INFLUENZA A ANTIGEN, POC: NEGATIVE
INFLUENZA B ANTIGEN, POC: NEGATIVE
LOT EXPIRE DATE: NORMAL
LOT KIT NUMBER: NORMAL
SARS-COV-2 RNA, POC: NEGATIVE
VENDOR AND KIT NAME POC: NORMAL

## 2024-03-07 PROCEDURE — 3074F SYST BP LT 130 MM HG: CPT | Performed by: INTERNAL MEDICINE

## 2024-03-07 PROCEDURE — 3017F COLORECTAL CA SCREEN DOC REV: CPT | Performed by: INTERNAL MEDICINE

## 2024-03-07 PROCEDURE — 87428 SARSCOV & INF VIR A&B AG IA: CPT | Performed by: INTERNAL MEDICINE

## 2024-03-07 PROCEDURE — PBSHW AMB POC SARS-COV-2 AND INFLUENZA A/B: Performed by: INTERNAL MEDICINE

## 2024-03-07 PROCEDURE — G8399 PT W/DXA RESULTS DOCUMENT: HCPCS | Performed by: INTERNAL MEDICINE

## 2024-03-07 PROCEDURE — G8484 FLU IMMUNIZE NO ADMIN: HCPCS | Performed by: INTERNAL MEDICINE

## 2024-03-07 PROCEDURE — 3078F DIAST BP <80 MM HG: CPT | Performed by: INTERNAL MEDICINE

## 2024-03-07 PROCEDURE — 1090F PRES/ABSN URINE INCON ASSESS: CPT | Performed by: INTERNAL MEDICINE

## 2024-03-07 PROCEDURE — 1036F TOBACCO NON-USER: CPT | Performed by: INTERNAL MEDICINE

## 2024-03-07 PROCEDURE — 71046 X-RAY EXAM CHEST 2 VIEWS: CPT

## 2024-03-07 PROCEDURE — 1123F ACP DISCUSS/DSCN MKR DOCD: CPT | Performed by: INTERNAL MEDICINE

## 2024-03-07 PROCEDURE — 99214 OFFICE O/P EST MOD 30 MIN: CPT | Performed by: INTERNAL MEDICINE

## 2024-03-07 PROCEDURE — G8427 DOCREV CUR MEDS BY ELIG CLIN: HCPCS | Performed by: INTERNAL MEDICINE

## 2024-03-07 PROCEDURE — G8417 CALC BMI ABV UP PARAM F/U: HCPCS | Performed by: INTERNAL MEDICINE

## 2024-03-07 RX ORDER — BENZONATATE 200 MG/1
CAPSULE ORAL
COMMUNITY

## 2024-03-07 RX ORDER — PREDNISONE 20 MG/1
TABLET ORAL
Qty: 18 TABLET | Refills: 0 | Status: SHIPPED | OUTPATIENT
Start: 2024-03-07

## 2024-03-07 RX ORDER — DOXYCYCLINE HYCLATE 100 MG
100 TABLET ORAL EVERY 12 HOURS
COMMUNITY
Start: 2024-03-04

## 2024-03-07 RX ORDER — CODEINE PHOSPHATE/GUAIFENESIN 10-100MG/5
5 LIQUID (ML) ORAL 2 TIMES DAILY PRN
Qty: 70 ML | Refills: 0 | Status: SHIPPED | OUTPATIENT
Start: 2024-03-07 | End: 2024-03-14

## 2024-03-07 RX ORDER — ALBUTEROL SULFATE 90 UG/1
AEROSOL, METERED RESPIRATORY (INHALATION)
COMMUNITY
Start: 2024-03-04

## 2024-03-07 RX ORDER — METHYLPREDNISOLONE 4 MG/1
TABLET ORAL
COMMUNITY
Start: 2024-03-04 | End: 2024-03-07 | Stop reason: ALTCHOICE

## 2024-03-07 SDOH — ECONOMIC STABILITY: HOUSING INSECURITY
IN THE LAST 12 MONTHS, WAS THERE A TIME WHEN YOU DID NOT HAVE A STEADY PLACE TO SLEEP OR SLEPT IN A SHELTER (INCLUDING NOW)?: NO

## 2024-03-07 SDOH — ECONOMIC STABILITY: FOOD INSECURITY: WITHIN THE PAST 12 MONTHS, YOU WORRIED THAT YOUR FOOD WOULD RUN OUT BEFORE YOU GOT MONEY TO BUY MORE.: NEVER TRUE

## 2024-03-07 SDOH — ECONOMIC STABILITY: INCOME INSECURITY: HOW HARD IS IT FOR YOU TO PAY FOR THE VERY BASICS LIKE FOOD, HOUSING, MEDICAL CARE, AND HEATING?: NOT HARD AT ALL

## 2024-03-07 SDOH — ECONOMIC STABILITY: FOOD INSECURITY: WITHIN THE PAST 12 MONTHS, THE FOOD YOU BOUGHT JUST DIDN'T LAST AND YOU DIDN'T HAVE MONEY TO GET MORE.: NEVER TRUE

## 2024-03-07 ASSESSMENT — PATIENT HEALTH QUESTIONNAIRE - PHQ9
SUM OF ALL RESPONSES TO PHQ QUESTIONS 1-9: 0
SUM OF ALL RESPONSES TO PHQ QUESTIONS 1-9: 0
1. LITTLE INTEREST OR PLEASURE IN DOING THINGS: 0
SUM OF ALL RESPONSES TO PHQ QUESTIONS 1-9: 0
SUM OF ALL RESPONSES TO PHQ QUESTIONS 1-9: 0
2. FEELING DOWN, DEPRESSED OR HOPELESS: 0
SUM OF ALL RESPONSES TO PHQ9 QUESTIONS 1 & 2: 0

## 2024-03-07 NOTE — TELEPHONE ENCOUNTER
Patient was calling for same day appointment for lingering cough - went to urgent care on Monday night and was given medications for cough  Patient did not want to schedule virtual if possible because she believes provider needs to listen to her chest  Patient was told she did have bronchitis at this urgent care visit

## 2024-03-07 NOTE — PROGRESS NOTES
Sophia Ceja (:  1955) is a 68 y.o. female,Established patient, here for evaluation of the following chief complaint(s):  Follow-up (Diagnossed with bronchitis at urgent care on Monday, not getting any better)         ASSESSMENT/PLAN:  1. Cough in adult-covid and flu neg  Has considerable rad despite  medrol and doxycycline  Check cxr to be sure no pneumonia  Stop the medrol and change to a prednisone taper  Robitussin ac for bedtime  Rest and fluids  Appt if not  improved next week  -     AMB POC SARS-COV-2 AND INFLUENZA A/B  -     XR CHEST (2 VIEWS); Future  2. Bronchospasm  3. HTN, goal below 130/80-stable on meds currently      No follow-ups on file.         Subjective   SUBJECTIVE/OBJECTIVE:  HPI  About 10 days ago she started with a cough was not sure if it was different from her normal chronic cough.  Did not have fevers or myalgias.  4 days ago her cough intensified and became spastic still nonproductive somewhat sore in chest no pleuritic pain.  Some rhinorrhea.  No fevers was seen at urgent care had no specific testing but was told she had bronchitis and started on a Medrol Dosepak as well as doxycycline and an albuterol inhaler.  Feels the inhaler makes it worse.  Has had 2-1/2 days of the Medrol so far.  Still coughing especially at night.  Review of Systems       Objective   Physical Exam  Constitutional:       Appearance: Normal appearance. She is not ill-appearing or diaphoretic.   HENT:      Head: Normocephalic and atraumatic.      Right Ear: Tympanic membrane normal.      Left Ear: Tympanic membrane normal.   Cardiovascular:      Rate and Rhythm: Normal rate and regular rhythm.   Pulmonary:      Effort: Pulmonary effort is normal.      Breath sounds: Wheezing and rhonchi present. No rales.   Musculoskeletal:      Right lower leg: No edema.      Left lower leg: No edema.   Neurological:      General: No focal deficit present.      Mental Status: She is alert and oriented to person,

## 2024-03-27 ENCOUNTER — OFFICE VISIT (OUTPATIENT)
Age: 69
End: 2024-03-27
Payer: MEDICARE

## 2024-03-27 VITALS
SYSTOLIC BLOOD PRESSURE: 125 MMHG | HEIGHT: 63 IN | DIASTOLIC BLOOD PRESSURE: 78 MMHG | HEART RATE: 63 BPM | OXYGEN SATURATION: 94 % | TEMPERATURE: 98.1 F | RESPIRATION RATE: 16 BRPM | BODY MASS INDEX: 30.09 KG/M2 | WEIGHT: 169.8 LBS

## 2024-03-27 DIAGNOSIS — I47.29 OTHER VENTRICULAR TACHYCARDIA (HCC): ICD-10-CM

## 2024-03-27 DIAGNOSIS — Z90.5 H/O LEFT NEPHRECTOMY: ICD-10-CM

## 2024-03-27 DIAGNOSIS — I10 HTN, GOAL BELOW 130/80: Primary | ICD-10-CM

## 2024-03-27 DIAGNOSIS — E78.5 DYSLIPIDEMIA, GOAL LDL BELOW 100: ICD-10-CM

## 2024-03-27 DIAGNOSIS — R05.9 COUGH IN ADULT: ICD-10-CM

## 2024-03-27 PROCEDURE — 3017F COLORECTAL CA SCREEN DOC REV: CPT | Performed by: INTERNAL MEDICINE

## 2024-03-27 PROCEDURE — 1090F PRES/ABSN URINE INCON ASSESS: CPT | Performed by: INTERNAL MEDICINE

## 2024-03-27 PROCEDURE — 99214 OFFICE O/P EST MOD 30 MIN: CPT | Performed by: INTERNAL MEDICINE

## 2024-03-27 PROCEDURE — 3074F SYST BP LT 130 MM HG: CPT | Performed by: INTERNAL MEDICINE

## 2024-03-27 PROCEDURE — 1036F TOBACCO NON-USER: CPT | Performed by: INTERNAL MEDICINE

## 2024-03-27 PROCEDURE — G8399 PT W/DXA RESULTS DOCUMENT: HCPCS | Performed by: INTERNAL MEDICINE

## 2024-03-27 PROCEDURE — G8427 DOCREV CUR MEDS BY ELIG CLIN: HCPCS | Performed by: INTERNAL MEDICINE

## 2024-03-27 PROCEDURE — 1123F ACP DISCUSS/DSCN MKR DOCD: CPT | Performed by: INTERNAL MEDICINE

## 2024-03-27 PROCEDURE — 3078F DIAST BP <80 MM HG: CPT | Performed by: INTERNAL MEDICINE

## 2024-03-27 PROCEDURE — G8417 CALC BMI ABV UP PARAM F/U: HCPCS | Performed by: INTERNAL MEDICINE

## 2024-03-27 PROCEDURE — G8484 FLU IMMUNIZE NO ADMIN: HCPCS | Performed by: INTERNAL MEDICINE

## 2024-03-27 RX ORDER — METOPROLOL SUCCINATE 100 MG/1
100 TABLET, EXTENDED RELEASE ORAL DAILY
Qty: 90 TABLET | Refills: 1 | Status: SHIPPED | OUTPATIENT
Start: 2024-03-27

## 2024-03-27 RX ORDER — AMLODIPINE BESYLATE 2.5 MG/1
2.5 TABLET ORAL DAILY
Qty: 90 TABLET | Refills: 2 | Status: SHIPPED | OUTPATIENT
Start: 2024-03-27

## 2024-03-27 NOTE — PROGRESS NOTES
Sophia Ceja (:  1955) is a 68 y.o. female,Established patient, here for evaluation of the following chief complaint(s):  Hypertension (Patient is nonfasting)         ASSESSMENT/PLAN:  1. HTN, goal below 130/80-due to side effects dec amlodipine a nd inc toprol dose  Appt in 4mo  -     amLODIPine (NORVASC) 2.5 MG tablet; Take 1 tablet by mouth daily, Disp-90 tablet, R-2Normal  -     metoprolol succinate (TOPROL XL) 100 MG extended release tablet; Take 1 tablet by mouth daily, Disp-90 tablet, R-1Normal  2. Other ventricular tachycardia (HCC)  3. H/O left nephrectomy  -     Vitamin D 25 Hydroxy; Future  4. Dyslipidemia, goal LDL below 100  -     Lipid Panel; Future  -     Comprehensive Metabolic Panel; Future  -     TSH; Future  5. Cough in adult-resolved      No follow-ups on file.         Subjective   SUBJECTIVE/OBJECTIVE:  Hypertension      Follow-up feeling much better.  Within 48 hours of prednisone her cough had improved she now has just some residual discomfort in the left lower rib cage likely from all the coughing.  No fevers or chills or shortness of breath.    Hypertension on amlodipine 5 and metoprolol 75 notes some pitting edema at the end of the day and heart rate occasionally getting up to 100 does not feel dizzy does not have chest pain will decrease amlodipine and increase metoprolol to help with side effects.    Dyslipidemia tolerating atorvastatin 20 only muscle ache is along rib cage will check levels    Gynecology asking for vitamin D level discussed 2 to 4000 units as a safe daily dose and will check levels.    History of nephrectomy aiming for good blood pressure control because of this  Review of Systems       Objective   Physical Exam  Constitutional:       Appearance: Normal appearance.   HENT:      Head: Normocephalic and atraumatic.   Cardiovascular:      Rate and Rhythm: Normal rate and regular rhythm.   Pulmonary:      Effort: Pulmonary effort is normal.      Breath sounds:

## 2024-04-11 ENCOUNTER — OFFICE VISIT (OUTPATIENT)
Age: 69
End: 2024-04-11
Payer: MEDICARE

## 2024-04-11 VITALS
HEIGHT: 63 IN | SYSTOLIC BLOOD PRESSURE: 120 MMHG | DIASTOLIC BLOOD PRESSURE: 78 MMHG | WEIGHT: 166 LBS | TEMPERATURE: 98.6 F | HEART RATE: 66 BPM | BODY MASS INDEX: 29.41 KG/M2 | RESPIRATION RATE: 16 BRPM | OXYGEN SATURATION: 96 %

## 2024-04-11 DIAGNOSIS — E78.5 DYSLIPIDEMIA, GOAL LDL BELOW 100: ICD-10-CM

## 2024-04-11 DIAGNOSIS — J06.9 UPPER RESPIRATORY TRACT INFECTION, UNSPECIFIED TYPE: Primary | ICD-10-CM

## 2024-04-11 DIAGNOSIS — I10 PRIMARY HYPERTENSION: ICD-10-CM

## 2024-04-11 DIAGNOSIS — Z90.5 H/O LEFT NEPHRECTOMY: ICD-10-CM

## 2024-04-11 PROCEDURE — 99214 OFFICE O/P EST MOD 30 MIN: CPT | Performed by: NURSE PRACTITIONER

## 2024-04-11 RX ORDER — CETIRIZINE HYDROCHLORIDE 10 MG/1
10 TABLET ORAL DAILY
Qty: 30 TABLET | Refills: 0 | Status: SHIPPED | OUTPATIENT
Start: 2024-04-11 | End: 2024-05-11

## 2024-04-11 RX ORDER — BENZONATATE 100 MG/1
100-200 CAPSULE ORAL 3 TIMES DAILY PRN
Qty: 21 CAPSULE | Refills: 0 | Status: SHIPPED | OUTPATIENT
Start: 2024-04-11 | End: 2024-04-18

## 2024-04-11 RX ORDER — FLUTICASONE PROPIONATE 50 MCG
2 SPRAY, SUSPENSION (ML) NASAL DAILY
Qty: 48 G | Refills: 1 | Status: SHIPPED | OUTPATIENT
Start: 2024-04-11

## 2024-04-11 ASSESSMENT — ENCOUNTER SYMPTOMS
EYE REDNESS: 0
EYES NEGATIVE: 1
BLOOD IN STOOL: 0
SINUS PAIN: 0
CHEST TIGHTNESS: 0
DIARRHEA: 0
SHORTNESS OF BREATH: 0
SINUS PRESSURE: 0
EYE PAIN: 0
COUGH: 1
GASTROINTESTINAL NEGATIVE: 1
CONSTIPATION: 0
RHINORRHEA: 1
ABDOMINAL PAIN: 0
SORE THROAT: 1
VOMITING: 0
BACK PAIN: 0
NAUSEA: 0

## 2024-04-12 ENCOUNTER — TELEPHONE (OUTPATIENT)
Age: 69
End: 2024-04-12

## 2024-04-12 DIAGNOSIS — R74.8 ELEVATED ALKALINE PHOSPHATASE LEVEL: Primary | ICD-10-CM

## 2024-04-12 LAB
25(OH)D3 SERPL-MCNC: 57.2 NG/ML (ref 30–100)
ALBUMIN SERPL-MCNC: 3.5 G/DL (ref 3.5–5)
ALBUMIN/GLOB SERPL: 1.1 (ref 1.1–2.2)
ALP SERPL-CCNC: 126 U/L (ref 45–117)
ALT SERPL-CCNC: 29 U/L (ref 12–78)
ANION GAP SERPL CALC-SCNC: 6 MMOL/L (ref 5–15)
AST SERPL-CCNC: 14 U/L (ref 15–37)
BILIRUB SERPL-MCNC: 1.2 MG/DL (ref 0.2–1)
BUN SERPL-MCNC: 11 MG/DL (ref 6–20)
BUN/CREAT SERPL: 11 (ref 12–20)
CALCIUM SERPL-MCNC: 9.8 MG/DL (ref 8.5–10.1)
CHLORIDE SERPL-SCNC: 109 MMOL/L (ref 97–108)
CHOLEST SERPL-MCNC: 156 MG/DL
CO2 SERPL-SCNC: 27 MMOL/L (ref 21–32)
CREAT SERPL-MCNC: 1.03 MG/DL (ref 0.55–1.02)
GLOBULIN SER CALC-MCNC: 3.3 G/DL (ref 2–4)
GLUCOSE SERPL-MCNC: 104 MG/DL (ref 65–100)
HDLC SERPL-MCNC: 56 MG/DL
HDLC SERPL: 2.8 (ref 0–5)
LDLC SERPL CALC-MCNC: 73.8 MG/DL (ref 0–100)
POTASSIUM SERPL-SCNC: 4.3 MMOL/L (ref 3.5–5.1)
PROT SERPL-MCNC: 6.8 G/DL (ref 6.4–8.2)
SODIUM SERPL-SCNC: 142 MMOL/L (ref 136–145)
TRIGL SERPL-MCNC: 131 MG/DL
TSH SERPL DL<=0.05 MIU/L-ACNC: 0.88 UIU/ML (ref 0.36–3.74)
VLDLC SERPL CALC-MCNC: 26.2 MG/DL

## 2024-04-12 NOTE — TELEPHONE ENCOUNTER
We discussed her alk phos levels and  normal  ast and alt  Would get isoenzymes to further evaluate   At this time no change in medications

## 2024-04-15 DIAGNOSIS — R74.8 ELEVATED ALKALINE PHOSPHATASE LEVEL: ICD-10-CM

## 2024-04-15 DIAGNOSIS — R74.8 ELEVATED ALKALINE PHOSPHATASE LEVEL: Primary | ICD-10-CM

## 2024-04-20 LAB
ALP BONE CFR SERPL: 31 % (ref 14–68)
ALP INTEST CFR SERPL: 13 % (ref 0–18)
ALP LIVER CFR SERPL: 56 % (ref 18–85)
ALP SERPL-CCNC: 116 IU/L (ref 44–121)

## 2024-05-24 ENCOUNTER — OFFICE VISIT (OUTPATIENT)
Age: 69
End: 2024-05-24
Payer: MEDICARE

## 2024-05-24 VITALS
OXYGEN SATURATION: 99 % | DIASTOLIC BLOOD PRESSURE: 80 MMHG | BODY MASS INDEX: 29.52 KG/M2 | HEART RATE: 66 BPM | HEIGHT: 63 IN | RESPIRATION RATE: 16 BRPM | SYSTOLIC BLOOD PRESSURE: 119 MMHG | WEIGHT: 166.6 LBS | TEMPERATURE: 97 F

## 2024-05-24 DIAGNOSIS — K76.0 METABOLIC DYSFUNCTION-ASSOCIATED STEATOTIC LIVER DISEASE (MASLD): Primary | ICD-10-CM

## 2024-05-24 PROBLEM — Z98.890 S/P COLONOSCOPY: Status: RESOLVED | Noted: 2021-05-10 | Resolved: 2024-05-24

## 2024-05-24 PROBLEM — Z98.890 H/O LUMBOSACRAL SPINE SURGERY: Status: ACTIVE | Noted: 2022-08-02

## 2024-05-24 PROCEDURE — 99204 OFFICE O/P NEW MOD 45 MIN: CPT | Performed by: INTERNAL MEDICINE

## 2024-05-24 PROCEDURE — 91200 LIVER ELASTOGRAPHY: CPT | Performed by: INTERNAL MEDICINE

## 2024-05-24 NOTE — PROGRESS NOTES
Identified pt with two pt identifiers(name and ). Reviewed record in preparation for visit and have obtained necessary documentation.  Chief Complaint   Patient presents with    New Patient     self referral for fatty liver          Vitals:    24 1047   BP: 119/80   Site: Left Upper Arm   Position: Sitting   Cuff Size: Large Adult   Pulse: 66   Resp: 16   Temp: 97 °F (36.1 °C)   TempSrc: Temporal   SpO2: 99%   Weight: 75.6 kg (166 lb 9.6 oz)   Height: 1.6 m (5' 3\")      Pain Scale: 0 - No pain/10

## 2024-05-24 NOTE — PROGRESS NOTES
[facility]      Raul Jensen MD, FACP, FACG, FAASLD      ORLY Emanuel-MICK Contreras, [facility]   Alicia Allredandre, [facility]   Jamila Ernesto, [facility]  Eric Griffin, [facility]   Vivienne Fay, [facility]   Merry Aston, [facility]      [facility]   [address], [address]   [place]  [address] [phone]   FAX: [phone]  [facility]   [address], [address]   [place]  [address] [phone]   FAX: [phone]       Patient Care Team:  Shruti Carl MD as PCP - General  Shruti Carl MD as PCP - Empaneled Provider      Patient Active Problem List   Diagnosis    MJ (stress urinary incontinence, female)    Overweight (BMI 25.0-29.9)    Gastroesophageal reflux disease without esophagitis    H/O left nephrectomy    HTN (hypertension)    Metabolic dysfunction-associated steatotic liver disease (MASLD)    V tach (HCC)    Anxiety    H/O lumbosacral spine surgery       The clinicians listed above have asked me to see Sophia Ceja in consultation regarding suspected Steatotic liver disease (SLD) and its management.      All medical records sent by the referring physicians were reviewed including imaging studies     The patient is a 68 y.o. female who is suspected to have steatotic liver disease (SLD) based upon ultrasound.      A [facility] Fibrosure in 2/2024 was Fibrosis score 0.05, consistent with stage 0, Steatosis score 0.78, consistent with severe steatosis.    Serologic evaluation for markers of chronic liver disease was negative     The most recent imaging of the liver was Ultrasound performed in 5/2020.  Results suggest steatotic liver disease (SLD).      Assessment of liver fibrosis with Fibroscan was performed in the office today.  The result was 4.6 kPa which correlates with

## 2024-06-16 PROBLEM — K76.0 METABOLIC DYSFUNCTION-ASSOCIATED STEATOTIC LIVER DISEASE (MASLD): Status: ACTIVE | Noted: 2020-08-07

## 2024-06-26 DIAGNOSIS — F41.9 ANXIETY: ICD-10-CM

## 2024-06-26 RX ORDER — FLUOXETINE 10 MG/1
CAPSULE ORAL DAILY
Qty: 90 CAPSULE | Refills: 1 | Status: SHIPPED | OUTPATIENT
Start: 2024-06-26

## 2024-07-25 ENCOUNTER — OFFICE VISIT (OUTPATIENT)
Age: 69
End: 2024-07-25
Payer: MEDICARE

## 2024-07-25 VITALS
WEIGHT: 168 LBS | TEMPERATURE: 98.4 F | HEIGHT: 63 IN | BODY MASS INDEX: 29.77 KG/M2 | SYSTOLIC BLOOD PRESSURE: 122 MMHG | OXYGEN SATURATION: 95 % | HEART RATE: 63 BPM | RESPIRATION RATE: 16 BRPM | DIASTOLIC BLOOD PRESSURE: 76 MMHG

## 2024-07-25 DIAGNOSIS — Z98.890 H/O LUMBOSACRAL SPINE SURGERY: ICD-10-CM

## 2024-07-25 DIAGNOSIS — Z23 NEED FOR PROPHYLACTIC VACCINATION AGAINST STREPTOCOCCUS PNEUMONIAE (PNEUMOCOCCUS): ICD-10-CM

## 2024-07-25 DIAGNOSIS — I47.29 OTHER VENTRICULAR TACHYCARDIA (HCC): ICD-10-CM

## 2024-07-25 DIAGNOSIS — F51.02 ADJUSTMENT INSOMNIA: ICD-10-CM

## 2024-07-25 DIAGNOSIS — K75.81 NONALCOHOLIC STEATOHEPATITIS (NASH): ICD-10-CM

## 2024-07-25 DIAGNOSIS — Z00.00 MEDICARE ANNUAL WELLNESS VISIT, SUBSEQUENT: Primary | ICD-10-CM

## 2024-07-25 DIAGNOSIS — I10 PRIMARY HYPERTENSION: ICD-10-CM

## 2024-07-25 PROCEDURE — 1036F TOBACCO NON-USER: CPT | Performed by: INTERNAL MEDICINE

## 2024-07-25 PROCEDURE — G8417 CALC BMI ABV UP PARAM F/U: HCPCS | Performed by: INTERNAL MEDICINE

## 2024-07-25 PROCEDURE — 99214 OFFICE O/P EST MOD 30 MIN: CPT | Performed by: INTERNAL MEDICINE

## 2024-07-25 PROCEDURE — 3074F SYST BP LT 130 MM HG: CPT | Performed by: INTERNAL MEDICINE

## 2024-07-25 PROCEDURE — 3017F COLORECTAL CA SCREEN DOC REV: CPT | Performed by: INTERNAL MEDICINE

## 2024-07-25 PROCEDURE — 3078F DIAST BP <80 MM HG: CPT | Performed by: INTERNAL MEDICINE

## 2024-07-25 PROCEDURE — G0439 PPPS, SUBSEQ VISIT: HCPCS | Performed by: INTERNAL MEDICINE

## 2024-07-25 PROCEDURE — G8399 PT W/DXA RESULTS DOCUMENT: HCPCS | Performed by: INTERNAL MEDICINE

## 2024-07-25 PROCEDURE — 1123F ACP DISCUSS/DSCN MKR DOCD: CPT | Performed by: INTERNAL MEDICINE

## 2024-07-25 PROCEDURE — 90677 PCV20 VACCINE IM: CPT | Performed by: INTERNAL MEDICINE

## 2024-07-25 PROCEDURE — PBSHW PNEUMOCOCCAL, PCV20, PREVNAR 20, (AGE 6W+), IM, PF: Performed by: INTERNAL MEDICINE

## 2024-07-25 PROCEDURE — G8427 DOCREV CUR MEDS BY ELIG CLIN: HCPCS | Performed by: INTERNAL MEDICINE

## 2024-07-25 PROCEDURE — 1090F PRES/ABSN URINE INCON ASSESS: CPT | Performed by: INTERNAL MEDICINE

## 2024-07-25 RX ORDER — GABAPENTIN 100 MG/1
100 CAPSULE ORAL AS NEEDED
COMMUNITY

## 2024-07-25 RX ORDER — TRAZODONE HYDROCHLORIDE 50 MG/1
50 TABLET ORAL NIGHTLY
Qty: 30 TABLET | Refills: 1 | Status: SHIPPED | OUTPATIENT
Start: 2024-07-25

## 2024-07-25 RX ORDER — METHOCARBAMOL 750 MG/1
750 TABLET, FILM COATED ORAL 3 TIMES DAILY PRN
COMMUNITY
Start: 2024-06-12 | End: 2024-08-11

## 2024-07-25 NOTE — PATIENT INSTRUCTIONS
Please see [facility] to discuss the sugar cravings and snacking       A Healthy Heart: Care Instructions  Overview     Coronary artery disease, also called heart disease, occurs when a substance called plaque builds up in the vessels that supply oxygen-rich blood to your heart muscle. This can narrow the blood vessels and reduce blood flow. A heart attack happens when blood flow is completely blocked. A high-fat diet, smoking, and other factors increase the risk of heart disease.  Your doctor has found that you have a chance of having heart disease. A heart-healthy lifestyle can help keep your heart healthy and prevent heart disease. This lifestyle includes eating healthy, being active, staying at a weight that's healthy for you, and not smoking or using tobacco. It also includes taking medicines as directed, managing other health conditions, and trying to get a healthy amount of sleep.  Follow-up care is a key part of your treatment and safety. Be sure to make and go to all appointments, and call your doctor if you are having problems. It's also a good idea to know your test results and keep a list of the medicines you take.  How can you care for yourself at home?  Diet    Use less salt when you cook and eat. This helps lower your blood pressure. Taste food before salting. Add only a little salt when you think you need it. With time, your taste buds will adjust to less salt.     Eat fewer snack items, fast foods, canned soups, and other high-salt, high-fat, processed foods.     Read food labels and try to avoid saturated and trans fats. They increase your risk of heart disease by raising cholesterol levels.     Limit the amount of solid fat--butter, margarine, and shortening--you eat. Use olive, peanut, or canola oil when you cook. Bake, broil, and steam foods instead of frying them.     Eat a variety of fruit and vegetables every day. Dark green, deep orange, red, or yellow fruits and vegetables

## 2024-07-25 NOTE — PROGRESS NOTES
Sophia Ceja (:  1955) is a 68 y.o. female,Established patient, here for evaluation of the following chief complaint(s):  Medicare AWV (AWV; patient is non fasting; 4 month follow up)      Assessment & Plan   1. Medicare annual wellness visit, subsequent  2. Primary hypertension  3. Other ventricular tachycardia (HCC)  -     [facility] - Armando Nick MD, Cardiac Electrophysiology, [place] [facility])  4. Nonalcoholic steatohepatitis (MCNEIL)  5. H/O lumbosacral spine surgery  6. HTN, goal below 130/80-controlled on norvasc and toprol  Occasional palpitations not escalating  I did advise see ep cardiology again given ho v tach  7. Need for prophylactic vaccination against Streptococcus pneumoniae (pneumococcus)  -     Pneumococcal, PCV20, PREVNAR 20, (age 18 yrs+), IM, PF  8. Adjustment insomnia-disrupted sleep both initiation and maintenance not improved with otc meds  Add trazodone and appt in 1mo  -     traZODone (DESYREL) 50 MG tablet; Take 1 tablet by mouth nightly, Disp-30 tablet, R-1Normal    Discussed night time snacking-she notes sugar cravings and mentions childhood deprivation of sugar  Advise therapist to help with this pattern  No follow-ups on file.       Subjective   HPI  Seen for wellness visit but with concerns.  She notes evening snacking and sugar cravings describes that as a child she was not allowed to eat sugar and feels some of this is reactive.  Also describes poor sleep quality both trouble initiating and maintaining sleep has tried melatonin and it is not working well.  Frustrated that she cannot lose weight.  Currently on fluoxetine 10 mg.  Does not feel that this is impacted her sugar cravings she has been on it for years.  Does not feel overly depressed.    Hypertension on Toprol and amlodipine no chest pain when walking has intermittent palpitations which have not escalated has had ventricular tachycardia in the past has not had syncopal spells has not seen cardiology in the

## 2024-08-07 ENCOUNTER — TELEPHONE (OUTPATIENT)
Age: 69
End: 2024-08-07

## 2024-08-07 NOTE — TELEPHONE ENCOUNTER
Called and left message for patient to return call to schedule an appointment with Dr. Loera.     Dx: Other ventricular tachycardia (HCC)

## 2024-08-26 ENCOUNTER — TELEMEDICINE (OUTPATIENT)
Age: 69
End: 2024-08-26
Payer: MEDICARE

## 2024-08-26 DIAGNOSIS — F51.02 ADJUSTMENT INSOMNIA: ICD-10-CM

## 2024-08-26 DIAGNOSIS — U07.1 COVID-19: Primary | ICD-10-CM

## 2024-08-26 DIAGNOSIS — Z98.890 H/O LUMBOSACRAL SPINE SURGERY: ICD-10-CM

## 2024-08-26 PROCEDURE — 1090F PRES/ABSN URINE INCON ASSESS: CPT | Performed by: INTERNAL MEDICINE

## 2024-08-26 PROCEDURE — 1036F TOBACCO NON-USER: CPT | Performed by: INTERNAL MEDICINE

## 2024-08-26 PROCEDURE — G8399 PT W/DXA RESULTS DOCUMENT: HCPCS | Performed by: INTERNAL MEDICINE

## 2024-08-26 PROCEDURE — G8417 CALC BMI ABV UP PARAM F/U: HCPCS | Performed by: INTERNAL MEDICINE

## 2024-08-26 PROCEDURE — 99213 OFFICE O/P EST LOW 20 MIN: CPT | Performed by: INTERNAL MEDICINE

## 2024-08-26 PROCEDURE — 1123F ACP DISCUSS/DSCN MKR DOCD: CPT | Performed by: INTERNAL MEDICINE

## 2024-08-26 PROCEDURE — 3017F COLORECTAL CA SCREEN DOC REV: CPT | Performed by: INTERNAL MEDICINE

## 2024-08-26 PROCEDURE — G8428 CUR MEDS NOT DOCUMENT: HCPCS | Performed by: INTERNAL MEDICINE

## 2024-08-26 NOTE — ASSESSMENT & PLAN NOTE
Did see [facility]surgery at [facility] for right back andn radicular sxs  Now back on gabapentin 200 mg qhs and 100 mg bid and getting adjusted to med

## 2024-08-26 NOTE — PROGRESS NOTES
Sophia Ceja, was evaluated through a synchronous (real-time) audio-video encounter. The patient (or guardian if applicable) is aware that this is a billable service, which includes applicable co-pays. This Virtual Visit was conducted with patient's (and/or legal guardian's) consent. Patient identification was verified, and a caregiver was present when appropriate.   The patient was located at Home: [address] [place] [id number]  Provider was located at Facility (Appt Dept): [address] [place] [id number]  Confirm you are appropriately licensed, registered, or certified to deliver care in the state where the patient is located as indicated above. If you are not or unsure, please re-schedule the visit: Yes, I confirm.     Sophia Ceja (:  1955) is a Established patient, presenting virtually for evaluation of the following:      Below is the assessment and plan developed based on review of pertinent history, physical exam, labs, studies, and medications.     Assessment & Plan  COVID-19   Mild illness-fever initially but now clear  Treat with otc meds  Wants to see 91 year old mom who is fragile  Advised wait another week for travel to see her  Contact me if sxs of secondary infection         Adjustment insomnia   Now off trazodone as started on gabapentin 10 days ago for back pain  Will stay off the trazodone for  now         H/O lumbosacral spine surgery   Did [facility] at [facility] for right back andn radicular sxs  Now back on gabapentin 200 mg qhs and 100 mg bid and getting adjusted to med           No follow-ups on file.       Subjective   HPI  Follow-up concerning the trazodone was started a month ago.  However since that time she began to have right-sided radicular symptoms did see [facility] neurosurgery and is now back on gabapentin for management of the pain she understandably was concerned about combining this with trazodone so has been off of it for 10 days

## 2024-09-09 DIAGNOSIS — I10 HTN, GOAL BELOW 130/80: ICD-10-CM

## 2024-09-09 RX ORDER — METOPROLOL SUCCINATE 100 MG/1
100 TABLET, EXTENDED RELEASE ORAL DAILY
Qty: 90 TABLET | Refills: 1 | Status: SHIPPED | OUTPATIENT
Start: 2024-09-09

## 2024-09-17 DIAGNOSIS — E78.5 DYSLIPIDEMIA, GOAL LDL BELOW 100: ICD-10-CM

## 2024-09-17 RX ORDER — ATORVASTATIN CALCIUM 20 MG/1
20 TABLET, FILM COATED ORAL DAILY
Qty: 90 TABLET | Refills: 2 | Status: SHIPPED | OUTPATIENT
Start: 2024-09-17

## 2024-12-01 DIAGNOSIS — I10 HTN, GOAL BELOW 130/80: ICD-10-CM

## 2024-12-02 RX ORDER — AMLODIPINE BESYLATE 2.5 MG/1
2.5 TABLET ORAL DAILY
Qty: 90 TABLET | Refills: 0 | Status: SHIPPED | OUTPATIENT
Start: 2024-12-02

## 2025-01-02 ENCOUNTER — OFFICE VISIT (OUTPATIENT)
Facility: CLINIC | Age: 70
End: 2025-01-02
Payer: MEDICARE

## 2025-01-02 VITALS
RESPIRATION RATE: 16 BRPM | HEART RATE: 68 BPM | BODY MASS INDEX: 29.77 KG/M2 | DIASTOLIC BLOOD PRESSURE: 74 MMHG | SYSTOLIC BLOOD PRESSURE: 107 MMHG | OXYGEN SATURATION: 95 % | WEIGHT: 168 LBS | TEMPERATURE: 97.9 F | HEIGHT: 63 IN

## 2025-01-02 DIAGNOSIS — L29.9 ITCH: ICD-10-CM

## 2025-01-02 DIAGNOSIS — I10 HTN, GOAL BELOW 130/80: ICD-10-CM

## 2025-01-02 DIAGNOSIS — T78.3XXA ANGIOEDEMA, INITIAL ENCOUNTER: ICD-10-CM

## 2025-01-02 DIAGNOSIS — R07.9 CHEST PAIN AT REST: ICD-10-CM

## 2025-01-02 DIAGNOSIS — T78.3XXA ANGIOEDEMA, INITIAL ENCOUNTER: Primary | ICD-10-CM

## 2025-01-02 PROBLEM — I47.20 V TACH (HCC): Status: RESOLVED | Noted: 2021-05-10 | Resolved: 2025-01-02

## 2025-01-02 LAB
ALBUMIN SERPL-MCNC: 3.7 G/DL (ref 3.5–5)
ALBUMIN/GLOB SERPL: 1.2 (ref 1.1–2.2)
ALP SERPL-CCNC: 103 U/L (ref 45–117)
ALT SERPL-CCNC: 31 U/L (ref 12–78)
ANION GAP SERPL CALC-SCNC: 4 MMOL/L (ref 2–12)
AST SERPL-CCNC: 25 U/L (ref 15–37)
BASOPHILS # BLD: 0 K/UL (ref 0–0.1)
BASOPHILS NFR BLD: 1 % (ref 0–1)
BILIRUB SERPL-MCNC: 1 MG/DL (ref 0.2–1)
BUN SERPL-MCNC: 13 MG/DL (ref 6–20)
BUN/CREAT SERPL: 12 (ref 12–20)
CALCIUM SERPL-MCNC: 9.7 MG/DL (ref 8.5–10.1)
CHLORIDE SERPL-SCNC: 107 MMOL/L (ref 97–108)
CO2 SERPL-SCNC: 28 MMOL/L (ref 21–32)
CREAT SERPL-MCNC: 1.06 MG/DL (ref 0.55–1.02)
DIFFERENTIAL METHOD BLD: NORMAL
EOSINOPHIL # BLD: 0.2 K/UL (ref 0–0.4)
EOSINOPHIL NFR BLD: 3 % (ref 0–7)
ERYTHROCYTE [DISTWIDTH] IN BLOOD BY AUTOMATED COUNT: 13.2 % (ref 11.5–14.5)
GLOBULIN SER CALC-MCNC: 3.1 G/DL (ref 2–4)
GLUCOSE SERPL-MCNC: 123 MG/DL (ref 65–100)
HCT VFR BLD AUTO: 43.8 % (ref 35–47)
HGB BLD-MCNC: 14.4 G/DL (ref 11.5–16)
IMM GRANULOCYTES # BLD AUTO: 0 K/UL (ref 0–0.04)
IMM GRANULOCYTES NFR BLD AUTO: 0 % (ref 0–0.5)
LYMPHOCYTES # BLD: 2.2 K/UL (ref 0.8–3.5)
LYMPHOCYTES NFR BLD: 35 % (ref 12–49)
MCH RBC QN AUTO: 30.3 PG (ref 26–34)
MCHC RBC AUTO-ENTMCNC: 32.9 G/DL (ref 30–36.5)
MCV RBC AUTO: 92.2 FL (ref 80–99)
MONOCYTES # BLD: 0.4 K/UL (ref 0–1)
MONOCYTES NFR BLD: 7 % (ref 5–13)
NEUTS SEG # BLD: 3.4 K/UL (ref 1.8–8)
NEUTS SEG NFR BLD: 54 % (ref 32–75)
NRBC # BLD: 0 K/UL (ref 0–0.01)
NRBC BLD-RTO: 0 PER 100 WBC
PLATELET # BLD AUTO: 332 K/UL (ref 150–400)
PMV BLD AUTO: 10.4 FL (ref 8.9–12.9)
POTASSIUM SERPL-SCNC: 4.2 MMOL/L (ref 3.5–5.1)
PROT SERPL-MCNC: 6.8 G/DL (ref 6.4–8.2)
RBC # BLD AUTO: 4.75 M/UL (ref 3.8–5.2)
SODIUM SERPL-SCNC: 139 MMOL/L (ref 136–145)
TSH SERPL DL<=0.05 MIU/L-ACNC: 0.85 UIU/ML (ref 0.36–3.74)
WBC # BLD AUTO: 6.2 K/UL (ref 3.6–11)

## 2025-01-02 PROCEDURE — 99214 OFFICE O/P EST MOD 30 MIN: CPT | Performed by: INTERNAL MEDICINE

## 2025-01-02 ASSESSMENT — PATIENT HEALTH QUESTIONNAIRE - PHQ9
SUM OF ALL RESPONSES TO PHQ9 QUESTIONS 1 & 2: 0
SUM OF ALL RESPONSES TO PHQ QUESTIONS 1-9: 0
2. FEELING DOWN, DEPRESSED OR HOPELESS: NOT AT ALL
SUM OF ALL RESPONSES TO PHQ QUESTIONS 1-9: 0
SUM OF ALL RESPONSES TO PHQ QUESTIONS 1-9: 0
1. LITTLE INTEREST OR PLEASURE IN DOING THINGS: NOT AT ALL
SUM OF ALL RESPONSES TO PHQ QUESTIONS 1-9: 0

## 2025-01-02 NOTE — PROGRESS NOTES
Sophia Ceja (:  1955) is a 69 y.o. female,Established patient, here for evaluation of the following chief complaint(s):  Oral Swelling (Lip swelling; itching all over body; ongoing for 1 month; occurs through out the day; benadryl helps itching; lip swelling resolves in 2 days; chest pain; urgent care  testing ruled out cardiac involvement ; patient states chest pain is improving )         Assessment & Plan  Angioedema, initial encounter   Lip  swelling at least 3 episodes  Etiology  unclear  On no ace inhibitors  Add zyrtec 10 mg   See allergist  Change to hypoallergenic shampoo  Consider stopping calcium gummy as this is the most recent new product    Orders:  •  CBC with Auto Differential; Future  •  Comprehensive Metabolic Panel; Future  •  TSH; Future    HTN, goal below 130/80   controlled         Chest pain at rest   Recent EKG and troponin neg  Story suspicious for esophageal spasm  Plans to see cardiology in feb         Itch   Skin no rash seen  Add zyrtec    Orders:  •  TSH; Future      No follow-ups on file.       Subjective   HPI  Seen with 2 concerns which may or may not be related.  She notes for 10 years she has had intermittent episode of chest spasm which usually improves with drinking cold water.  On  she had an episode of chest pain which was persistent and did not respond to drinking she went to [facility] emergency room where EKG and troponin levels looked fine.  Labs were remarkable for a BUN of 20 creatinine 1.34 glucose of 129 bilirubin normal at 0.9 AST and ALT normal she was discharged home with advised to see cardiology.  She has had no further episodes of chest discomfort but notes that over the last month she has had at least 3 episodes of waking with swelling and upper lip which resolved spontaneously over a day.  Has also had more diffuse itching of trunk although no rash or hives noted.    On discussion the most recent change in oral medications is a

## 2025-01-18 DIAGNOSIS — F41.9 ANXIETY: ICD-10-CM

## 2025-01-19 RX ORDER — FLUOXETINE 10 MG/1
CAPSULE ORAL DAILY
Qty: 90 CAPSULE | Refills: 1 | Status: SHIPPED | OUTPATIENT
Start: 2025-01-19

## 2025-02-26 ENCOUNTER — OFFICE VISIT (OUTPATIENT)
Age: 70
End: 2025-02-26
Payer: MEDICARE

## 2025-02-26 VITALS
HEART RATE: 80 BPM | WEIGHT: 167 LBS | BODY MASS INDEX: 29.59 KG/M2 | SYSTOLIC BLOOD PRESSURE: 124 MMHG | RESPIRATION RATE: 18 BRPM | HEIGHT: 63 IN | OXYGEN SATURATION: 94 % | DIASTOLIC BLOOD PRESSURE: 70 MMHG

## 2025-02-26 DIAGNOSIS — R00.2 PALPITATIONS: Primary | ICD-10-CM

## 2025-02-26 DIAGNOSIS — I49.3 PVC (PREMATURE VENTRICULAR CONTRACTION): ICD-10-CM

## 2025-02-26 DIAGNOSIS — R07.89 CHEST DISCOMFORT: ICD-10-CM

## 2025-02-26 DIAGNOSIS — I10 PRIMARY HYPERTENSION: ICD-10-CM

## 2025-02-26 PROCEDURE — 99204 OFFICE O/P NEW MOD 45 MIN: CPT | Performed by: INTERNAL MEDICINE

## 2025-02-26 NOTE — PROGRESS NOTES
had concerns including Hypertension, Other, and Tachycardia.    Vitals:    02/26/25 0849   BP: 124/70   Site: Left Upper Arm   Position: Sitting   Cuff Size: Large Adult   Pulse: 80   Resp: 18   SpO2: 94%   Weight: 75.8 kg (167 lb)   Height: 1.6 m (5' 3\")        Chest pain No    Refills No        1. Have you been to the ER, urgent care clinic since your last visit? yes      Hospitalized since your last visit? No       Where?  [facility]       Date?  2 months ago

## 2025-02-26 NOTE — PATIENT INSTRUCTIONS
You have been scheduled for a stress echo.    Please wear comfortable clothing (shorts or pants with a shirt or blouse) and walking/athletic shoes.    Do not eat or drink anything, except water, for at least 2 hours prior to your test.    Do not take your scheduled medication, METOPROLOL, within the 12 hours prior to your test.

## 2025-02-26 NOTE — PROGRESS NOTES
Cardiac Electrophysiology OFFICE Initial Note     She was last seen over 3 years ago.         Assessment/Plan:    Diagnosis Orders   1. Palpitations  Stress echo (TTE) exercise (PRN contrast/bubble/strain/3D)      2. PVC (premature ventricular contraction)  Stress echo (TTE) exercise (PRN contrast/bubble/strain/3D)      3. Primary hypertension  Stress echo (TTE) exercise (PRN contrast/bubble/strain/3D)      4. Chest discomfort  Stress echo (TTE) exercise (PRN contrast/bubble/strain/3D)        Stress echo is recommended   She had chest pain and went to  [facility] urgent care  She had hx of LAD and Lcx with mild plaque    Palpitations   - holter 05/2020 with [facility] showed rare PVCs, single episode NSVT x 6 beats.   - overall controlled on Toprol     Hypertension   BP controlled   - Continue Toprol and amlodipine     Addendum  Stress echo normal 3/2025  Rare PAC and PVC with exercise  LVEF 55% rest  65% with exercise    Future Appointments   Date Time Provider Department Center   3/5/2025 11:00 AM [facility] ECHO 1 [facility] BS AMB   5/20/2025  1:00 PM Alicia Harley, APRN - NP [facility] BS AMB   2/26/2026 10:00 AM Avani Martell APRN - NP [facility] BS AMB      Armando Nick M.D.   Electrophysiology/Cardiology   [facility]   [address]                      [address]   [place] [address]                             [facility] [id number] [phone]  =======================================       Subjective:        Sophia Ceja is a 69 y.o. patient who is seen evaluation of palpitations and chest pain.     She was last seen over 3 years ago.     Previous holter 05/2020 with [facility] showed rare PVCs, single episode NSVT x 6 beats.     She went to [facility] freestanding ER two months ago for chest pain. It was felt to be due to esophageal spasm as she has a history of this. Records are not available at this time.     She

## 2025-03-01 DIAGNOSIS — I10 HTN, GOAL BELOW 130/80: ICD-10-CM

## 2025-03-02 RX ORDER — METOPROLOL SUCCINATE 100 MG/1
100 TABLET, EXTENDED RELEASE ORAL DAILY
Qty: 90 TABLET | Refills: 1 | Status: SHIPPED | OUTPATIENT
Start: 2025-03-02

## 2025-03-02 RX ORDER — AMLODIPINE BESYLATE 2.5 MG/1
2.5 TABLET ORAL DAILY
Qty: 90 TABLET | Refills: 1 | Status: SHIPPED | OUTPATIENT
Start: 2025-03-02

## 2025-03-05 ENCOUNTER — ANCILLARY PROCEDURE (OUTPATIENT)
Age: 70
End: 2025-03-05
Payer: MEDICARE

## 2025-03-05 VITALS
SYSTOLIC BLOOD PRESSURE: 130 MMHG | HEIGHT: 63 IN | DIASTOLIC BLOOD PRESSURE: 76 MMHG | BODY MASS INDEX: 29.59 KG/M2 | HEART RATE: 70 BPM | WEIGHT: 167 LBS

## 2025-03-05 DIAGNOSIS — R07.89 CHEST DISCOMFORT: ICD-10-CM

## 2025-03-05 DIAGNOSIS — I49.3 PVC (PREMATURE VENTRICULAR CONTRACTION): ICD-10-CM

## 2025-03-05 DIAGNOSIS — R00.2 PALPITATIONS: ICD-10-CM

## 2025-03-05 DIAGNOSIS — I10 PRIMARY HYPERTENSION: ICD-10-CM

## 2025-03-05 PROCEDURE — 93351 STRESS TTE COMPLETE: CPT | Performed by: INTERNAL MEDICINE

## 2025-03-06 ENCOUNTER — TELEPHONE (OUTPATIENT)
Age: 70
End: 2025-03-06

## 2025-03-06 LAB
ECHO BSA: 1.83 M2
ECHO LV EF PHYSICIAN: 55 %
STRESS BASELINE DIAS BP: 76 MMHG
STRESS BASELINE HR: 68 BPM
STRESS BASELINE SYS BP: 130 MMHG
STRESS ESTIMATED WORKLOAD: 10.5 METS
STRESS EXERCISE DUR MIN: 7 MIN
STRESS EXERCISE DUR SEC: 3 SEC
STRESS O2 SAT PEAK: 94 %
STRESS O2 SAT REST: 96 %
STRESS PEAK DIAS BP: 86 MMHG
STRESS PEAK SYS BP: 154 MMHG
STRESS PERCENT HR ACHIEVED: 85 %
STRESS POST PEAK HR: 129 BPM
STRESS RATE PRESSURE PRODUCT: NORMAL BPM*MMHG
STRESS TARGET HR: 151 BPM

## 2025-03-06 NOTE — TELEPHONE ENCOUNTER
----- Message from Dr. Armando Nick MD sent at 3/6/2025  8:50 AM EST -----  Stress echo normal  Continue follow up    Future Appointments  5/20/2025  1:00 PM    Alicia Harley APRN -# [facility]                BS AMB  2/26/2026  10:00 AM   Avani Martell APRN# [id number]               BS AMB

## 2025-03-13 ENCOUNTER — OFFICE VISIT (OUTPATIENT)
Facility: CLINIC | Age: 70
End: 2025-03-13
Payer: MEDICARE

## 2025-03-13 VITALS
TEMPERATURE: 97.5 F | OXYGEN SATURATION: 96 % | SYSTOLIC BLOOD PRESSURE: 128 MMHG | DIASTOLIC BLOOD PRESSURE: 87 MMHG | WEIGHT: 172 LBS | RESPIRATION RATE: 16 BRPM | HEART RATE: 70 BPM | BODY MASS INDEX: 30.48 KG/M2 | HEIGHT: 63 IN

## 2025-03-13 DIAGNOSIS — I10 PRIMARY HYPERTENSION: ICD-10-CM

## 2025-03-13 DIAGNOSIS — E66.3 OVERWEIGHT (BMI 25.0-29.9): Primary | ICD-10-CM

## 2025-03-13 DIAGNOSIS — R41.3 MEMORY LOSS: ICD-10-CM

## 2025-03-13 PROCEDURE — G8399 PT W/DXA RESULTS DOCUMENT: HCPCS | Performed by: INTERNAL MEDICINE

## 2025-03-13 PROCEDURE — 1159F MED LIST DOCD IN RCRD: CPT | Performed by: INTERNAL MEDICINE

## 2025-03-13 PROCEDURE — 99214 OFFICE O/P EST MOD 30 MIN: CPT | Performed by: INTERNAL MEDICINE

## 2025-03-13 PROCEDURE — G8417 CALC BMI ABV UP PARAM F/U: HCPCS | Performed by: INTERNAL MEDICINE

## 2025-03-13 PROCEDURE — 3017F COLORECTAL CA SCREEN DOC REV: CPT | Performed by: INTERNAL MEDICINE

## 2025-03-13 PROCEDURE — 1126F AMNT PAIN NOTED NONE PRSNT: CPT | Performed by: INTERNAL MEDICINE

## 2025-03-13 PROCEDURE — 1123F ACP DISCUSS/DSCN MKR DOCD: CPT | Performed by: INTERNAL MEDICINE

## 2025-03-13 PROCEDURE — 1036F TOBACCO NON-USER: CPT | Performed by: INTERNAL MEDICINE

## 2025-03-13 PROCEDURE — 1160F RVW MEDS BY RX/DR IN RCRD: CPT | Performed by: INTERNAL MEDICINE

## 2025-03-13 PROCEDURE — 3079F DIAST BP 80-89 MM HG: CPT | Performed by: INTERNAL MEDICINE

## 2025-03-13 PROCEDURE — 1090F PRES/ABSN URINE INCON ASSESS: CPT | Performed by: INTERNAL MEDICINE

## 2025-03-13 PROCEDURE — 3074F SYST BP LT 130 MM HG: CPT | Performed by: INTERNAL MEDICINE

## 2025-03-13 PROCEDURE — G8427 DOCREV CUR MEDS BY ELIG CLIN: HCPCS | Performed by: INTERNAL MEDICINE

## 2025-03-13 SDOH — ECONOMIC STABILITY: FOOD INSECURITY: WITHIN THE PAST 12 MONTHS, YOU WORRIED THAT YOUR FOOD WOULD RUN OUT BEFORE YOU GOT MONEY TO BUY MORE.: NEVER TRUE

## 2025-03-13 SDOH — ECONOMIC STABILITY: FOOD INSECURITY: WITHIN THE PAST 12 MONTHS, THE FOOD YOU BOUGHT JUST DIDN'T LAST AND YOU DIDN'T HAVE MONEY TO GET MORE.: NEVER TRUE

## 2025-03-13 NOTE — PROGRESS NOTES
Sophia Ceja (:  1955) is a 69 y.o. female,Established patient, here for evaluation of the following chief complaint(s):  Weight Loss (Discuss weight loss)         Assessment & Plan  Overweight (BMI 25.0-29.9)  Endorses sugar cravings and frequent snacking.  We discussed naltrexone.  Discussed bupropion.  Would not advise stimulants given hypertension.  Interested in dedicated program will refer to Dr. Stock    Orders:    [facility] - Lupe Holder MD, Bariatrics, [place]    Primary hypertension  Currently controlled on medications         Memory loss  Family history of dementia father and now brother.  Notes some trouble with word recall.  Discussed brain check for screening.  She is interested.  Will send her a link through [employer]           No follow-ups on file.       Subjective   Weight Loss    Review of Systems   Constitutional:  Positive for weight loss.        Objective   Physical Exam  Constitutional:       Appearance: Normal appearance.   HENT:      Head: Normocephalic and atraumatic.   Cardiovascular:      Rate and Rhythm: Normal rate and regular rhythm.   Pulmonary:      Effort: Pulmonary effort is normal.      Breath sounds: Normal breath sounds.   Musculoskeletal:      Right lower leg: No edema.      Left lower leg: No edema.   Neurological:      General: No focal deficit present.      Mental Status: She is alert and oriented to person, place, and time.   Psychiatric:         Behavior: Behavior normal.        On this date 3/13/2025 I have spent 30 minutes reviewing previous notes, test results and face to face with the patient discussing the diagnosis and importance of compliance with the treatment plan as well as documenting on the day of the visit.      An electronic signature was used to authenticate this note.    --Shruti Carl MD

## 2025-03-13 NOTE — ASSESSMENT & PLAN NOTE
Endorses sugar cravings and frequent snacking.  We discussed naltrexone.  Discussed bupropion.  Would not advise stimulants given hypertension.  Interested in dedicated program will refer to Dr. Stock    Orders:    [facility] - Lupe Holder MD, Bariatrics, [place]

## 2025-03-18 ENCOUNTER — TELEPHONE (OUTPATIENT)
Age: 70
End: 2025-03-18

## 2025-03-18 NOTE — TELEPHONE ENCOUNTER
Called patient re: we have received the completed medical weight loss new patient paperwork and now we can schedule patient for an initial consultation. Patient did not answer so I left a voicemail with our contact information to call and schedule.    History of microcytic anemia. Likely iron deficient given dysmenorrhea. Repeat CBC today.

## 2025-03-19 ENCOUNTER — OFFICE VISIT (OUTPATIENT)
Age: 70
End: 2025-03-19
Payer: MEDICARE

## 2025-03-19 VITALS
TEMPERATURE: 98.3 F | DIASTOLIC BLOOD PRESSURE: 69 MMHG | RESPIRATION RATE: 18 BRPM | WEIGHT: 171 LBS | HEIGHT: 63 IN | OXYGEN SATURATION: 96 % | BODY MASS INDEX: 30.3 KG/M2 | SYSTOLIC BLOOD PRESSURE: 108 MMHG | HEART RATE: 72 BPM

## 2025-03-19 DIAGNOSIS — E66.811 CLASS 1 OBESITY DUE TO EXCESS CALORIES WITH SERIOUS COMORBIDITY AND BODY MASS INDEX (BMI) OF 30.0 TO 30.9 IN ADULT: Primary | ICD-10-CM

## 2025-03-19 DIAGNOSIS — T14.90XA TRAUMA IN CHILDHOOD: ICD-10-CM

## 2025-03-19 DIAGNOSIS — E66.09 CLASS 1 OBESITY DUE TO EXCESS CALORIES WITH SERIOUS COMORBIDITY AND BODY MASS INDEX (BMI) OF 30.0 TO 30.9 IN ADULT: Primary | ICD-10-CM

## 2025-03-19 DIAGNOSIS — Z13.1 SCREENING FOR DIABETES MELLITUS (DM): ICD-10-CM

## 2025-03-19 PROCEDURE — 3078F DIAST BP <80 MM HG: CPT | Performed by: FAMILY MEDICINE

## 2025-03-19 PROCEDURE — 1159F MED LIST DOCD IN RCRD: CPT | Performed by: FAMILY MEDICINE

## 2025-03-19 PROCEDURE — 3074F SYST BP LT 130 MM HG: CPT | Performed by: FAMILY MEDICINE

## 2025-03-19 PROCEDURE — 1126F AMNT PAIN NOTED NONE PRSNT: CPT | Performed by: FAMILY MEDICINE

## 2025-03-19 PROCEDURE — G8427 DOCREV CUR MEDS BY ELIG CLIN: HCPCS | Performed by: FAMILY MEDICINE

## 2025-03-19 PROCEDURE — G8417 CALC BMI ABV UP PARAM F/U: HCPCS | Performed by: FAMILY MEDICINE

## 2025-03-19 PROCEDURE — 99203 OFFICE O/P NEW LOW 30 MIN: CPT | Performed by: FAMILY MEDICINE

## 2025-03-19 PROCEDURE — 1090F PRES/ABSN URINE INCON ASSESS: CPT | Performed by: FAMILY MEDICINE

## 2025-03-19 PROCEDURE — G8399 PT W/DXA RESULTS DOCUMENT: HCPCS | Performed by: FAMILY MEDICINE

## 2025-03-19 PROCEDURE — 1036F TOBACCO NON-USER: CPT | Performed by: FAMILY MEDICINE

## 2025-03-19 PROCEDURE — 1123F ACP DISCUSS/DSCN MKR DOCD: CPT | Performed by: FAMILY MEDICINE

## 2025-03-19 PROCEDURE — 3017F COLORECTAL CA SCREEN DOC REV: CPT | Performed by: FAMILY MEDICINE

## 2025-03-19 ASSESSMENT — PATIENT HEALTH QUESTIONNAIRE - PHQ9
SUM OF ALL RESPONSES TO PHQ QUESTIONS 1-9: 0
1. LITTLE INTEREST OR PLEASURE IN DOING THINGS: NOT AT ALL
SUM OF ALL RESPONSES TO PHQ QUESTIONS 1-9: 0
2. FEELING DOWN, DEPRESSED OR HOPELESS: NOT AT ALL
SUM OF ALL RESPONSES TO PHQ QUESTIONS 1-9: 0
SUM OF ALL RESPONSES TO PHQ QUESTIONS 1-9: 0

## 2025-03-19 NOTE — PROGRESS NOTES
Identified patient with two patient identifiers (name and ). Reviewed chart in preparation for visit and have obtained necessary documentation.    Sophia Ceja is a 69 y.o. female  Chief Complaint   Patient presents with    Weight Management     New Patient     /69   Pulse 72   Temp 98.3 °F (36.8 °C) (Oral)   Resp 18   Ht 1.6 m (5' 3\")   Wt 77.6 kg (171 lb)   LMP  (LMP Unknown)   SpO2 96%   BMI 30.29 kg/m²     1. Have you been to the ER, urgent care clinic since your last visit?  Hospitalized since your last visit? Yes - ER - Chest Pain     2. Have you seen or consulted any other health care providers outside of the [facility] since your last visit?  Include any pap smears or colon screening. no  
for the [facility]   The  Pt is now en-rolled into the comprehensive weight management program.     time with Sophia consisted of counseling & coordinating and/or discussing treatment plans in reference to her obesity The primary encounter diagnosis was Class 1 obesity due to excess calories with serious comorbidity and body mass index (BMI) of 30.0 to 30.9 in adult. Diagnoses of Trauma in childhood and Screening for diabetes mellitus (DM) were also pertinent to this visit.

## 2025-03-21 DIAGNOSIS — E66.811 CLASS 1 OBESITY DUE TO EXCESS CALORIES WITH SERIOUS COMORBIDITY AND BODY MASS INDEX (BMI) OF 30.0 TO 30.9 IN ADULT: ICD-10-CM

## 2025-03-21 DIAGNOSIS — E66.09 CLASS 1 OBESITY DUE TO EXCESS CALORIES WITH SERIOUS COMORBIDITY AND BODY MASS INDEX (BMI) OF 30.0 TO 30.9 IN ADULT: ICD-10-CM

## 2025-03-21 DIAGNOSIS — Z13.1 SCREENING FOR DIABETES MELLITUS (DM): ICD-10-CM

## 2025-03-21 LAB
ALBUMIN SERPL-MCNC: 3.9 G/DL (ref 3.5–5)
ALBUMIN/GLOB SERPL: 1.1 (ref 1.1–2.2)
ALP SERPL-CCNC: 96 U/L (ref 45–117)
ALT SERPL-CCNC: 35 U/L (ref 12–78)
ANION GAP SERPL CALC-SCNC: 6 MMOL/L (ref 2–12)
AST SERPL-CCNC: 21 U/L (ref 15–37)
BILIRUB SERPL-MCNC: 1.7 MG/DL (ref 0.2–1)
BUN SERPL-MCNC: 25 MG/DL (ref 6–20)
BUN/CREAT SERPL: 26 (ref 12–20)
CALCIUM SERPL-MCNC: 9.8 MG/DL (ref 8.5–10.1)
CHLORIDE SERPL-SCNC: 103 MMOL/L (ref 97–108)
CO2 SERPL-SCNC: 26 MMOL/L (ref 21–32)
CREAT SERPL-MCNC: 0.95 MG/DL (ref 0.55–1.02)
EST. AVERAGE GLUCOSE BLD GHB EST-MCNC: 120 MG/DL
GLOBULIN SER CALC-MCNC: 3.5 G/DL (ref 2–4)
GLUCOSE SERPL-MCNC: 108 MG/DL (ref 65–100)
HBA1C MFR BLD: 5.8 % (ref 4–5.6)
POTASSIUM SERPL-SCNC: 4.1 MMOL/L (ref 3.5–5.1)
PROT SERPL-MCNC: 7.4 G/DL (ref 6.4–8.2)
SODIUM SERPL-SCNC: 135 MMOL/L (ref 136–145)

## 2025-03-31 ENCOUNTER — TELEMEDICINE (OUTPATIENT)
Age: 70
End: 2025-03-31

## 2025-03-31 DIAGNOSIS — E66.09 CLASS 1 OBESITY DUE TO EXCESS CALORIES WITH SERIOUS COMORBIDITY AND BODY MASS INDEX (BMI) OF 30.0 TO 30.9 IN ADULT: Primary | ICD-10-CM

## 2025-03-31 DIAGNOSIS — E66.811 CLASS 1 OBESITY DUE TO EXCESS CALORIES WITH SERIOUS COMORBIDITY AND BODY MASS INDEX (BMI) OF 30.0 TO 30.9 IN ADULT: Primary | ICD-10-CM

## 2025-03-31 NOTE — PROGRESS NOTES
It was a pleasure to care for you today!   Your opinion matters!  Thank you for choosing the [facility].  You might receive a survey about your experience today to evaluate our clinic.  If you do receive one, please take a few minutes to complete it.    Have questions? Our preferred method for contact is a telephone call.  Please note that the number that shows up on caller ID will be [phone]. Telephone calls have a turn around time of 1 business day. Please be aware that messages left via the Patient Portal have a turn around of time of 3-5 business days.    Prescription Refills?  Please call your preferred pharmacy.    Appointment changes? If you need to cancel, change, or schedule an appointment, please call the clinic  at [phone]           [facility]  Metabolic Program Initial Nutrition Consult    Date: 3/31/2025   Physician: Lupe Holder MD  Name: Sophia Gutierrezigan  :  1955    Type of Plan: LCD   Weeks on Plan: 2 weeks    Consent:  Patient and/or their healthcare decision maker is aware that this patient-initiated Telehealth or audio encounter is a complementary visit as part of the comprehensive VLCD/LCD meal replacement program offered at [facility].  Patient is aware if they discontinue the meal replacement program, all future RD visits with this provider will become a billable service, with coverage as determined by their insurance carrier.  Patient has provided verbal understanding and consent to proceed: yes, confirmed    Sophia WALDRON Marcial, was evaluated through a synchronous (real-time) audio-video encounter. This Virtual Visit was conducted with patient's (and/or legal guardian's) consent. Patient identification was verified, and a caregiver was present when appropriate.   The patient was located at Home: [address] [place] [address]  Provider was located at Home (not Appt Dept State): [place]  Confirm you are appropriately licensed, registered, or certified to deliver care in the state where the patient is located as indicated above. If you are not or unsure, please re-schedule the visit: Yes, I confirm.      --IGNACIA MOREL, JEISON on 3/31/2025 at 2:31 PM    An electronic signature was used to authenticate this note.     ASSESSMENT:    Medications/Supplements:   Prior to Admission medications    Medication Sig Start Date End Date Taking? Authorizing Provider   metoprolol succinate (TOPROL XL) 100 MG extended release tablet TAKE 1 TABLET BY MOUTH EVERY DAY 3/2/25   Shruti Carl MD   amLODIPine (NORVASC) 2.5 MG tablet TAKE 1 TABLET BY MOUTH EVERY DAY 3/2/25   Shruti Carl MD   FLUoxetine (PROZAC) 10 MG capsule TAKE 1 CAPSULE BY MOUTH EVERY DAY 25

## 2025-04-01 ENCOUNTER — CLINICAL SUPPORT (OUTPATIENT)
Age: 70
End: 2025-04-01

## 2025-04-01 VITALS
RESPIRATION RATE: 16 BRPM | BODY MASS INDEX: 29.2 KG/M2 | HEIGHT: 63 IN | SYSTOLIC BLOOD PRESSURE: 109 MMHG | DIASTOLIC BLOOD PRESSURE: 72 MMHG | WEIGHT: 164.8 LBS | TEMPERATURE: 98.1 F | OXYGEN SATURATION: 95 % | HEART RATE: 56 BPM

## 2025-04-01 DIAGNOSIS — Z62.819 TRAUMA IN CHILDHOOD: ICD-10-CM

## 2025-04-01 DIAGNOSIS — E66.3 OVERWEIGHT (BMI 25.0-29.9): Primary | ICD-10-CM

## 2025-04-01 DIAGNOSIS — I10 PRIMARY HYPERTENSION: ICD-10-CM

## 2025-04-01 RX ORDER — AZITHROMYCIN 250 MG/1
250 TABLET, FILM COATED ORAL DAILY
COMMUNITY

## 2025-04-01 RX ORDER — AMOXICILLIN 500 MG/1
500 TABLET, FILM COATED ORAL 2 TIMES DAILY
COMMUNITY
Start: 2025-01-15

## 2025-04-01 RX ORDER — CYCLOBENZAPRINE HCL 5 MG
5 TABLET ORAL DAILY
COMMUNITY

## 2025-04-01 RX ORDER — CALCIUM CARBONATE 500 MG/1
500 TABLET, CHEWABLE ORAL DAILY
COMMUNITY

## 2025-04-01 RX ORDER — LOSARTAN POTASSIUM 50 MG/1
50 TABLET ORAL DAILY
COMMUNITY

## 2025-04-01 ASSESSMENT — PATIENT HEALTH QUESTIONNAIRE - PHQ9
1. LITTLE INTEREST OR PLEASURE IN DOING THINGS: NOT AT ALL
2. FEELING DOWN, DEPRESSED OR HOPELESS: NOT AT ALL
SUM OF ALL RESPONSES TO PHQ QUESTIONS 1-9: 0

## 2025-04-01 NOTE — PROGRESS NOTES
Identified pt with two pt identifiers (name and ). Reviewed chart in preparation for visit and have obtained necessary documentation.    Sophia Ceja is a 69 y.o. female  Chief Complaint   Patient presents with    Weight Management     LCD/  Gallo      /72 (BP Site: Left Upper Arm, Patient Position: Sitting, BP Cuff Size: Large Adult)   Pulse 56   Temp 98.1 °F (36.7 °C) (Oral)   Resp 16   Ht 1.6 m (5' 3\")   Wt 74.8 kg (164 lb 12.8 oz)   LMP  (LMP Unknown)   SpO2 95%   BMI 29.19 kg/m²     1. Have you been to the ER, urgent care clinic since your last visit?  Hospitalized since your last visit?no    2. Have you seen or consulted any other health care providers outside of the [facility] since your last visit?  Include any pap smears or colon screening. No        Progress Note: Weekly Education Class in the [facility] Weight Loss Program         Patient is on Very Low Calorie Diet [] (4 meal replacements per day, 800 kcal/day)      Low Calorie Diet [x] (2-3 meal replacements per day, 3597-5864 kcal/day)    1) Did patient have any new symptoms or physical problems?   Yes []    No [x]    If yes, check & comment: weakness [], fatigue [], lightheadedness [], headache [], cramps [], cold intolerance [], hair loss [], diarrhea [], constipation [],  NA [] other:                                 2) Has patient had any medical attention from other providers, urgent care or the emergency room this week?  Yes []  No [x]       NA [], If yes, why:                                       3) Any other sugar sweetened beverages consumed this week?   Yes []  No [x]    4) Did patient have any problems adhering to the diet? Yes [x]  No [] NA []    If yes, Vacation [], Celebrations [], Conferences [], Family Reunions [x] other: after dinner                                               5) How many hours of sleep this week? 18    (range)  NA []    Number of meal replacements consumed daily? 18.5 (range)

## 2025-04-02 ENCOUNTER — RESULTS FOLLOW-UP (OUTPATIENT)
Age: 70
End: 2025-04-02

## 2025-04-02 DIAGNOSIS — E87.1 HYPONATREMIA: Primary | ICD-10-CM

## 2025-04-03 ENCOUNTER — RESULTS FOLLOW-UP (OUTPATIENT)
Facility: CLINIC | Age: 70
End: 2025-04-03

## 2025-04-03 ENCOUNTER — OFFICE VISIT (OUTPATIENT)
Age: 70
End: 2025-04-03

## 2025-04-03 DIAGNOSIS — E66.811 CLASS 1 OBESITY DUE TO EXCESS CALORIES WITH SERIOUS COMORBIDITY AND BODY MASS INDEX (BMI) OF 30.0 TO 30.9 IN ADULT: Primary | ICD-10-CM

## 2025-04-03 DIAGNOSIS — E66.09 CLASS 1 OBESITY DUE TO EXCESS CALORIES WITH SERIOUS COMORBIDITY AND BODY MASS INDEX (BMI) OF 30.0 TO 30.9 IN ADULT: Primary | ICD-10-CM

## 2025-04-04 NOTE — PROGRESS NOTES
[facility]  Metabolic Weight Loss Program        Patient's Name: Sophia Ceja  : 1955    This patient is a participant at [facility] and attended the weekly virtual nutrition class hosted via [employer].      Melissa Calvo, MS, RD, LDN

## 2025-04-04 NOTE — RESULT ENCOUNTER NOTE
Called primary number on file to relay message that the brain check results were in. Left voicemail to call back. Will also send a Thum[employer] message.

## 2025-04-07 NOTE — PROGRESS NOTES
US/ [facility] benign: cysts. Please update chart per 1/16 protocol. Hx of HLD on Atorvastatin 20mg   - c/w home med CT Abdomen & Pelvis: Incidental 1.6 cm circumscribed enhancing mass in the pancreatic head, suspicious for a neuroendocrine tumor. Additional indeterminate 8 mm enhancing focus in the left hepatic lobe; metastasis is not excluded.   - Consult GI in the AM  - Consult Heme/Onc   - f/u Gastrin CT Abdomen & Pelvis: Incidental 1.6 cm circumscribed enhancing mass in the pancreatic head, suspicious for a neuroendocrine tumor. Additional indeterminate 8 mm enhancing focus in the left hepatic lobe; metastasis is not excluded.   - GI consulted. SHEA Jama   - Consult Heme/Onc   - f/u Gastrin

## 2025-04-08 DIAGNOSIS — E87.1 HYPONATREMIA: ICD-10-CM

## 2025-04-08 LAB
ANION GAP SERPL CALC-SCNC: 5 MMOL/L (ref 2–12)
BUN SERPL-MCNC: 22 MG/DL (ref 6–20)
BUN/CREAT SERPL: 19 (ref 12–20)
CALCIUM SERPL-MCNC: 9.6 MG/DL (ref 8.5–10.1)
CHLORIDE SERPL-SCNC: 105 MMOL/L (ref 97–108)
CO2 SERPL-SCNC: 27 MMOL/L (ref 21–32)
CREAT SERPL-MCNC: 1.15 MG/DL (ref 0.55–1.02)
GLUCOSE SERPL-MCNC: 98 MG/DL (ref 65–100)
POTASSIUM SERPL-SCNC: 4.1 MMOL/L (ref 3.5–5.1)
SODIUM SERPL-SCNC: 137 MMOL/L (ref 136–145)

## 2025-04-09 DIAGNOSIS — F51.02 ADJUSTMENT INSOMNIA: ICD-10-CM

## 2025-04-09 RX ORDER — TRAZODONE HYDROCHLORIDE 50 MG/1
50 TABLET ORAL NIGHTLY
Qty: 90 TABLET | Refills: 1 | Status: SHIPPED | OUTPATIENT
Start: 2025-04-09

## 2025-04-15 ENCOUNTER — OFFICE VISIT (OUTPATIENT)
Age: 70
End: 2025-04-15

## 2025-04-15 DIAGNOSIS — E66.09 CLASS 1 OBESITY DUE TO EXCESS CALORIES WITH SERIOUS COMORBIDITY AND BODY MASS INDEX (BMI) OF 30.0 TO 30.9 IN ADULT: Primary | ICD-10-CM

## 2025-04-15 DIAGNOSIS — E66.811 CLASS 1 OBESITY DUE TO EXCESS CALORIES WITH SERIOUS COMORBIDITY AND BODY MASS INDEX (BMI) OF 30.0 TO 30.9 IN ADULT: Primary | ICD-10-CM

## 2025-04-17 ENCOUNTER — CLINICAL SUPPORT (OUTPATIENT)
Age: 70
End: 2025-04-17

## 2025-04-17 VITALS
HEIGHT: 63 IN | HEART RATE: 72 BPM | TEMPERATURE: 98.5 F | DIASTOLIC BLOOD PRESSURE: 81 MMHG | OXYGEN SATURATION: 96 % | BODY MASS INDEX: 29.23 KG/M2 | WEIGHT: 165 LBS | RESPIRATION RATE: 16 BRPM | SYSTOLIC BLOOD PRESSURE: 122 MMHG

## 2025-04-17 DIAGNOSIS — E66.3 OVERWEIGHT (BMI 25.0-29.9): Primary | ICD-10-CM

## 2025-04-17 DIAGNOSIS — Z62.819 TRAUMA IN CHILDHOOD: ICD-10-CM

## 2025-04-17 DIAGNOSIS — I10 PRIMARY HYPERTENSION: ICD-10-CM

## 2025-04-17 ASSESSMENT — PATIENT HEALTH QUESTIONNAIRE - PHQ9
SUM OF ALL RESPONSES TO PHQ QUESTIONS 1-9: 0
SUM OF ALL RESPONSES TO PHQ QUESTIONS 1-9: 0
1. LITTLE INTEREST OR PLEASURE IN DOING THINGS: NOT AT ALL
SUM OF ALL RESPONSES TO PHQ QUESTIONS 1-9: 0
2. FEELING DOWN, DEPRESSED OR HOPELESS: NOT AT ALL
SUM OF ALL RESPONSES TO PHQ QUESTIONS 1-9: 0

## 2025-04-17 NOTE — PROGRESS NOTES
Identified pt with two pt identifiers (name and ). Reviewed chart in preparation for visit and have obtained necessary documentation.    Sophia Ceja is a 69 y.o. female  No chief complaint on file.    LMP  (LMP Unknown)     1. Have you been to the ER, urgent care clinic since your last visit?  Hospitalized since your last visit?no    2. Have you seen or consulted any other health care providers outside of the  since your last visit?  Include any pap smears or colon screening. no        Progress Note: Weekly Education Class in the [facility] Weight Loss Program         Patient is on Very Low Calorie Diet [] (4 meal replacements per day, 800 kcal/day)      Low Calorie Diet [x] (2-3 meal replacements per day, 9247-6255 kcal/day)    1) Did patient have any new symptoms or physical problems?   Yes [x]    No []    If yes, check & comment: weakness [], fatigue [], lightheadedness [], headache [], cramps [], cold intolerance [], hair loss [], diarrhea [], constipation [],  NA [] other: one night of vomiting and diarrhea                                 2) Has patient had any medical attention from other providers, urgent care or the emergency room this week?  Yes []  No [x]       NA [], If yes, why:                                       3) Any other sugar sweetened beverages consumed this week?   Yes [x]  No []    4) Did patient have any problems adhering to the diet? Yes [x]  No [] NA []    If yes, Vacation [], Celebrations [], Conferences [], Family Reunions [] other: card night with friends                                                5) How many hours of sleep this week? 38.5    (range)  NA []    Number of meal replacements consumed daily? 20 (range)  NA []    Average ounces of water patient consumed daily this week (not including shakes)? 392     (divide the weekly total by 7)    Did you eat any food outside of the program? Yes [x] No []    Physical Activity Over the Past

## 2025-04-18 ENCOUNTER — OFFICE VISIT (OUTPATIENT)
Age: 70
End: 2025-04-18

## 2025-04-18 VITALS
OXYGEN SATURATION: 98 % | HEART RATE: 64 BPM | RESPIRATION RATE: 13 BRPM | TEMPERATURE: 98.9 F | SYSTOLIC BLOOD PRESSURE: 129 MMHG | DIASTOLIC BLOOD PRESSURE: 79 MMHG

## 2025-04-18 DIAGNOSIS — J40 BRONCHITIS: Primary | ICD-10-CM

## 2025-04-18 DIAGNOSIS — R05.1 ACUTE COUGH: ICD-10-CM

## 2025-04-18 RX ORDER — AZITHROMYCIN 250 MG/1
TABLET, FILM COATED ORAL
Qty: 6 TABLET | Refills: 0 | Status: SHIPPED | OUTPATIENT
Start: 2025-04-18

## 2025-04-18 RX ORDER — PREDNISONE 20 MG/1
40 TABLET ORAL DAILY
Qty: 10 TABLET | Refills: 0 | Status: SHIPPED | OUTPATIENT
Start: 2025-04-18 | End: 2025-04-23

## 2025-04-18 RX ORDER — DEXTROMETHORPHAN HYDROBROMIDE AND PROMETHAZINE HYDROCHLORIDE 15; 6.25 MG/5ML; MG/5ML
5 SYRUP ORAL 4 TIMES DAILY PRN
Qty: 100 ML | Refills: 0 | Status: SHIPPED | OUTPATIENT
Start: 2025-04-18 | End: 2025-04-23

## 2025-04-18 ASSESSMENT — ENCOUNTER SYMPTOMS
WHEEZING: 1
CHEST TIGHTNESS: 1
SHORTNESS OF BREATH: 1
SORE THROAT: 0
COUGH: 1
SINUS PRESSURE: 0

## 2025-04-18 NOTE — PATIENT INSTRUCTIONS
Please follow up with your primary care provider within 2-5 days if your signs and symptoms have not resolved or worsened.     Please go immediately to the Emergency Department if you develop shortness of breath, chest pain and uncontrollable fever above 100.4F.       A survey will be sent shortly to your phone/email.  Please complete this so we may know how to better serve you in the future.         Nasal Congestion:  Flonase (over the counter) nasal spray, once a day  Saline irrigation kits help wash out sinuses 1-2 times a day  Normal saline nasal spray       Cough:  Throat lozenges, hot tea, and honey may help  Vicks VapoRub at night to help with cough and relieve muscles aches and pain  If not prescribed a cough medication, Delsym is an option.  It is an over the counter cough medication containing dextromethorphan to help suppress cough at night              If you have high blood pressure, take Coricidin HBP (or the generic form) instead.  Follow instructions on the box.     Congestion:  For thick mucus, take Mucinex (with Guafenesin only) to help thin the mucus.  Follow instructions on the box.  You will need to drink plenty of water with this medication.     Sore Throat:  Lozenges, as needed. Cepacol lozenges will help numb the throat  Chloraseptic spray also helps to numb throat pain  Salt water gargles to soothe throat pain     Sinus pain/pressure:  Warm, wet towel on face to help with facial sinus pain/pressure  Neti Pot or Saline Rinse can be helpful   decongestants as needed  Nasonex or Flonase or Nasacort         Afrin - no more than 2-3 days - tilt head to side and yawn to help with ear pressure   Advil or Tylenol-- you can alternate   cool mist humidifer       Headache/Pain Fever/Body Aches:  Alternate every 4 hours:    Tylenol up to 1000mg  with Ibuprofen up to 800mg    (Do not take Tylenol or Ibuprofen more frequently then every 8 hours)       Miscellanous:  Zyrtec/Xyzal/Allegra/Claritin during

## 2025-04-18 NOTE — PROGRESS NOTES
2025   Sophia Ceja (: 1955) is a 69 y.o. female, New patient, here for evaluation of the following chief complaint(s):  Cold Symptoms (C/o Upper repiratory issues started two days. OTC medication with no relief )       ASSESSMENT/PLAN:  Below is the assessment and plan developed based on review of pertinent history, physical exam, labs, studies, and medications.  1. Bronchitis  2. Acute cough  -     XR CHEST (2 VW); Future    - prednisone  - promethazine DM     Xray:  Prelim read by me - no evidence of pneumonia      Handout given with care instructions  Pt with stable VS, non-toxic appearing  Pt in no acute distress and afebrile   4.   OTC for symptom management. Increase fluid intake, ensure adequate nutritional intake.  5.   Follow up with PCP as needed.  6.   Go to ED with development of any acute symptoms.     Follow up:  Return if symptoms worsen or fail to improve.  Follow up immediately for any new, worsening or changes or if symptoms are not improving over the next 5-7 days.     SUBJECTIVE/OBJECTIVE:  HPI       Cold Symptoms (C/o Upper repiratory issues started two days. OTC medication with no relief )        Results for orders placed or performed in visit on 25   XR CHEST (2 VW)    Narrative    FINDINGS:     2 views of the chest  submitted for review.      No infiltrate seen.  No acute congestion seen.      Cardiac shadow is not enlarged. No pneumothorax is seen.        Impression    No active disease in the chest.    Electronically signed by: Petar Britton MD on 2025 02:09:20 PM   [place]           Review of Systems   Constitutional:  Negative for chills, fatigue and fever.   HENT:  Positive for congestion. Negative for sinus pressure and sore throat.    Respiratory:  Positive for cough, chest tightness, shortness of breath and wheezing.    Cardiovascular: Negative.        Asthma - no  COPD -  no  Smoke - no  Vape - no      Physical Exam  Constitutional:

## 2025-04-21 ENCOUNTER — OFFICE VISIT (OUTPATIENT)
Age: 70
End: 2025-04-21
Payer: MEDICARE

## 2025-04-21 VITALS
DIASTOLIC BLOOD PRESSURE: 67 MMHG | HEIGHT: 63 IN | OXYGEN SATURATION: 96 % | BODY MASS INDEX: 28.9 KG/M2 | RESPIRATION RATE: 16 BRPM | SYSTOLIC BLOOD PRESSURE: 117 MMHG | TEMPERATURE: 97.5 F | HEART RATE: 63 BPM | WEIGHT: 163.1 LBS

## 2025-04-21 DIAGNOSIS — Z62.819 TRAUMA IN CHILDHOOD: ICD-10-CM

## 2025-04-21 DIAGNOSIS — E66.3 OVERWEIGHT (BMI 25.0-29.9): Primary | ICD-10-CM

## 2025-04-21 DIAGNOSIS — R94.4 KIDNEY FUNCTION TEST ABNORMAL: ICD-10-CM

## 2025-04-21 DIAGNOSIS — Z90.5 H/O LEFT NEPHRECTOMY: ICD-10-CM

## 2025-04-21 DIAGNOSIS — I10 PRIMARY HYPERTENSION: ICD-10-CM

## 2025-04-21 PROCEDURE — 1090F PRES/ABSN URINE INCON ASSESS: CPT | Performed by: FAMILY MEDICINE

## 2025-04-21 PROCEDURE — 3074F SYST BP LT 130 MM HG: CPT | Performed by: FAMILY MEDICINE

## 2025-04-21 PROCEDURE — G8399 PT W/DXA RESULTS DOCUMENT: HCPCS | Performed by: FAMILY MEDICINE

## 2025-04-21 PROCEDURE — 3078F DIAST BP <80 MM HG: CPT | Performed by: FAMILY MEDICINE

## 2025-04-21 PROCEDURE — 3017F COLORECTAL CA SCREEN DOC REV: CPT | Performed by: FAMILY MEDICINE

## 2025-04-21 PROCEDURE — 1123F ACP DISCUSS/DSCN MKR DOCD: CPT | Performed by: FAMILY MEDICINE

## 2025-04-21 PROCEDURE — 1036F TOBACCO NON-USER: CPT | Performed by: FAMILY MEDICINE

## 2025-04-21 PROCEDURE — 1159F MED LIST DOCD IN RCRD: CPT | Performed by: FAMILY MEDICINE

## 2025-04-21 PROCEDURE — G8427 DOCREV CUR MEDS BY ELIG CLIN: HCPCS | Performed by: FAMILY MEDICINE

## 2025-04-21 PROCEDURE — 1126F AMNT PAIN NOTED NONE PRSNT: CPT | Performed by: FAMILY MEDICINE

## 2025-04-21 PROCEDURE — G8417 CALC BMI ABV UP PARAM F/U: HCPCS | Performed by: FAMILY MEDICINE

## 2025-04-21 PROCEDURE — 99213 OFFICE O/P EST LOW 20 MIN: CPT | Performed by: FAMILY MEDICINE

## 2025-04-21 ASSESSMENT — PATIENT HEALTH QUESTIONNAIRE - PHQ9
1. LITTLE INTEREST OR PLEASURE IN DOING THINGS: NOT AT ALL
SUM OF ALL RESPONSES TO PHQ QUESTIONS 1-9: 0
2. FEELING DOWN, DEPRESSED OR HOPELESS: NOT AT ALL
SUM OF ALL RESPONSES TO PHQ QUESTIONS 1-9: 0

## 2025-04-21 NOTE — PROGRESS NOTES
Identified pt with two pt identifiers (name and ). Reviewed chart in preparation for visit and have obtained necessary documentation.    Sophia Ceja is a 69 y.o. female  Chief Complaint   Patient presents with    Weight Management     LCD/ 1 month      /67 (BP Site: Left Upper Arm, Patient Position: Sitting, BP Cuff Size: Large Adult)   Pulse 63   Temp 97.5 °F (36.4 °C) (Oral)   Resp 16   Ht 1.6 m (5' 3\")   Wt 74 kg (163 lb 1.6 oz)   LMP  (LMP Unknown)   SpO2 96%   BMI 28.89 kg/m²     1. Have you been to the ER, urgent care clinic since your last visit?  Hospitalized since your last visit?no    2. Have you seen or consulted any other health care providers outside of the [facility] since your last visit?  Include any pap smears or colon screening. No    Patient attended triage but did not bring homework form. Patient instructed to email or fax completed homework form to us. Patient informed that not bringing the homework form can result in not being seen next time.       Patient Dx with bronchitis last Thursday, on medications.    
   losartan (COZAAR) 50 MG tablet Take 1 tablet by mouth daily (Patient not taking: Reported on 4/1/2025)      calcium carbonate (TUMS) 500 MG chewable tablet Take 1 tablet by mouth daily (Patient not taking: Reported on 4/21/2025)      azithromycin (ZITHROMAX) 250 MG tablet Take 1 tablet by mouth daily (Patient not taking: Reported on 4/21/2025)      amoxicillin (AMOXIL) 500 MG tablet Take 1 tablet by mouth 2 times daily (Patient not taking: Reported on 4/21/2025)      VITAMIN D PO Take by mouth daily (Patient not taking: Reported on 4/21/2025)      Ascorbic Acid (VITAMIN C PO) Take by mouth daily (Patient not taking: Reported on 4/21/2025)      METOPROLOL SUCCINATE PO Take by mouth daily (Patient not taking: Reported on 4/1/2025)      diphenhydrAMINE HCl (BENADRYL ALLERGY PO) Take by mouth as needed (Patient not taking: Reported on 4/21/2025)      ATORVASTATIN CALCIUM PO Take by mouth daily (Patient not taking: Reported on 4/21/2025)      AMLODIPINE BESYLATE PO Take by mouth daily (Patient not taking: Reported on 4/21/2025)      gabapentin (NEURONTIN) 100 MG capsule Take 1 capsule by mouth as needed.       No current facility-administered medications for this visit.          Participation   Did you attend clinic and class last week? no    Review of Systems  Since your last visit, have you experienced any complications? no  If yes, please list:       Are you taking an appetite suppressant? no  If so, is there any Chest Pain, Palpitations or Dizziness?      HUNGER CONTROL: good    BP Readings from Last 3 Encounters:   04/21/25 117/67   04/18/25 129/79   04/17/25 122/81       SLEEP:troublr falling asleep, this past week with prednisone and coughing has been bad  Typically 5-7 hrs    Have you received any other medical care this week? no  If yes, where and for what?       Have you discontinued or changed any medicine or dose of your medicine since your last visit with Dr Holder? no  If yes, where and for what?

## 2025-04-22 ENCOUNTER — OFFICE VISIT (OUTPATIENT)
Age: 70
End: 2025-04-22

## 2025-04-22 DIAGNOSIS — E66.3 OVERWEIGHT (BMI 25.0-29.9): Primary | ICD-10-CM

## 2025-04-22 DIAGNOSIS — R94.4 KIDNEY FUNCTION TEST ABNORMAL: ICD-10-CM

## 2025-04-22 DIAGNOSIS — Z90.5 H/O LEFT NEPHRECTOMY: ICD-10-CM

## 2025-04-22 DIAGNOSIS — E66.3 OVERWEIGHT (BMI 25.0-29.9): ICD-10-CM

## 2025-04-22 NOTE — PROGRESS NOTES
[facility]  Metabolic Weight Loss Program        Patient's Name: Sophia Ceja  : 1955    This patient is a participant at [facility] and attended the weekly virtual nutrition class hosted via [employer].      Melissa Calvo, MS, RD, LDN

## 2025-04-23 LAB
ALBUMIN SERPL-MCNC: 3.7 G/DL (ref 3.5–5)
ALBUMIN/GLOB SERPL: 1.1 (ref 1.1–2.2)
ALP SERPL-CCNC: 92 U/L (ref 45–117)
ALT SERPL-CCNC: 36 U/L (ref 12–78)
ANION GAP SERPL CALC-SCNC: 8 MMOL/L (ref 2–12)
AST SERPL-CCNC: 22 U/L (ref 15–37)
BILIRUB SERPL-MCNC: 0.9 MG/DL (ref 0.2–1)
BUN SERPL-MCNC: 31 MG/DL (ref 6–20)
BUN/CREAT SERPL: 29 (ref 12–20)
CALCIUM SERPL-MCNC: 9.4 MG/DL (ref 8.5–10.1)
CHLORIDE SERPL-SCNC: 105 MMOL/L (ref 97–108)
CO2 SERPL-SCNC: 27 MMOL/L (ref 21–32)
CREAT SERPL-MCNC: 1.07 MG/DL (ref 0.55–1.02)
GLOBULIN SER CALC-MCNC: 3.3 G/DL (ref 2–4)
GLUCOSE SERPL-MCNC: 137 MG/DL (ref 65–100)
POTASSIUM SERPL-SCNC: 3.9 MMOL/L (ref 3.5–5.1)
PROT SERPL-MCNC: 7 G/DL (ref 6.4–8.2)
SODIUM SERPL-SCNC: 140 MMOL/L (ref 136–145)

## 2025-04-23 NOTE — PROGRESS NOTES
Nurse note from patient's weekly  / LCD / Maintenance class was reviewed.  Pertinent medical concerns were:   reviewed     BP Readings from Last 3 Encounters:   04/21/25 117/67   04/18/25 129/79   04/17/25 122/81       Failed to redirect to the Timeline version of the [facility].    Current Outpatient Medications   Medication Sig Dispense Refill    traZODone (DESYREL) 50 MG tablet TAKE 1 TABLET BY MOUTH NIGHTLY 90 tablet 1    ACETAMINOPHEN PO Take by mouth as needed      metoprolol succinate (TOPROL XL) 100 MG extended release tablet TAKE 1 TABLET BY MOUTH EVERY DAY 90 tablet 1    amLODIPine (NORVASC) 2.5 MG tablet TAKE 1 TABLET BY MOUTH EVERY DAY 90 tablet 1    FLUoxetine (PROZAC) 10 MG capsule TAKE 1 CAPSULE BY MOUTH EVERY DAY 90 capsule 1    atorvastatin (LIPITOR) 20 MG tablet TAKE 1 TABLET BY MOUTH EVERY DAY 90 tablet 2    gabapentin (NEURONTIN) 100 MG capsule Take 1 capsule by mouth as needed.      Melatonin 5 MG CAPS Take 1 tablet by mouth nightly as needed (insomnia) 5-10 mg as needed      CALCIUM PO Take by mouth daily      vitamin D (CHOLECALCIFEROL) 25 MCG (1000 UT) TABS tablet Take 1 tablet by mouth daily      azithromycin (ZITHROMAX) 250 MG tablet 500mg on day 1 followed by 250mg on days 2 - 5 6 tablet 0    promethazine-dextromethorphan (PROMETHAZINE-DM) 6.25-15 MG/5ML syrup Take 5 mLs by mouth 4 times daily as needed for Cough 100 mL 0    predniSONE (DELTASONE) 20 MG tablet Take 2 tablets by mouth daily for 5 days 10 tablet 0    CALCIUM PO Take by mouth daily (Patient not taking: Reported on 4/21/2025)      MELATONIN PO Take by mouth at bedtime (Patient not taking: Reported on 4/21/2025)      cyclobenzaprine (FLEXERIL) 5 MG tablet Take 1 tablet by mouth daily (Patient not taking: Reported on 4/21/2025)      losartan (COZAAR) 50 MG tablet Take 1 tablet by mouth daily (Patient not taking: Reported on 4/1/2025)      calcium carbonate (TUMS) 500 MG chewable tablet Take 1 tablet by mouth daily (Patient

## 2025-04-24 NOTE — PROGRESS NOTES
[facility]  Metabolic Weight Loss Program        Patient's Name: Sophia Ceja  : 1955    This patient is a participant at [facility] and attended the weekly virtual nutrition class hosted via [employer].      Melissa Calvo, MS, RD, LDN

## 2025-05-01 ENCOUNTER — OFFICE VISIT (OUTPATIENT)
Age: 70
End: 2025-05-01

## 2025-05-01 DIAGNOSIS — E66.3 OVERWEIGHT (BMI 25.0-29.9): Primary | ICD-10-CM

## 2025-05-04 ENCOUNTER — RESULTS FOLLOW-UP (OUTPATIENT)
Age: 70
End: 2025-05-04

## 2025-05-05 NOTE — PROGRESS NOTES
[facility]  Metabolic Weight Loss Program        Patient's Name: Sophia Ceja  : 1955    This patient is a participant at [facility] and attended the weekly virtual nutrition class hosted via [employer].      Melissa Calvo, MS, RD, LDN

## 2025-05-06 ENCOUNTER — OFFICE VISIT (OUTPATIENT)
Age: 70
End: 2025-05-06

## 2025-05-06 ENCOUNTER — CLINICAL SUPPORT (OUTPATIENT)
Age: 70
End: 2025-05-06

## 2025-05-06 VITALS
WEIGHT: 160.6 LBS | DIASTOLIC BLOOD PRESSURE: 70 MMHG | SYSTOLIC BLOOD PRESSURE: 109 MMHG | RESPIRATION RATE: 16 BRPM | OXYGEN SATURATION: 95 % | HEART RATE: 65 BPM | TEMPERATURE: 98 F | BODY MASS INDEX: 28.46 KG/M2 | HEIGHT: 63 IN

## 2025-05-06 DIAGNOSIS — E66.3 OVERWEIGHT (BMI 25.0-29.9): Primary | ICD-10-CM

## 2025-05-06 DIAGNOSIS — I10 PRIMARY HYPERTENSION: ICD-10-CM

## 2025-05-06 DIAGNOSIS — Z62.819 TRAUMA IN CHILDHOOD: ICD-10-CM

## 2025-05-06 ASSESSMENT — PATIENT HEALTH QUESTIONNAIRE - PHQ9
2. FEELING DOWN, DEPRESSED OR HOPELESS: NOT AT ALL
SUM OF ALL RESPONSES TO PHQ QUESTIONS 1-9: 0
1. LITTLE INTEREST OR PLEASURE IN DOING THINGS: NOT AT ALL
SUM OF ALL RESPONSES TO PHQ QUESTIONS 1-9: 0

## 2025-05-06 NOTE — PROGRESS NOTES
Identified pt with two pt identifiers (name and ). Reviewed chart in preparation for visit and have obtained necessary documentation.    Sophia Ceja is a 69 y.o. female  Chief Complaint   Patient presents with    Weight Management     LCD/ Gallo      /70 (BP Site: Left Upper Arm, Patient Position: Sitting, BP Cuff Size: Large Adult)   Pulse 65   Temp 98 °F (36.7 °C) (Oral)   Resp 16   Ht 1.6 m (5' 3\")   Wt 72.8 kg (160 lb 9.6 oz)   LMP  (LMP Unknown)   SpO2 95%   BMI 28.45 kg/m²     1. Have you been to the ER, urgent care clinic since your last visit?  Hospitalized since your last visit?no    2. Have you seen or consulted any other health care providers outside of the [facility] since your last visit?  Include any pap smears or colon screening. No        Progress Note: Weekly Education Class in the [facility] Weight Loss Program         Patient is on Very Low Calorie Diet [] (4 meal replacements per day, 800 kcal/day)      Low Calorie Diet [x] (2-3 meal replacements per day, 9587-4798 kcal/day)    1) Did patient have any new symptoms or physical problems?   Yes [x]    No []    If yes, check & comment: weakness [], fatigue [], lightheadedness [], headache [], cramps [], cold intolerance [], hair loss [], diarrhea [], constipation [],  NA [] other: unrelated to diet, bronchitis                                2) Has patient had any medical attention from other providers, urgent care or the emergency room this week?  Yes [x]  No []       NA [], If yes, why: [facility]- Bronchitis                                   3) Any other sugar sweetened beverages consumed this week?   Yes [x]  No []    4) Did patient have any problems adhering to the diet? Yes [x]  No [] NA []    If yes, Vacation [], Celebrations [], Conferences [], Family Reunions [] other: out of state friends for 4 days, easter with family                                                5) How many hours of sleep

## 2025-05-06 NOTE — PROGRESS NOTES
[facility]  Metabolic Weight Loss Program        Patient's Name: Sophia Ceja  : 1955    This patient is a participant at [facility] and attended the weekly virtual nutrition class hosted via [employer].      Melissa Calvo, MS, RD, LDN

## 2025-05-12 ENCOUNTER — OFFICE VISIT (OUTPATIENT)
Facility: CLINIC | Age: 70
End: 2025-05-12
Payer: MEDICARE

## 2025-05-12 VITALS
TEMPERATURE: 97.9 F | RESPIRATION RATE: 16 BRPM | HEART RATE: 67 BPM | WEIGHT: 164 LBS | OXYGEN SATURATION: 93 % | HEIGHT: 63 IN | BODY MASS INDEX: 29.06 KG/M2 | DIASTOLIC BLOOD PRESSURE: 80 MMHG | SYSTOLIC BLOOD PRESSURE: 122 MMHG

## 2025-05-12 DIAGNOSIS — N17.9 AKI (ACUTE KIDNEY INJURY): Primary | ICD-10-CM

## 2025-05-12 DIAGNOSIS — Z90.5 H/O LEFT NEPHRECTOMY: ICD-10-CM

## 2025-05-12 DIAGNOSIS — I10 HTN, GOAL BELOW 130/80: ICD-10-CM

## 2025-05-12 PROCEDURE — 3079F DIAST BP 80-89 MM HG: CPT | Performed by: INTERNAL MEDICINE

## 2025-05-12 PROCEDURE — 1036F TOBACCO NON-USER: CPT | Performed by: INTERNAL MEDICINE

## 2025-05-12 PROCEDURE — 3017F COLORECTAL CA SCREEN DOC REV: CPT | Performed by: INTERNAL MEDICINE

## 2025-05-12 PROCEDURE — 1090F PRES/ABSN URINE INCON ASSESS: CPT | Performed by: INTERNAL MEDICINE

## 2025-05-12 PROCEDURE — 1159F MED LIST DOCD IN RCRD: CPT | Performed by: INTERNAL MEDICINE

## 2025-05-12 PROCEDURE — G8417 CALC BMI ABV UP PARAM F/U: HCPCS | Performed by: INTERNAL MEDICINE

## 2025-05-12 PROCEDURE — 99213 OFFICE O/P EST LOW 20 MIN: CPT | Performed by: INTERNAL MEDICINE

## 2025-05-12 PROCEDURE — 1126F AMNT PAIN NOTED NONE PRSNT: CPT | Performed by: INTERNAL MEDICINE

## 2025-05-12 PROCEDURE — 1160F RVW MEDS BY RX/DR IN RCRD: CPT | Performed by: INTERNAL MEDICINE

## 2025-05-12 PROCEDURE — 3074F SYST BP LT 130 MM HG: CPT | Performed by: INTERNAL MEDICINE

## 2025-05-12 PROCEDURE — G8399 PT W/DXA RESULTS DOCUMENT: HCPCS | Performed by: INTERNAL MEDICINE

## 2025-05-12 PROCEDURE — G8427 DOCREV CUR MEDS BY ELIG CLIN: HCPCS | Performed by: INTERNAL MEDICINE

## 2025-05-12 PROCEDURE — 1123F ACP DISCUSS/DSCN MKR DOCD: CPT | Performed by: INTERNAL MEDICINE

## 2025-05-12 NOTE — PROGRESS NOTES
Sophia Ceja (:  1955) is a 69 y.o. female,Established patient, here for evaluation of the following chief complaint(s):  Results (Kidney function test results)         Assessment & Plan  DANY (acute kidney injury)   Discussed inc in bun can reflect gi bleed but recent hgb 15 at urgent care  Can reflect dehydration-advised inc am fluid  intake  Check us to look at structure of right kidney    Orders:    US ABDOMEN COMPLETE; Future    Urinalysis with Microscopic; Future    Basic Metabolic Panel; Future    HTN, goal below 130/80   Cont meds         H/O left nephrectomy              No follow-ups on file.       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  At the time she was told of the presence of a cyst in her right kidney.  Has had no flank pain hematuria or UTI symptoms.  However labs are being checked regularly at the [facility] weight loss clinic and her BUN has trended up from previous baseline of 15 to most recently a BUN of 30.  Creatinine fluctuates but has been roughly around 1.06 over the last 2 years.  She is following a higher protein diet.  Gets her fluids in primarily between 6 and 10 PM and not in the morning.  Is on no new prescription meds.  Avoids NSAIDs.  Is generally feeling well but worried about kidneys.  Has not lost significant weight on our scale.  Blood pressure remains controlled on metoprolol and amlodipine  Review of Systems       Objective   Physical Exam  Constitutional:       Appearance: Normal appearance.   HENT:      Head: Normocephalic and atraumatic.   Cardiovascular:      Rate and Rhythm: Normal rate and regular rhythm.   Pulmonary:      Effort: Pulmonary effort is normal.      Breath sounds: Normal breath sounds.   Musculoskeletal:      Right lower leg: No edema.      Left lower leg: No edema.   Neurological:      General: No focal deficit present.      Mental Status: She is alert and oriented to person, place, and time.   Psychiatric:

## 2025-05-13 ENCOUNTER — OFFICE VISIT (OUTPATIENT)
Age: 70
End: 2025-05-13

## 2025-05-13 DIAGNOSIS — E66.3 OVERWEIGHT (BMI 25.0-29.9): Primary | ICD-10-CM

## 2025-05-13 NOTE — PROGRESS NOTES
[facility]  Metabolic Weight Loss Program        Patient's Name: Sophia Ceja  : 1955    This patient is a participant at [facility] and attended the weekly virtual nutrition class hosted via "[employer].      Melissa Cavlo, MS, RD, LDN

## 2025-05-20 ENCOUNTER — CLINICAL SUPPORT (OUTPATIENT)
Age: 70
End: 2025-05-20

## 2025-05-20 ENCOUNTER — OFFICE VISIT (OUTPATIENT)
Age: 70
End: 2025-05-20
Payer: MEDICARE

## 2025-05-20 VITALS
SYSTOLIC BLOOD PRESSURE: 106 MMHG | RESPIRATION RATE: 16 BRPM | BODY MASS INDEX: 28.88 KG/M2 | WEIGHT: 163 LBS | HEIGHT: 63 IN | TEMPERATURE: 98 F | DIASTOLIC BLOOD PRESSURE: 70 MMHG | OXYGEN SATURATION: 96 % | HEART RATE: 58 BPM

## 2025-05-20 VITALS
BODY MASS INDEX: 29.27 KG/M2 | HEIGHT: 63 IN | HEART RATE: 54 BPM | TEMPERATURE: 98 F | SYSTOLIC BLOOD PRESSURE: 128 MMHG | WEIGHT: 165.2 LBS | DIASTOLIC BLOOD PRESSURE: 66 MMHG | OXYGEN SATURATION: 97 %

## 2025-05-20 DIAGNOSIS — K76.0 METABOLIC DYSFUNCTION-ASSOCIATED STEATOTIC LIVER DISEASE (MASLD): Primary | ICD-10-CM

## 2025-05-20 DIAGNOSIS — I10 PRIMARY HYPERTENSION: ICD-10-CM

## 2025-05-20 DIAGNOSIS — Z62.819 TRAUMA IN CHILDHOOD: ICD-10-CM

## 2025-05-20 DIAGNOSIS — E66.3 OVERWEIGHT (BMI 25.0-29.9): Primary | ICD-10-CM

## 2025-05-20 PROCEDURE — 91200 LIVER ELASTOGRAPHY: CPT | Performed by: NURSE PRACTITIONER

## 2025-05-20 PROCEDURE — 99214 OFFICE O/P EST MOD 30 MIN: CPT | Performed by: NURSE PRACTITIONER

## 2025-05-20 ASSESSMENT — PATIENT HEALTH QUESTIONNAIRE - PHQ9
1. LITTLE INTEREST OR PLEASURE IN DOING THINGS: NOT AT ALL
SUM OF ALL RESPONSES TO PHQ QUESTIONS 1-9: 0
SUM OF ALL RESPONSES TO PHQ QUESTIONS 1-9: 0
2. FEELING DOWN, DEPRESSED OR HOPELESS: NOT AT ALL
SUM OF ALL RESPONSES TO PHQ QUESTIONS 1-9: 0
SUM OF ALL RESPONSES TO PHQ QUESTIONS 1-9: 0

## 2025-05-20 NOTE — PROGRESS NOTES
Chief Complaint   Patient presents with    Follow-up     fibroscan     Vitals:    05/20/25 1311   BP: 128/66   BP Site: Left Upper Arm   Patient Position: Sitting   Pulse: 54   Temp: 98 °F (36.7 °C)   TempSrc: Temporal   SpO2: 97%   Weight: 74.9 kg (165 lb 3.2 oz)   Height: 1.6 m (5' 3\")     .  \"Have you been to the ER, urgent care clinic since your last visit?  Hospitalized since your last visit?\"    YES - When: approximately 1/2025 ago.  Where and Why: urgent care .    “Have you seen or consulted any other health care providers outside of [facility] since your last visit?”    NO            Click Here for Release of Records Request    
(LIPITOR) 20 mg, Oral, DAILY    CALCIUM PO DAILY    FLUoxetine (PROZAC) 10 MG capsule Oral, DAILY    gabapentin (NEURONTIN) 100 mg, AS NEEDED    Melatonin 5 MG CAPS 1 tablet, NIGHTLY PRN    metoprolol succinate (TOPROL XL) 100 mg, Oral, DAILY    traZODone (DESYREL) 50 mg, Oral, NIGHTLY    vitamin D (CHOLECALCIFEROL) 1,000 Units, DAILY         SYSTEM REVIEW NOT RELATED TO LIVER DISEASE OR REVIEWED ABOVE:  Constitution systems: Negative for fever, chills, weight gain, weight loss.   Eyes: Negative for visual changes.  ENT: Negative for sore throat, painful swallowing.   Respiratory: Negative for cough, hemoptysis, SOB.   Cardiology: Negative for chest pain, palpitations.  GI:  Negative for constipation or diarrhea.  : Negative for urinary frequency, dysuria, hematuria, nocturia.   Skin: Negative for rash.  Hematology: Negative for easy bruising, blood clots.    Musculo-skelatal: Negative for back pain, muscle pain, weakness.  Neurologic: Negative for headaches, dizziness, vertigo, memory problems not related to HE.  Psychology: Negative for anxiety, depression.       FAMILY HISTORY:  The father  of COVID.    The mother  of ovarian cancer.    There is no family history of liver disease.      SOCIAL HISTORY:  The patient is .    The patient has 2 children,   The patient has never used tobacco products.    The patient has never consumed significant amounts of alcohol.    The patient used to work as  at [employer].        PHYSICAL EXAMINATION:  /66 (BP Site: Left Upper Arm, Patient Position: Sitting)   Pulse 54   Temp 98 °F (36.7 °C) (Temporal)   Ht 1.6 m (5' 3\")   Wt 74.9 kg (165 lb 3.2 oz)   LMP  (LMP Unknown)   SpO2 97%   BMI 29.26 kg/m²       General: No acute distress.   Eyes: Sclera anicteric.   ENT: No oral lesions.  Thyroid normal.  Nodes: No adenopathy.   Skin: No spider angiomata.  No jaundice.  No palmar erythema.  Respiratory: Lungs clear to auscultation.   Cardiovascular:

## 2025-05-20 NOTE — PROGRESS NOTES
Identified pt with two pt identifiers (name and ). Reviewed chart in preparation for visit and have obtained necessary documentation.    Sophia Ceja is a 69 y.o. female  Chief Complaint   Patient presents with    Weight Management     LCD     /70 (BP Site: Left Upper Arm, Patient Position: Sitting, BP Cuff Size: Large Adult)   Pulse 58   Temp 98 °F (36.7 °C) (Oral)   Resp 16   Ht 1.6 m (5' 3\")   Wt 73.9 kg (163 lb)   LMP  (LMP Unknown)   SpO2 96%   BMI 28.87 kg/m²     1. Have you been to the ER, urgent care clinic since your last visit?  Hospitalized since your last visit?no    2. Have you seen or consulted any other health care providers outside of the [facility] since your last visit?  Include any pap smears or colon screening. No    Patient attended triage but did not bring homework form. Patient instructed to email or fax completed homework form to us. Patient informed that not bringing the homework form can result in not being seen next time.

## 2025-05-21 DIAGNOSIS — N17.9 AKI (ACUTE KIDNEY INJURY): ICD-10-CM

## 2025-05-21 LAB
ANION GAP SERPL CALC-SCNC: 4 MMOL/L (ref 2–12)
APPEARANCE UR: ABNORMAL
BACTERIA URNS QL MICRO: NEGATIVE /HPF
BILIRUB UR QL: NEGATIVE
BUN SERPL-MCNC: 23 MG/DL (ref 6–20)
BUN/CREAT SERPL: 25 (ref 12–20)
CALCIUM SERPL-MCNC: 9.7 MG/DL (ref 8.5–10.1)
CHLORIDE SERPL-SCNC: 107 MMOL/L (ref 97–108)
CO2 SERPL-SCNC: 30 MMOL/L (ref 21–32)
COLOR UR: ABNORMAL
CREAT SERPL-MCNC: 0.91 MG/DL (ref 0.55–1.02)
EPITH CASTS URNS QL MICRO: ABNORMAL /LPF
GLUCOSE SERPL-MCNC: 101 MG/DL (ref 65–100)
GLUCOSE UR STRIP.AUTO-MCNC: NEGATIVE MG/DL
HGB UR QL STRIP: NEGATIVE
HYALINE CASTS URNS QL MICRO: ABNORMAL /LPF (ref 0–5)
KETONES UR QL STRIP.AUTO: NEGATIVE MG/DL
LEUKOCYTE ESTERASE UR QL STRIP.AUTO: NEGATIVE
NITRITE UR QL STRIP.AUTO: NEGATIVE
PH UR STRIP: 5.5 (ref 5–8)
POTASSIUM SERPL-SCNC: 4.1 MMOL/L (ref 3.5–5.1)
PROT UR STRIP-MCNC: NEGATIVE MG/DL
RBC #/AREA URNS HPF: ABNORMAL /HPF (ref 0–5)
SODIUM SERPL-SCNC: 141 MMOL/L (ref 136–145)
SP GR UR REFRACTOMETRY: 1.02 (ref 1–1.03)
UROBILINOGEN UR QL STRIP.AUTO: 0.2 EU/DL (ref 0.2–1)
WBC URNS QL MICRO: ABNORMAL /HPF (ref 0–4)

## 2025-05-22 ENCOUNTER — HOSPITAL ENCOUNTER (OUTPATIENT)
Facility: HOSPITAL | Age: 70
Discharge: HOME | End: 2025-05-25
Attending: INTERNAL MEDICINE
Payer: MEDICARE

## 2025-05-22 ENCOUNTER — RESULTS FOLLOW-UP (OUTPATIENT)
Facility: CLINIC | Age: 70
End: 2025-05-22

## 2025-05-22 DIAGNOSIS — N17.9 AKI (ACUTE KIDNEY INJURY): ICD-10-CM

## 2025-05-22 PROCEDURE — 76700 US EXAM ABDOM COMPLETE: CPT

## 2025-05-26 DIAGNOSIS — E78.5 DYSLIPIDEMIA, GOAL LDL BELOW 100: ICD-10-CM

## 2025-05-26 RX ORDER — ATORVASTATIN CALCIUM 20 MG/1
20 TABLET, FILM COATED ORAL DAILY
Qty: 90 TABLET | Refills: 2 | Status: SHIPPED | OUTPATIENT
Start: 2025-05-26

## 2025-05-28 NOTE — PROGRESS NOTES
Nurse note from patient's weekly / LCD / Maintenance class was reviewed.  Pertinent medical concerns were:   reviewed   No homework  BP Readings from Last 3 Encounters:   05/20/25 128/66   05/20/25 106/70   05/12/25 122/80       Failed to redirect to the Timeline version of the [facility] SmartLink.    Current Outpatient Medications   Medication Sig Dispense Refill    traZODone (DESYREL) 50 MG tablet TAKE 1 TABLET BY MOUTH NIGHTLY 90 tablet 1    ACETAMINOPHEN PO Take by mouth as needed      metoprolol succinate (TOPROL XL) 100 MG extended release tablet TAKE 1 TABLET BY MOUTH EVERY DAY 90 tablet 1    amLODIPine (NORVASC) 2.5 MG tablet TAKE 1 TABLET BY MOUTH EVERY DAY 90 tablet 1    FLUoxetine (PROZAC) 10 MG capsule TAKE 1 CAPSULE BY MOUTH EVERY DAY 90 capsule 1    gabapentin (NEURONTIN) 100 MG capsule Take 1 capsule by mouth as needed.      Melatonin 5 MG CAPS Take 1 tablet by mouth nightly as needed (insomnia) 5-10 mg as needed      CALCIUM PO Take by mouth daily      vitamin D (CHOLECALCIFEROL) 25 MCG (1000 UT) TABS tablet Take 1 tablet by mouth daily      atorvastatin (LIPITOR) 20 MG tablet TAKE 1 TABLET BY MOUTH EVERY DAY 90 tablet 2     No current facility-administered medications for this visit.

## 2025-06-03 ENCOUNTER — OFFICE VISIT (OUTPATIENT)
Age: 70
End: 2025-06-03

## 2025-06-03 DIAGNOSIS — E66.3 OVERWEIGHT (BMI 25.0-29.9): Primary | ICD-10-CM

## 2025-06-06 NOTE — PROGRESS NOTES
[facility]  Metabolic Weight Loss Program        Patient's Name: Sophia Ceja  : 1955    This patient is a participant at [facility] and attended the weekly virtual nutrition class hosted via [facility].      Melissa Calvo, MS, RD, LDN

## 2025-06-10 ENCOUNTER — OFFICE VISIT (OUTPATIENT)
Age: 70
End: 2025-06-10

## 2025-06-10 DIAGNOSIS — E66.3 OVERWEIGHT (BMI 25.0-29.9): Primary | ICD-10-CM

## 2025-06-11 ENCOUNTER — OFFICE VISIT (OUTPATIENT)
Age: 70
End: 2025-06-11
Payer: MEDICARE

## 2025-06-11 VITALS
HEART RATE: 65 BPM | DIASTOLIC BLOOD PRESSURE: 72 MMHG | HEIGHT: 63 IN | BODY MASS INDEX: 28.69 KG/M2 | OXYGEN SATURATION: 95 % | WEIGHT: 161.9 LBS | SYSTOLIC BLOOD PRESSURE: 123 MMHG | TEMPERATURE: 98.1 F | RESPIRATION RATE: 18 BRPM

## 2025-06-11 DIAGNOSIS — E66.3 OVERWEIGHT (BMI 25.0-29.9): Primary | ICD-10-CM

## 2025-06-11 DIAGNOSIS — Z62.819 TRAUMA IN CHILDHOOD: ICD-10-CM

## 2025-06-11 DIAGNOSIS — Z90.5 H/O LEFT NEPHRECTOMY: ICD-10-CM

## 2025-06-11 DIAGNOSIS — I10 PRIMARY HYPERTENSION: ICD-10-CM

## 2025-06-11 PROCEDURE — 3074F SYST BP LT 130 MM HG: CPT | Performed by: FAMILY MEDICINE

## 2025-06-11 PROCEDURE — G8399 PT W/DXA RESULTS DOCUMENT: HCPCS | Performed by: FAMILY MEDICINE

## 2025-06-11 PROCEDURE — 3017F COLORECTAL CA SCREEN DOC REV: CPT | Performed by: FAMILY MEDICINE

## 2025-06-11 PROCEDURE — 1126F AMNT PAIN NOTED NONE PRSNT: CPT | Performed by: FAMILY MEDICINE

## 2025-06-11 PROCEDURE — 3078F DIAST BP <80 MM HG: CPT | Performed by: FAMILY MEDICINE

## 2025-06-11 PROCEDURE — 1159F MED LIST DOCD IN RCRD: CPT | Performed by: FAMILY MEDICINE

## 2025-06-11 PROCEDURE — 1090F PRES/ABSN URINE INCON ASSESS: CPT | Performed by: FAMILY MEDICINE

## 2025-06-11 PROCEDURE — G8427 DOCREV CUR MEDS BY ELIG CLIN: HCPCS | Performed by: FAMILY MEDICINE

## 2025-06-11 PROCEDURE — G8417 CALC BMI ABV UP PARAM F/U: HCPCS | Performed by: FAMILY MEDICINE

## 2025-06-11 PROCEDURE — 1123F ACP DISCUSS/DSCN MKR DOCD: CPT | Performed by: FAMILY MEDICINE

## 2025-06-11 PROCEDURE — 1036F TOBACCO NON-USER: CPT | Performed by: FAMILY MEDICINE

## 2025-06-11 PROCEDURE — 99213 OFFICE O/P EST LOW 20 MIN: CPT | Performed by: FAMILY MEDICINE

## 2025-06-11 RX ORDER — OFLOXACIN 3 MG/ML
3 SOLUTION/ DROPS OPHTHALMIC 4 TIMES DAILY
COMMUNITY
Start: 2025-06-05

## 2025-06-11 ASSESSMENT — PATIENT HEALTH QUESTIONNAIRE - PHQ9
SUM OF ALL RESPONSES TO PHQ QUESTIONS 1-9: 0
SUM OF ALL RESPONSES TO PHQ QUESTIONS 1-9: 0
2. FEELING DOWN, DEPRESSED OR HOPELESS: NOT AT ALL
1. LITTLE INTEREST OR PLEASURE IN DOING THINGS: NOT AT ALL
SUM OF ALL RESPONSES TO PHQ QUESTIONS 1-9: 0
SUM OF ALL RESPONSES TO PHQ QUESTIONS 1-9: 0

## 2025-06-11 NOTE — PROGRESS NOTES
Identified pt with two pt identifiers (name and ). Reviewed chart in preparation for visit and have obtained necessary documentation.    Sophia Ceja is a 69 y.o. female  Chief Complaint   Patient presents with    Weight Management     LCD/ 1 month      /72 (BP Site: Left Upper Arm, Patient Position: Sitting, BP Cuff Size: Small Adult)   Pulse 65   Temp 98.1 °F (36.7 °C) (Oral)   Resp 18   Ht 1.6 m (5' 3\")   Wt 73.4 kg (161 lb 14.4 oz)   LMP  (LMP Unknown)   SpO2 95%   BMI 28.68 kg/m²     1. Have you been to the ER, urgent care clinic since your last visit?  Hospitalized since your last visit?no    2. Have you seen or consulted any other health care providers outside of the [facility] since your last visit?  Include any pap smears or colon screening. No    Patient stated 2 weeks ago she had laryngitis.         Progress Note: Weekly Education Class in the [facility]         Patient is on Very Low Calorie Diet [] (4 meal replacements per day, 800 kcal/day)      Low Calorie Diet [x] (2-3 meal replacements per day, 9742-8085 kcal/day)    1) Did patient have any new symptoms or physical problems?   Yes [x]    No []    If yes, check & comment: weakness [], fatigue [], lightheadedness [], headache [], cramps [], cold intolerance [], hair loss [], diarrhea [], constipation [],  NA [] other: Back pain- back on gabapentin                                2) Has patient had any medical attention from other providers, urgent care or the emergency room this week?  Yes []  No [x]       NA [], If yes, why:                                       3) Any other sugar sweetened beverages consumed this week?   Yes []  No [x]    4) Did patient have any problems adhering to the diet? Yes [x]  No [] NA []    If yes, Vacation [], Celebrations [], Conferences [], Family Reunions [] other: lost motivation                                                5) How many hours of sleep this 
Ht 1.6 m (5' 3\")   Wt 73.4 kg (161 lb 14.4 oz)   LMP  (LMP Unknown)   SpO2 95%   BMI 28.68 kg/m²   No LMP recorded (lmp unknown). Patient is postmenopausal.      Physical Exam  Appearance: well,   Mental:A&O x 3, NAD  H:NC/AT,  EENT:   EOMI, PERRL, No scleral icterus  Neck: no bruit or JVD  CV: RRR no M/R/G  Lung: clear, No W/R  ABD: soft, active, nontender  Ext:  no Edema  Neuro: nonfocal          Assessment / Plan    Sophia Ceja was seen today for Weight Management (LCD/ 1 month )         1. Overweight (BMI 25.0-29.9)    Movement  aim for consistency with the 30 min workout at least 3 days a week  Meds none for appetite  Eating plan    continue  the low sharmin eating plan using 2 robard meal replacements and one low carb meal     Sleep  6-7  Stress referred to therapy to help prevent self soothing with food but the therapist is not addressing the issues . She was given names of other therapists. Also can ask ethos to switch her to someone else       I counseled her for more than 50% of this more than 20 min visit on the ways to meet goals for moving right, eating right an sleeping right to get weight loss results     2. Trauma in childhood  Referred to therapy to prevent self soothing with food  3. Primary hypertension  Controlled on current meds  Amlodipine 2.5 mg a day  Metoprolol xl 100 mg a day      4. H/O left nephrectomy  Normal creat now     1.  Weight management improved   Progress was reviewed with patient    2.  Labs    Latest results reviewed with patient       face to face time with Sophia consisted of counseling & coordinating and/or discussing treatment plans in reference to her obesity The primary encounter diagnosis was Overweight (BMI 25.0-29.9). Diagnoses of Trauma in childhood, Primary hypertension, and H/O left nephrectomy were also pertinent to this visit.

## 2025-06-16 NOTE — PROGRESS NOTES
[facility]  Metabolic Weight Loss Program        Patient's Name: Sophia Ceja  : 1955    This patient is a participant at [facility] and attended the weekly virtual nutrition class hosted via [employer].      Melissa Calvo, MS, RD, LDN

## 2025-06-17 ENCOUNTER — OFFICE VISIT (OUTPATIENT)
Age: 70
End: 2025-06-17

## 2025-06-17 DIAGNOSIS — E66.3 OVERWEIGHT (BMI 25.0-29.9): Primary | ICD-10-CM

## 2025-06-17 NOTE — PROGRESS NOTES
[facility]  Metabolic Weight Loss Program        Patient's Name: Sophia Ceja  : 1955    This patient is a participant at [facility] and attended the weekly virtual nutrition class hosted via [employer].      Melissa Calvo, MS, RD, LDN

## 2025-06-23 ENCOUNTER — TELEPHONE (OUTPATIENT)
Age: 70
End: 2025-06-23

## 2025-06-23 NOTE — TELEPHONE ENCOUNTER
Identified patient with two patient identifiers (name and ). Reviewed chart in preparation for encounter and have obtained necessary documentation.     Patient called in to cancel appt for tomorrow, will call back to reschedule.

## 2025-06-24 ENCOUNTER — OFFICE VISIT (OUTPATIENT)
Age: 70
End: 2025-06-24

## 2025-06-24 DIAGNOSIS — E66.3 OVERWEIGHT (BMI 25.0-29.9): Primary | ICD-10-CM

## 2025-06-24 NOTE — PROGRESS NOTES
[facility]  Metabolic Weight Loss Program        Patient's Name: Sophia Ceja  : 1955    This patient is a participant at [facility] and attended the weekly virtual nutrition class hosted via [employer].      Melissa Calvo, MS, RD, LDN

## 2025-06-28 ENCOUNTER — RESULTS FOLLOW-UP (OUTPATIENT)
Age: 70
End: 2025-06-28

## 2025-07-03 ENCOUNTER — TELEPHONE (OUTPATIENT)
Age: 70
End: 2025-07-03

## 2025-07-08 ENCOUNTER — CLINICAL SUPPORT (OUTPATIENT)
Age: 70
End: 2025-07-08

## 2025-07-08 ENCOUNTER — OFFICE VISIT (OUTPATIENT)
Age: 70
End: 2025-07-08

## 2025-07-08 VITALS
WEIGHT: 161.8 LBS | HEART RATE: 60 BPM | RESPIRATION RATE: 16 BRPM | DIASTOLIC BLOOD PRESSURE: 70 MMHG | SYSTOLIC BLOOD PRESSURE: 110 MMHG | HEIGHT: 63 IN | OXYGEN SATURATION: 98 % | TEMPERATURE: 97.8 F | BODY MASS INDEX: 28.67 KG/M2

## 2025-07-08 DIAGNOSIS — I10 PRIMARY HYPERTENSION: ICD-10-CM

## 2025-07-08 DIAGNOSIS — E66.3 OVERWEIGHT (BMI 25.0-29.9): Primary | ICD-10-CM

## 2025-07-08 DIAGNOSIS — Z62.819 TRAUMA IN CHILDHOOD: ICD-10-CM

## 2025-07-08 RX ORDER — CETIRIZINE HYDROCHLORIDE 5 MG/1
5 TABLET ORAL AS NEEDED
COMMUNITY

## 2025-07-08 ASSESSMENT — PATIENT HEALTH QUESTIONNAIRE - PHQ9
SUM OF ALL RESPONSES TO PHQ QUESTIONS 1-9: 0
2. FEELING DOWN, DEPRESSED OR HOPELESS: NOT AT ALL
SUM OF ALL RESPONSES TO PHQ QUESTIONS 1-9: 0
1. LITTLE INTEREST OR PLEASURE IN DOING THINGS: NOT AT ALL

## 2025-07-08 NOTE — PROGRESS NOTES
Identified pt with two pt identifiers (name and ). Reviewed chart in preparation for visit and have obtained necessary documentation.    Sophia Ceja is a 69 y.o. female  Chief Complaint   Patient presents with    Weight Management     LCD/ Gallo      /70 (BP Site: Left Upper Arm, Patient Position: Sitting, BP Cuff Size: Small Adult)   Pulse 60   Temp 97.8 °F (36.6 °C) (Oral)   Resp 16   Ht 1.6 m (5' 3\")   Wt 73.4 kg (161 lb 12.8 oz)   LMP  (LMP Unknown)   SpO2 98%   BMI 28.66 kg/m²     1. Have you been to the ER, urgent care clinic since your last visit?  Hospitalized since your last visit?no    2. Have you seen or consulted any other health care providers outside of the [facility] since your last visit?  Include any pap smears or colon screening. No    Patient entered their homework via the [employer].

## 2025-07-10 NOTE — PROGRESS NOTES
[facility]  Metabolic Weight Loss Program        Patient's Name: Sophia Ceja  : 1955    This patient is a participant at [facility] and attended the weekly virtual nutrition class hosted via [employer]      Melissa Calvo, MS, RD, LDN

## 2025-07-15 ENCOUNTER — OFFICE VISIT (OUTPATIENT)
Age: 70
End: 2025-07-15

## 2025-07-15 DIAGNOSIS — E66.3 OVERWEIGHT (BMI 25.0-29.9): Primary | ICD-10-CM

## 2025-07-17 NOTE — PROGRESS NOTES
Nurse note from patient's weekly  LCD / Maintenance class was reviewed.  Pertinent medical concerns were:   reviewed     BP Readings from Last 3 Encounters:   07/08/25 110/70   06/11/25 123/72   05/20/25 128/66       Failed to redirect [facility] the [facility].    Current Outpatient Medications   Medication Sig Dispense Refill    cetirizine (ZYRTEC) 5 MG tablet Take 1 tablet by mouth as needed for Allergies      atorvastatin (LIPITOR) 20 MG tablet TAKE 1 TABLET BY MOUTH EVERY DAY 90 tablet 2    traZODone (DESYREL) 50 MG tablet TAKE 1 TABLET BY MOUTH NIGHTLY 90 tablet 1    ACETAMINOPHEN PO Take by mouth as needed      metoprolol succinate (TOPROL XL) 100 MG extended release tablet TAKE 1 TABLET BY MOUTH EVERY DAY 90 tablet 1    amLODIPine (NORVASC) 2.5 MG tablet TAKE 1 TABLET BY MOUTH EVERY DAY 90 tablet 1    FLUoxetine (PROZAC) 10 MG capsule TAKE 1 CAPSULE BY MOUTH EVERY DAY 90 capsule 1    gabapentin (NEURONTIN) 100 MG capsule Take 1 capsule by mouth as needed.      Melatonin 5 MG CAPS Take 1 tablet by mouth nightly as needed (insomnia) 5-10 mg as needed      CALCIUM PO Take by mouth daily      vitamin D (CHOLECALCIFEROL) 25 MCG (1000 UT) TABS tablet Take 1 tablet by mouth daily      ofloxacin (OCUFLOX) 0.3 % solution Place 3 drops into both eyes 4 times daily (Patient not taking: Reported on 7/8/2025)       No current facility-administered medications for this visit.

## 2025-07-18 NOTE — PROGRESS NOTES
[facility]  Metabolic Weight Loss Program        Patient's Name: Sophia Ceja  : 1955    This patient is a participant at [facility] and attended the weekly virtual nutrition class hosted via [employer].      Melissa Calvo, MS, RD, LDN

## 2025-07-22 ENCOUNTER — CLINICAL SUPPORT (OUTPATIENT)
Age: 70
End: 2025-07-22

## 2025-07-22 ENCOUNTER — OFFICE VISIT (OUTPATIENT)
Age: 70
End: 2025-07-22

## 2025-07-22 VITALS
HEART RATE: 60 BPM | RESPIRATION RATE: 16 BRPM | TEMPERATURE: 98 F | OXYGEN SATURATION: 97 % | WEIGHT: 163 LBS | BODY MASS INDEX: 28.88 KG/M2 | DIASTOLIC BLOOD PRESSURE: 76 MMHG | HEIGHT: 63 IN | SYSTOLIC BLOOD PRESSURE: 132 MMHG

## 2025-07-22 DIAGNOSIS — E66.3 OVERWEIGHT (BMI 25.0-29.9): Primary | ICD-10-CM

## 2025-07-22 DIAGNOSIS — Z62.819 TRAUMA IN CHILDHOOD: ICD-10-CM

## 2025-07-22 DIAGNOSIS — I10 PRIMARY HYPERTENSION: ICD-10-CM

## 2025-07-22 DIAGNOSIS — Z90.5 H/O LEFT NEPHRECTOMY: ICD-10-CM

## 2025-07-22 ASSESSMENT — PATIENT HEALTH QUESTIONNAIRE - PHQ9
2. FEELING DOWN, DEPRESSED OR HOPELESS: NOT AT ALL
SUM OF ALL RESPONSES TO PHQ QUESTIONS 1-9: 0
SUM OF ALL RESPONSES TO PHQ QUESTIONS 1-9: 0
1. LITTLE INTEREST OR PLEASURE IN DOING THINGS: NOT AT ALL
SUM OF ALL RESPONSES TO PHQ QUESTIONS 1-9: 0
SUM OF ALL RESPONSES TO PHQ QUESTIONS 1-9: 0

## 2025-07-22 NOTE — PROGRESS NOTES
Identified pt with two pt identifiers (name and ). Reviewed chart in preparation for visit and have obtained necessary documentation.    Sophia Ceja is a 69 y.o. female  Chief Complaint   Patient presents with    Weight Management     LCD/ Gallo      /76 (BP Site: Left Upper Arm, Patient Position: Sitting, BP Cuff Size: Small Adult)   Pulse 60   Temp 98 °F (36.7 °C) (Oral)   Resp 16   Ht 1.6 m (5' 3\")   Wt 73.9 kg (163 lb)   LMP  (LMP Unknown)   SpO2 97%   BMI 28.87 kg/m²     1. Have you been to the ER, urgent care clinic since your last visit?  Hospitalized since your last visit?no    2. Have you seen or consulted any other health care providers outside of the [facility] since your last visit?  Include any pap smears or colon screening. No    Patient entered their homework via the [employer].

## 2025-07-25 ENCOUNTER — TELEPHONE (OUTPATIENT)
Facility: CLINIC | Age: 70
End: 2025-07-25

## 2025-07-25 NOTE — PROGRESS NOTES
[facility]  Metabolic Weight Loss Program        Patient's Name: Sophia Ceja  : 1955    This patient is a participant at [facility] and attended the weekly virtual nutrition class hosted via [employer].      Melissa Calvo, MS, RD, LDN

## 2025-07-25 NOTE — TELEPHONE ENCOUNTER
Attempted to contact patient regarding upcoming Medicare wellness appointment and completion of HRA questionnaire. LVM for patient to please return call at  [phone]

## 2025-07-26 DIAGNOSIS — F41.9 ANXIETY: ICD-10-CM

## 2025-07-28 ENCOUNTER — OFFICE VISIT (OUTPATIENT)
Facility: CLINIC | Age: 70
End: 2025-07-28
Payer: MEDICARE

## 2025-07-28 VITALS
RESPIRATION RATE: 16 BRPM | BODY MASS INDEX: 28.7 KG/M2 | HEART RATE: 63 BPM | DIASTOLIC BLOOD PRESSURE: 74 MMHG | TEMPERATURE: 97.9 F | WEIGHT: 162 LBS | OXYGEN SATURATION: 94 % | SYSTOLIC BLOOD PRESSURE: 116 MMHG | HEIGHT: 63 IN

## 2025-07-28 DIAGNOSIS — K76.0 METABOLIC DYSFUNCTION-ASSOCIATED STEATOTIC LIVER DISEASE (MASLD): ICD-10-CM

## 2025-07-28 DIAGNOSIS — I10 HTN, GOAL BELOW 130/80: ICD-10-CM

## 2025-07-28 DIAGNOSIS — F41.9 ANXIETY: ICD-10-CM

## 2025-07-28 DIAGNOSIS — F51.02 ADJUSTMENT INSOMNIA: ICD-10-CM

## 2025-07-28 DIAGNOSIS — E78.5 DYSLIPIDEMIA, GOAL LDL BELOW 100: ICD-10-CM

## 2025-07-28 DIAGNOSIS — Z00.00 MEDICARE ANNUAL WELLNESS VISIT, SUBSEQUENT: Primary | ICD-10-CM

## 2025-07-28 DIAGNOSIS — Z12.31 BREAST CANCER SCREENING BY MAMMOGRAM: ICD-10-CM

## 2025-07-28 PROCEDURE — 1123F ACP DISCUSS/DSCN MKR DOCD: CPT | Performed by: INTERNAL MEDICINE

## 2025-07-28 PROCEDURE — 1160F RVW MEDS BY RX/DR IN RCRD: CPT | Performed by: INTERNAL MEDICINE

## 2025-07-28 PROCEDURE — 3074F SYST BP LT 130 MM HG: CPT | Performed by: INTERNAL MEDICINE

## 2025-07-28 PROCEDURE — 3078F DIAST BP <80 MM HG: CPT | Performed by: INTERNAL MEDICINE

## 2025-07-28 PROCEDURE — G8399 PT W/DXA RESULTS DOCUMENT: HCPCS | Performed by: INTERNAL MEDICINE

## 2025-07-28 PROCEDURE — G8417 CALC BMI ABV UP PARAM F/U: HCPCS | Performed by: INTERNAL MEDICINE

## 2025-07-28 PROCEDURE — 1126F AMNT PAIN NOTED NONE PRSNT: CPT | Performed by: INTERNAL MEDICINE

## 2025-07-28 PROCEDURE — 99214 OFFICE O/P EST MOD 30 MIN: CPT | Performed by: INTERNAL MEDICINE

## 2025-07-28 PROCEDURE — 1159F MED LIST DOCD IN RCRD: CPT | Performed by: INTERNAL MEDICINE

## 2025-07-28 PROCEDURE — G8427 DOCREV CUR MEDS BY ELIG CLIN: HCPCS | Performed by: INTERNAL MEDICINE

## 2025-07-28 PROCEDURE — 3017F COLORECTAL CA SCREEN DOC REV: CPT | Performed by: INTERNAL MEDICINE

## 2025-07-28 PROCEDURE — 1090F PRES/ABSN URINE INCON ASSESS: CPT | Performed by: INTERNAL MEDICINE

## 2025-07-28 PROCEDURE — G0439 PPPS, SUBSEQ VISIT: HCPCS | Performed by: INTERNAL MEDICINE

## 2025-07-28 PROCEDURE — 1036F TOBACCO NON-USER: CPT | Performed by: INTERNAL MEDICINE

## 2025-07-28 RX ORDER — GABAPENTIN 100 MG/1
100 CAPSULE ORAL
Qty: 90 CAPSULE | Refills: 0 | Status: SHIPPED | OUTPATIENT
Start: 2025-07-28 | End: 2025-10-26

## 2025-07-28 ASSESSMENT — PATIENT HEALTH QUESTIONNAIRE - PHQ9
SUM OF ALL RESPONSES TO PHQ QUESTIONS 1-9: 0
2. FEELING DOWN, DEPRESSED OR HOPELESS: NOT AT ALL
SUM OF ALL RESPONSES TO PHQ QUESTIONS 1-9: 0
SUM OF ALL RESPONSES TO PHQ QUESTIONS 1-9: 0
1. LITTLE INTEREST OR PLEASURE IN DOING THINGS: NOT AT ALL
SUM OF ALL RESPONSES TO PHQ QUESTIONS 1-9: 0

## 2025-07-28 ASSESSMENT — LIFESTYLE VARIABLES
HOW MANY STANDARD DRINKS CONTAINING ALCOHOL DO YOU HAVE ON A TYPICAL DAY: 1 OR 2
HOW OFTEN DO YOU HAVE A DRINK CONTAINING ALCOHOL: 2-4 TIMES A MONTH

## 2025-07-28 NOTE — PROGRESS NOTES
Sophia Ceja (:  1955) is a 69 y.o. female,Established patient, here for evaluation of the following chief complaint(s):  Medicare AWV (AWV)         Assessment & Plan  Medicare annual wellness visit, subsequent   Cont  mammograms  Dexa in  osteopenia  Exercise discussed   Pap in  normal  Colonoscopy in last 5 yrs and no new sxs         Dyslipidemia, goal LDL below 100   Cont the lipitor 20 mg    Orders:    Comprehensive Metabolic Panel; Future    Lipid Panel; Future    HTN, goal below 130/80   Bp on low end of goal   Cont the toprol and stop the norvasc  Appt in 3 mo         Anxiety   Cont prozac 10 mg         Metabolic dysfunction-associated steatotic liver disease (MASLD)   stable         Adjustment insomnia   Current issue  Trial of nightly gabapentin  Side effects possible reviewed in detail      Orders:    gabapentin (NEURONTIN) 100 MG capsule; Take 1 capsule by mouth nightly for 90 days. Max Daily Amount: 100 mg    Breast cancer screening by mammogram       Orders:    DAE ELLIE DIGITAL SCREEN BILATERAL; Future      No follow-ups on file.       Subjective   HPI  Seen for wellness visit but has concerns.  Main issue is insomnia.  Trazodone has stopped working.  She does not drink.  Tries to exercise but has been disrupted by the heat.  Typically walks for exercise.  No stimulants.  Uses gabapentin as needed her back but has never used it on a standing basis.  No cardiac symptoms.  No pulmonary symptoms.  Looking forward to travel in the fall.  Review of Systems       Objective   Physical Exam  Constitutional:       Appearance: Normal appearance.   HENT:      Head: Normocephalic and atraumatic.      Right Ear: Tympanic membrane normal.      Left Ear: Tympanic membrane normal.   Cardiovascular:      Rate and Rhythm: Normal rate and regular rhythm.   Pulmonary:      Effort: Pulmonary effort is normal.      Breath sounds: Normal breath sounds.   Abdominal:      Palpations: Abdomen is soft.

## 2025-07-29 RX ORDER — FLUOXETINE 10 MG/1
CAPSULE ORAL DAILY
Qty: 90 CAPSULE | Refills: 1 | Status: SHIPPED | OUTPATIENT
Start: 2025-07-29

## 2025-07-30 NOTE — PROGRESS NOTES
Nurse note from patient's weekly  / LCD / Maintenance class was reviewed.  Pertinent medical concerns were:   reviewed     BP Readings from Last 3 Encounters:   07/28/25 116/74   07/22/25 132/76   07/08/25 110/70       [facility].    Current Outpatient Medications   Medication Sig Dispense Refill    cetirizine (ZYRTEC) 5 MG tablet Take 1 tablet by mouth as needed for Allergies      atorvastatin (LIPITOR) 20 MG tablet TAKE 1 TABLET BY MOUTH EVERY DAY 90 tablet 2    ACETAMINOPHEN PO Take by mouth as needed      metoprolol succinate (TOPROL XL) 100 MG extended release tablet TAKE 1 TABLET BY MOUTH EVERY DAY 90 tablet 1    Melatonin 5 MG CAPS Take 1 tablet by mouth nightly as needed (insomnia) 5-10 mg as needed      CALCIUM PO Take by mouth daily      vitamin D (CHOLECALCIFEROL) 25 MCG (1000 UT) TABS tablet Take 1 tablet by mouth daily      FLUoxetine (PROZAC) 10 MG capsule TAKE 1 CAPSULE BY MOUTH EVERY DAY 90 capsule 1    gabapentin (NEURONTIN) 100 MG capsule Take 1 capsule by mouth nightly for 90 days. Max Daily Amount: 100 mg 90 capsule 0     No current facility-administered medications for this visit.

## 2025-07-31 ENCOUNTER — TELEPHONE (OUTPATIENT)
Age: 70
End: 2025-07-31

## 2025-07-31 DIAGNOSIS — E78.5 DYSLIPIDEMIA, GOAL LDL BELOW 100: ICD-10-CM

## 2025-07-31 NOTE — TELEPHONE ENCOUNTER
Identified patient with two patient identifiers (name and ). Reviewed chart in preparation for encounter and have obtained necessary documentation.     Patient called in to cancel all future appts with [facility], Patient states she is stopping program for now and has sent mora a message as well.

## 2025-08-01 ENCOUNTER — RESULTS FOLLOW-UP (OUTPATIENT)
Facility: CLINIC | Age: 70
End: 2025-08-01

## 2025-08-01 LAB
ALBUMIN SERPL-MCNC: 3.8 G/DL (ref 3.5–5.2)
ALBUMIN/GLOB SERPL: 1.2 (ref 1.1–2.2)
ALP SERPL-CCNC: 92 U/L (ref 35–104)
ALT SERPL-CCNC: 29 U/L (ref 10–35)
ANION GAP SERPL CALC-SCNC: 10 MMOL/L (ref 2–14)
AST SERPL-CCNC: 25 U/L (ref 10–35)
BILIRUB SERPL-MCNC: 1 MG/DL (ref 0–1.2)
BUN SERPL-MCNC: 15 MG/DL (ref 8–23)
BUN/CREAT SERPL: 15 (ref 12–20)
CALCIUM SERPL-MCNC: 10.3 MG/DL (ref 8.8–10.2)
CHLORIDE SERPL-SCNC: 105 MMOL/L (ref 98–107)
CHOLEST SERPL-MCNC: 175 MG/DL (ref 0–200)
CO2 SERPL-SCNC: 26 MMOL/L (ref 20–29)
CREAT SERPL-MCNC: 1 MG/DL (ref 0.6–1)
GLOBULIN SER CALC-MCNC: 3.2 G/DL (ref 2–4)
GLUCOSE SERPL-MCNC: 100 MG/DL (ref 65–100)
HDLC SERPL-MCNC: 64 MG/DL (ref 40–60)
HDLC SERPL: 2.7
LDLC SERPL CALC-MCNC: 84 MG/DL
POTASSIUM SERPL-SCNC: 5.1 MMOL/L (ref 3.5–5.1)
PROT SERPL-MCNC: 6.9 G/DL (ref 6.4–8.3)
SODIUM SERPL-SCNC: 141 MMOL/L (ref 136–145)
TRIGL SERPL-MCNC: 136 MG/DL (ref 0–150)
VLDLC SERPL CALC-MCNC: 27 MG/DL

## 2025-08-21 ENCOUNTER — HOSPITAL ENCOUNTER (OUTPATIENT)
Facility: HOSPITAL | Age: 70
Discharge: HOME | End: 2025-08-24
Attending: INTERNAL MEDICINE
Payer: MEDICARE

## 2025-08-21 VITALS — WEIGHT: 162 LBS | BODY MASS INDEX: 28.7 KG/M2

## 2025-08-21 DIAGNOSIS — Z12.31 BREAST CANCER SCREENING BY MAMMOGRAM: ICD-10-CM

## 2025-08-21 PROCEDURE — 77063 BREAST TOMOSYNTHESIS BI: CPT

## 2025-08-22 DIAGNOSIS — R92.8 ABNORMAL MAMMOGRAM OF RIGHT BREAST: Primary | ICD-10-CM

## 2025-08-28 ENCOUNTER — HOSPITAL ENCOUNTER (OUTPATIENT)
Facility: HOSPITAL | Age: 70
Discharge: HOME | End: 2025-08-31
Attending: INTERNAL MEDICINE
Payer: MEDICARE

## 2025-08-28 VITALS — BODY MASS INDEX: 28.7 KG/M2 | WEIGHT: 162 LBS | HEIGHT: 63 IN

## 2025-08-28 DIAGNOSIS — R92.8 ABNORMAL MAMMOGRAM OF RIGHT BREAST: ICD-10-CM

## 2025-08-28 PROCEDURE — 76642 ULTRASOUND BREAST LIMITED: CPT

## 2025-08-28 PROCEDURE — G0279 TOMOSYNTHESIS, MAMMO: HCPCS
